# Patient Record
Sex: MALE | Race: WHITE | NOT HISPANIC OR LATINO
[De-identification: names, ages, dates, MRNs, and addresses within clinical notes are randomized per-mention and may not be internally consistent; named-entity substitution may affect disease eponyms.]

---

## 2017-03-29 ENCOUNTER — APPOINTMENT (OUTPATIENT)
Dept: NEUROLOGY | Facility: CLINIC | Age: 67
End: 2017-03-29

## 2017-03-29 VITALS
BODY MASS INDEX: 27.98 KG/M2 | SYSTOLIC BLOOD PRESSURE: 118 MMHG | WEIGHT: 225 LBS | DIASTOLIC BLOOD PRESSURE: 62 MMHG | HEART RATE: 53 BPM | HEIGHT: 75 IN

## 2017-03-30 ENCOUNTER — APPOINTMENT (OUTPATIENT)
Dept: NEUROLOGY | Facility: CLINIC | Age: 67
End: 2017-03-30

## 2017-04-07 ENCOUNTER — OUTPATIENT (OUTPATIENT)
Dept: OUTPATIENT SERVICES | Facility: HOSPITAL | Age: 67
LOS: 1 days | End: 2017-04-07
Payer: MEDICARE

## 2017-04-07 ENCOUNTER — APPOINTMENT (OUTPATIENT)
Dept: CV DIAGNOSITCS | Facility: HOSPITAL | Age: 67
End: 2017-04-07

## 2017-04-07 DIAGNOSIS — G45.1 CAROTID ARTERY SYNDROME (HEMISPHERIC): ICD-10-CM

## 2017-04-07 PROCEDURE — 93306 TTE W/DOPPLER COMPLETE: CPT

## 2017-04-07 PROCEDURE — 93306 TTE W/DOPPLER COMPLETE: CPT | Mod: 26

## 2017-04-07 PROCEDURE — 93312 ECHO TRANSESOPHAGEAL: CPT | Mod: 26

## 2017-04-07 PROCEDURE — 93312 ECHO TRANSESOPHAGEAL: CPT

## 2017-04-14 ENCOUNTER — FORM ENCOUNTER (OUTPATIENT)
Age: 67
End: 2017-04-14

## 2017-04-15 ENCOUNTER — OUTPATIENT (OUTPATIENT)
Dept: OUTPATIENT SERVICES | Facility: HOSPITAL | Age: 67
LOS: 1 days | End: 2017-04-15
Payer: MEDICARE

## 2017-04-15 ENCOUNTER — APPOINTMENT (OUTPATIENT)
Dept: MRI IMAGING | Facility: CLINIC | Age: 67
End: 2017-04-15

## 2017-04-15 DIAGNOSIS — Z00.8 ENCOUNTER FOR OTHER GENERAL EXAMINATION: ICD-10-CM

## 2017-04-15 PROCEDURE — 70549 MR ANGIOGRAPH NECK W/O&W/DYE: CPT

## 2017-04-15 PROCEDURE — A9585: CPT

## 2017-04-15 PROCEDURE — 70544 MR ANGIOGRAPHY HEAD W/O DYE: CPT

## 2017-04-15 PROCEDURE — 70553 MRI BRAIN STEM W/O & W/DYE: CPT

## 2017-04-15 PROCEDURE — 82565 ASSAY OF CREATININE: CPT

## 2017-04-18 ENCOUNTER — CHART COPY (OUTPATIENT)
Age: 67
End: 2017-04-18

## 2017-04-24 ENCOUNTER — APPOINTMENT (OUTPATIENT)
Dept: NEUROLOGY | Facility: CLINIC | Age: 67
End: 2017-04-24

## 2017-04-25 ENCOUNTER — APPOINTMENT (OUTPATIENT)
Dept: NEUROLOGY | Facility: CLINIC | Age: 67
End: 2017-04-25

## 2017-05-02 ENCOUNTER — APPOINTMENT (OUTPATIENT)
Dept: NEUROLOGY | Facility: CLINIC | Age: 67
End: 2017-05-02

## 2017-05-02 VITALS
HEIGHT: 75 IN | BODY MASS INDEX: 28.35 KG/M2 | HEART RATE: 53 BPM | DIASTOLIC BLOOD PRESSURE: 74 MMHG | SYSTOLIC BLOOD PRESSURE: 130 MMHG | WEIGHT: 228 LBS

## 2017-05-02 RX ORDER — CLOPIDOGREL 75 MG/1
TABLET, FILM COATED ORAL
Refills: 0 | Status: DISCONTINUED | COMMUNITY
End: 2017-05-02

## 2017-05-04 ENCOUNTER — FORM ENCOUNTER (OUTPATIENT)
Age: 67
End: 2017-05-04

## 2017-05-05 ENCOUNTER — APPOINTMENT (OUTPATIENT)
Dept: NEUROLOGY | Facility: CLINIC | Age: 67
End: 2017-05-05

## 2017-05-05 ENCOUNTER — APPOINTMENT (OUTPATIENT)
Dept: ULTRASOUND IMAGING | Facility: CLINIC | Age: 67
End: 2017-05-05

## 2017-05-05 ENCOUNTER — OUTPATIENT (OUTPATIENT)
Dept: OUTPATIENT SERVICES | Facility: HOSPITAL | Age: 67
LOS: 1 days | End: 2017-05-05
Payer: MEDICARE

## 2017-05-05 DIAGNOSIS — G45.9 TRANSIENT CEREBRAL ISCHEMIC ATTACK, UNSPECIFIED: ICD-10-CM

## 2017-05-05 PROCEDURE — 93970 EXTREMITY STUDY: CPT

## 2017-05-15 ENCOUNTER — OUTPATIENT (OUTPATIENT)
Dept: OUTPATIENT SERVICES | Facility: HOSPITAL | Age: 67
LOS: 1 days | End: 2017-05-15
Payer: MEDICARE

## 2017-05-15 VITALS
HEART RATE: 52 BPM | OXYGEN SATURATION: 98 % | HEIGHT: 75 IN | TEMPERATURE: 98 F | WEIGHT: 227.96 LBS | DIASTOLIC BLOOD PRESSURE: 74 MMHG | SYSTOLIC BLOOD PRESSURE: 125 MMHG | RESPIRATION RATE: 16 BRPM

## 2017-05-15 DIAGNOSIS — I66.9 OCCLUSION AND STENOSIS OF UNSPECIFIED CEREBRAL ARTERY: ICD-10-CM

## 2017-05-15 PROCEDURE — C1764: CPT

## 2017-05-15 PROCEDURE — 93005 ELECTROCARDIOGRAM TRACING: CPT

## 2017-05-15 PROCEDURE — 93010 ELECTROCARDIOGRAM REPORT: CPT

## 2017-05-15 PROCEDURE — 99152 MOD SED SAME PHYS/QHP 5/>YRS: CPT

## 2017-05-15 PROCEDURE — 33282: CPT

## 2017-05-15 NOTE — H&P CARDIOLOGY - PMH
Depressed    Gastroesophageal reflux disease    High cholesterol    Hypothyroid    TIA (transient ischemic attack)

## 2017-05-15 NOTE — H&P CARDIOLOGY - HISTORY OF PRESENT ILLNESS
65 yo PMH CAD, HLD, depression, hypothyroidism, GERD, hx of alcohol and drug abuse (cocaine last used in 1988, alcohol 1989), hx TIA presents today for loop recorder implant. Patient reports reoccurring episodes of dysarthria within the last 2 years. Last episode was on March 29, 2017, sudden acute onset of dysarthria lasting several minutes- not associated with HA, visual changes, change in LOC, migraines. Patient currently denies chest pain, palpitations edema, syncope, recent fever/chills    neurology: Dr. Zain Moreau   cards: Arya Ordoñez

## 2017-05-19 ENCOUNTER — APPOINTMENT (OUTPATIENT)
Dept: NEUROLOGY | Facility: CLINIC | Age: 67
End: 2017-05-19

## 2017-05-21 ENCOUNTER — FORM ENCOUNTER (OUTPATIENT)
Age: 67
End: 2017-05-21

## 2017-05-22 ENCOUNTER — OUTPATIENT (OUTPATIENT)
Dept: OUTPATIENT SERVICES | Facility: HOSPITAL | Age: 67
LOS: 1 days | End: 2017-05-22
Payer: MEDICARE

## 2017-05-22 ENCOUNTER — APPOINTMENT (OUTPATIENT)
Dept: MRI IMAGING | Facility: CLINIC | Age: 67
End: 2017-05-22

## 2017-05-22 DIAGNOSIS — G45.9 TRANSIENT CEREBRAL ISCHEMIC ATTACK, UNSPECIFIED: ICD-10-CM

## 2017-05-22 PROCEDURE — C8920: CPT

## 2017-05-22 PROCEDURE — A9585: CPT

## 2017-05-25 ENCOUNTER — APPOINTMENT (OUTPATIENT)
Dept: ELECTROPHYSIOLOGY | Facility: CLINIC | Age: 67
End: 2017-05-25

## 2017-05-26 ENCOUNTER — NON-APPOINTMENT (OUTPATIENT)
Age: 67
End: 2017-05-26

## 2017-05-26 ENCOUNTER — APPOINTMENT (OUTPATIENT)
Dept: ELECTROPHYSIOLOGY | Facility: CLINIC | Age: 67
End: 2017-05-26

## 2017-05-26 VITALS
DIASTOLIC BLOOD PRESSURE: 74 MMHG | HEART RATE: 60 BPM | SYSTOLIC BLOOD PRESSURE: 135 MMHG | WEIGHT: 234 LBS | BODY MASS INDEX: 29.25 KG/M2

## 2017-06-13 ENCOUNTER — EMERGENCY (EMERGENCY)
Facility: HOSPITAL | Age: 67
LOS: 0 days | Discharge: HOME | End: 2017-06-13
Attending: EMERGENCY MEDICINE
Payer: COMMERCIAL

## 2017-06-13 VITALS
OXYGEN SATURATION: 100 % | RESPIRATION RATE: 18 BRPM | WEIGHT: 138.89 LBS | TEMPERATURE: 98 F | DIASTOLIC BLOOD PRESSURE: 75 MMHG | HEART RATE: 113 BPM | SYSTOLIC BLOOD PRESSURE: 118 MMHG

## 2017-06-13 PROCEDURE — 99283 EMERGENCY DEPT VISIT LOW MDM: CPT | Mod: 25

## 2017-06-13 PROCEDURE — 12002 RPR S/N/AX/GEN/TRNK2.6-7.5CM: CPT
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
## 2017-06-14 DIAGNOSIS — S91.312A LACERATION WITHOUT FOREIGN BODY, LEFT FOOT, INITIAL ENCOUNTER: ICD-10-CM
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
## 2017-06-19 ENCOUNTER — APPOINTMENT (OUTPATIENT)
Dept: CARDIOLOGY | Facility: CLINIC | Age: 67
End: 2017-06-19

## 2017-06-21 ENCOUNTER — APPOINTMENT (OUTPATIENT)
Dept: NEUROLOGY | Facility: CLINIC | Age: 67
End: 2017-06-21

## 2017-06-21 VITALS
DIASTOLIC BLOOD PRESSURE: 73 MMHG | SYSTOLIC BLOOD PRESSURE: 127 MMHG | HEART RATE: 61 BPM | WEIGHT: 229 LBS | BODY MASS INDEX: 28.47 KG/M2 | HEIGHT: 75 IN

## 2017-06-26 ENCOUNTER — APPOINTMENT (OUTPATIENT)
Dept: ELECTROPHYSIOLOGY | Facility: CLINIC | Age: 67
End: 2017-06-26
Payer: MEDICARE

## 2017-06-26 PROCEDURE — 93298 REM INTERROG DEV EVAL SCRMS: CPT

## 2017-06-27 ENCOUNTER — APPOINTMENT (OUTPATIENT)
Dept: NEUROLOGY | Facility: CLINIC | Age: 67
End: 2017-06-27

## 2017-07-14 ENCOUNTER — APPOINTMENT (OUTPATIENT)
Dept: CARDIOLOGY | Facility: CLINIC | Age: 67
End: 2017-07-14

## 2017-07-14 ENCOUNTER — NON-APPOINTMENT (OUTPATIENT)
Age: 67
End: 2017-07-14

## 2017-07-14 VITALS
WEIGHT: 232 LBS | DIASTOLIC BLOOD PRESSURE: 79 MMHG | HEIGHT: 75 IN | SYSTOLIC BLOOD PRESSURE: 126 MMHG | BODY MASS INDEX: 28.85 KG/M2 | OXYGEN SATURATION: 98 % | HEART RATE: 56 BPM

## 2017-07-20 ENCOUNTER — APPOINTMENT (OUTPATIENT)
Dept: CV DIAGNOSTICS | Facility: HOSPITAL | Age: 67
End: 2017-07-20

## 2017-07-20 ENCOUNTER — OUTPATIENT (OUTPATIENT)
Dept: OUTPATIENT SERVICES | Facility: HOSPITAL | Age: 67
LOS: 1 days | End: 2017-07-20
Payer: MEDICARE

## 2017-07-20 DIAGNOSIS — I25.10 ATHEROSCLEROTIC HEART DISEASE OF NATIVE CORONARY ARTERY WITHOUT ANGINA PECTORIS: ICD-10-CM

## 2017-07-20 PROCEDURE — 78452 HT MUSCLE IMAGE SPECT MULT: CPT | Mod: 26

## 2017-07-20 PROCEDURE — 93017 CV STRESS TEST TRACING ONLY: CPT

## 2017-07-20 PROCEDURE — 93016 CV STRESS TEST SUPVJ ONLY: CPT

## 2017-07-20 PROCEDURE — 93018 CV STRESS TEST I&R ONLY: CPT

## 2017-07-20 PROCEDURE — 78452 HT MUSCLE IMAGE SPECT MULT: CPT

## 2017-07-20 PROCEDURE — A9500: CPT

## 2017-08-08 ENCOUNTER — APPOINTMENT (OUTPATIENT)
Dept: ELECTROPHYSIOLOGY | Facility: CLINIC | Age: 67
End: 2017-08-08
Payer: MEDICARE

## 2017-08-08 PROCEDURE — 93298 REM INTERROG DEV EVAL SCRMS: CPT

## 2017-08-16 ENCOUNTER — APPOINTMENT (OUTPATIENT)
Dept: PULMONOLOGY | Facility: CLINIC | Age: 67
End: 2017-08-16
Payer: MEDICARE

## 2017-08-16 VITALS
TEMPERATURE: 97.8 F | SYSTOLIC BLOOD PRESSURE: 120 MMHG | HEIGHT: 75 IN | DIASTOLIC BLOOD PRESSURE: 80 MMHG | HEART RATE: 52 BPM | WEIGHT: 230 LBS | RESPIRATION RATE: 14 BRPM | BODY MASS INDEX: 28.6 KG/M2

## 2017-08-16 DIAGNOSIS — R06.83 SNORING: ICD-10-CM

## 2017-08-16 PROCEDURE — 99204 OFFICE O/P NEW MOD 45 MIN: CPT

## 2017-09-12 ENCOUNTER — APPOINTMENT (OUTPATIENT)
Dept: NEUROLOGY | Facility: CLINIC | Age: 67
End: 2017-09-12

## 2017-09-13 ENCOUNTER — NON-APPOINTMENT (OUTPATIENT)
Age: 67
End: 2017-09-13

## 2017-09-13 ENCOUNTER — APPOINTMENT (OUTPATIENT)
Dept: ELECTROPHYSIOLOGY | Facility: CLINIC | Age: 67
End: 2017-09-13
Payer: MEDICARE

## 2017-09-13 VITALS — DIASTOLIC BLOOD PRESSURE: 70 MMHG | HEART RATE: 54 BPM | OXYGEN SATURATION: 99 % | SYSTOLIC BLOOD PRESSURE: 137 MMHG

## 2017-09-13 PROCEDURE — 93285 PRGRMG DEV EVAL SCRMS IP: CPT

## 2017-09-27 ENCOUNTER — APPOINTMENT (OUTPATIENT)
Dept: SLEEP CENTER | Facility: CLINIC | Age: 67
End: 2017-09-27
Payer: MEDICARE

## 2017-09-27 ENCOUNTER — OUTPATIENT (OUTPATIENT)
Dept: OUTPATIENT SERVICES | Facility: HOSPITAL | Age: 67
LOS: 1 days | End: 2017-09-27
Payer: MEDICARE

## 2017-09-27 PROCEDURE — G0399: CPT | Mod: 26

## 2017-09-27 PROCEDURE — G0399: CPT

## 2017-10-03 ENCOUNTER — APPOINTMENT (OUTPATIENT)
Dept: ELECTROPHYSIOLOGY | Facility: CLINIC | Age: 67
End: 2017-10-03

## 2017-10-06 DIAGNOSIS — G47.33 OBSTRUCTIVE SLEEP APNEA (ADULT) (PEDIATRIC): ICD-10-CM

## 2017-10-16 ENCOUNTER — APPOINTMENT (OUTPATIENT)
Dept: NEUROLOGY | Facility: CLINIC | Age: 67
End: 2017-10-16
Payer: MEDICARE

## 2017-10-16 VITALS
BODY MASS INDEX: 29.09 KG/M2 | WEIGHT: 234 LBS | HEART RATE: 50 BPM | SYSTOLIC BLOOD PRESSURE: 119 MMHG | DIASTOLIC BLOOD PRESSURE: 63 MMHG | HEIGHT: 75 IN

## 2017-10-16 PROCEDURE — 99215 OFFICE O/P EST HI 40 MIN: CPT

## 2017-10-23 ENCOUNTER — MEDICATION RENEWAL (OUTPATIENT)
Age: 67
End: 2017-10-23

## 2017-10-31 ENCOUNTER — APPOINTMENT (OUTPATIENT)
Dept: ELECTROPHYSIOLOGY | Facility: CLINIC | Age: 67
End: 2017-10-31
Payer: MEDICARE

## 2017-10-31 PROCEDURE — 93298 REM INTERROG DEV EVAL SCRMS: CPT

## 2017-11-13 ENCOUNTER — APPOINTMENT (OUTPATIENT)
Dept: ELECTROPHYSIOLOGY | Facility: CLINIC | Age: 67
End: 2017-11-13

## 2017-11-27 ENCOUNTER — OUTPATIENT (OUTPATIENT)
Dept: OUTPATIENT SERVICES | Facility: HOSPITAL | Age: 67
LOS: 1 days | End: 2017-11-27
Payer: MEDICARE

## 2017-11-27 ENCOUNTER — APPOINTMENT (OUTPATIENT)
Dept: SLEEP CENTER | Facility: CLINIC | Age: 67
End: 2017-11-27
Payer: MEDICARE

## 2017-11-27 PROCEDURE — G0400: CPT

## 2017-11-27 PROCEDURE — G0400: CPT | Mod: 26

## 2017-12-08 ENCOUNTER — RESULT REVIEW (OUTPATIENT)
Age: 67
End: 2017-12-08

## 2017-12-08 DIAGNOSIS — G47.33 OBSTRUCTIVE SLEEP APNEA (ADULT) (PEDIATRIC): ICD-10-CM

## 2017-12-18 ENCOUNTER — APPOINTMENT (OUTPATIENT)
Dept: ELECTROPHYSIOLOGY | Facility: CLINIC | Age: 67
End: 2017-12-18
Payer: MEDICARE

## 2017-12-18 PROCEDURE — 93298 REM INTERROG DEV EVAL SCRMS: CPT

## 2017-12-18 PROCEDURE — 93299: CPT

## 2018-01-08 ENCOUNTER — APPOINTMENT (OUTPATIENT)
Dept: CARDIOLOGY | Facility: CLINIC | Age: 68
End: 2018-01-08

## 2018-01-30 ENCOUNTER — APPOINTMENT (OUTPATIENT)
Dept: ELECTROPHYSIOLOGY | Facility: CLINIC | Age: 68
End: 2018-01-30
Payer: MEDICARE

## 2018-01-30 PROCEDURE — 93299: CPT

## 2018-01-30 PROCEDURE — 93298 REM INTERROG DEV EVAL SCRMS: CPT

## 2018-02-27 ENCOUNTER — EMERGENCY (EMERGENCY)
Facility: HOSPITAL | Age: 68
LOS: 1 days | Discharge: HOME | End: 2018-02-27
Attending: EMERGENCY MEDICINE | Admitting: EMERGENCY MEDICINE
Payer: MEDICARE

## 2018-02-27 ENCOUNTER — APPOINTMENT (OUTPATIENT)
Dept: NEUROLOGY | Facility: CLINIC | Age: 68
End: 2018-02-27
Payer: MEDICARE

## 2018-02-27 VITALS
HEART RATE: 52 BPM | RESPIRATION RATE: 16 BRPM | OXYGEN SATURATION: 98 % | DIASTOLIC BLOOD PRESSURE: 75 MMHG | SYSTOLIC BLOOD PRESSURE: 143 MMHG

## 2018-02-27 VITALS
BODY MASS INDEX: 27.98 KG/M2 | HEART RATE: 54 BPM | SYSTOLIC BLOOD PRESSURE: 118 MMHG | DIASTOLIC BLOOD PRESSURE: 59 MMHG | WEIGHT: 225 LBS | HEIGHT: 75 IN

## 2018-02-27 LAB
ALBUMIN SERPL ELPH-MCNC: 4 G/DL — SIGNIFICANT CHANGE UP (ref 3.3–5)
ALP SERPL-CCNC: 61 U/L — SIGNIFICANT CHANGE UP (ref 40–120)
ALT FLD-CCNC: 10 U/L RC — SIGNIFICANT CHANGE UP (ref 10–45)
ANION GAP SERPL CALC-SCNC: 11 MMOL/L — SIGNIFICANT CHANGE UP (ref 5–17)
APTT BLD: 29.6 SEC — SIGNIFICANT CHANGE UP (ref 27.5–37.4)
AST SERPL-CCNC: 17 U/L — SIGNIFICANT CHANGE UP (ref 10–40)
BASOPHILS # BLD AUTO: 0.1 K/UL — SIGNIFICANT CHANGE UP (ref 0–0.2)
BASOPHILS NFR BLD AUTO: 1.5 % — SIGNIFICANT CHANGE UP (ref 0–2)
BILIRUB SERPL-MCNC: 0.3 MG/DL — SIGNIFICANT CHANGE UP (ref 0.2–1.2)
BUN SERPL-MCNC: 11 MG/DL — SIGNIFICANT CHANGE UP (ref 7–23)
CALCIUM SERPL-MCNC: 9.4 MG/DL — SIGNIFICANT CHANGE UP (ref 8.4–10.5)
CHLORIDE SERPL-SCNC: 106 MMOL/L — SIGNIFICANT CHANGE UP (ref 96–108)
CO2 SERPL-SCNC: 27 MMOL/L — SIGNIFICANT CHANGE UP (ref 22–31)
CREAT SERPL-MCNC: 0.87 MG/DL — SIGNIFICANT CHANGE UP (ref 0.5–1.3)
EOSINOPHIL # BLD AUTO: 0.1 K/UL — SIGNIFICANT CHANGE UP (ref 0–0.5)
EOSINOPHIL NFR BLD AUTO: 2.4 % — SIGNIFICANT CHANGE UP (ref 0–6)
GAS PNL BLDV: SIGNIFICANT CHANGE UP
GLUCOSE SERPL-MCNC: 72 MG/DL — SIGNIFICANT CHANGE UP (ref 70–99)
HCT VFR BLD CALC: 36.8 % — LOW (ref 39–50)
HGB BLD-MCNC: 12.8 G/DL — LOW (ref 13–17)
INR BLD: 1.07 RATIO — SIGNIFICANT CHANGE UP (ref 0.88–1.16)
LYMPHOCYTES # BLD AUTO: 1.4 K/UL — SIGNIFICANT CHANGE UP (ref 1–3.3)
LYMPHOCYTES # BLD AUTO: 29.9 % — SIGNIFICANT CHANGE UP (ref 13–44)
MCHC RBC-ENTMCNC: 32.4 PG — SIGNIFICANT CHANGE UP (ref 27–34)
MCHC RBC-ENTMCNC: 34.7 GM/DL — SIGNIFICANT CHANGE UP (ref 32–36)
MCV RBC AUTO: 93.4 FL — SIGNIFICANT CHANGE UP (ref 80–100)
MONOCYTES # BLD AUTO: 0.5 K/UL — SIGNIFICANT CHANGE UP (ref 0–0.9)
MONOCYTES NFR BLD AUTO: 9.7 % — SIGNIFICANT CHANGE UP (ref 2–14)
NEUTROPHILS # BLD AUTO: 2.7 K/UL — SIGNIFICANT CHANGE UP (ref 1.8–7.4)
NEUTROPHILS NFR BLD AUTO: 56.5 % — SIGNIFICANT CHANGE UP (ref 43–77)
PLATELET # BLD AUTO: 160 K/UL — SIGNIFICANT CHANGE UP (ref 150–400)
POTASSIUM SERPL-MCNC: 4.8 MMOL/L — SIGNIFICANT CHANGE UP (ref 3.5–5.3)
POTASSIUM SERPL-SCNC: 4.8 MMOL/L — SIGNIFICANT CHANGE UP (ref 3.5–5.3)
PROT SERPL-MCNC: 7.6 G/DL — SIGNIFICANT CHANGE UP (ref 6–8.3)
PROTHROM AB SERPL-ACNC: 11.6 SEC — SIGNIFICANT CHANGE UP (ref 9.8–12.7)
RBC # BLD: 3.94 M/UL — LOW (ref 4.2–5.8)
RBC # FLD: 12.2 % — SIGNIFICANT CHANGE UP (ref 10.3–14.5)
SODIUM SERPL-SCNC: 144 MMOL/L — SIGNIFICANT CHANGE UP (ref 135–145)
WBC # BLD: 4.8 K/UL — SIGNIFICANT CHANGE UP (ref 3.8–10.5)
WBC # FLD AUTO: 4.8 K/UL — SIGNIFICANT CHANGE UP (ref 3.8–10.5)

## 2018-02-27 PROCEDURE — 93010 ELECTROCARDIOGRAM REPORT: CPT

## 2018-02-27 PROCEDURE — 99215 OFFICE O/P EST HI 40 MIN: CPT

## 2018-02-27 PROCEDURE — 99220: CPT | Mod: 25

## 2018-02-27 PROCEDURE — 70498 CT ANGIOGRAPHY NECK: CPT | Mod: 26

## 2018-02-27 PROCEDURE — 70496 CT ANGIOGRAPHY HEAD: CPT | Mod: 26

## 2018-02-27 RX ORDER — LEVOTHYROXINE SODIUM 125 MCG
175 TABLET ORAL DAILY
Qty: 0 | Refills: 0 | Status: DISCONTINUED | OUTPATIENT
Start: 2018-02-27 | End: 2018-03-03

## 2018-02-27 RX ORDER — ATORVASTATIN CALCIUM 80 MG/1
10 TABLET, FILM COATED ORAL AT BEDTIME
Qty: 0 | Refills: 0 | Status: DISCONTINUED | OUTPATIENT
Start: 2018-02-27 | End: 2018-03-03

## 2018-02-27 RX ORDER — PANTOPRAZOLE SODIUM 20 MG/1
40 TABLET, DELAYED RELEASE ORAL
Qty: 0 | Refills: 0 | Status: DISCONTINUED | OUTPATIENT
Start: 2018-02-27 | End: 2018-03-03

## 2018-02-27 RX ORDER — SODIUM CHLORIDE 9 MG/ML
1000 INJECTION INTRAMUSCULAR; INTRAVENOUS; SUBCUTANEOUS ONCE
Qty: 0 | Refills: 0 | Status: COMPLETED | OUTPATIENT
Start: 2018-02-27 | End: 2018-02-27

## 2018-02-27 RX ORDER — LEVOTHYROXINE SODIUM 125 MCG
1 TABLET ORAL
Qty: 0 | Refills: 0 | COMMUNITY

## 2018-02-27 RX ORDER — ASPIRIN AND DIPYRIDAMOLE 25; 200 MG/1; MG/1
1 CAPSULE, EXTENDED RELEASE ORAL
Qty: 0 | Refills: 0 | Status: DISCONTINUED | OUTPATIENT
Start: 2018-02-27 | End: 2018-03-03

## 2018-02-27 RX ORDER — SODIUM CHLORIDE 9 MG/ML
3 INJECTION INTRAMUSCULAR; INTRAVENOUS; SUBCUTANEOUS EVERY 12 HOURS
Qty: 0 | Refills: 0 | Status: DISCONTINUED | OUTPATIENT
Start: 2018-02-27 | End: 2018-03-03

## 2018-02-27 RX ADMIN — ATORVASTATIN CALCIUM 10 MILLIGRAM(S): 80 TABLET, FILM COATED ORAL at 23:23

## 2018-02-27 RX ADMIN — SODIUM CHLORIDE 1000 MILLILITER(S): 9 INJECTION INTRAMUSCULAR; INTRAVENOUS; SUBCUTANEOUS at 19:26

## 2018-02-27 RX ADMIN — ASPIRIN AND DIPYRIDAMOLE 1 CAPSULE(S): 25; 200 CAPSULE, EXTENDED RELEASE ORAL at 23:23

## 2018-02-27 NOTE — ED CDU PROVIDER INITIAL DAY NOTE - CHPI ED SYMPTOMS NEG
no vomiting/no numbness/no dizziness/no blurred vision/no confusion/no loss of consciousness/no nausea/no fever/no weakness/no change in level of consciousness

## 2018-02-27 NOTE — CONSULT NOTE ADULT - SUBJECTIVE AND OBJECTIVE BOX
Neurology consult    SMITH [id number]     Patient is a 67y old  Male who presents with a chief complaint of difficulty with word finding    HPI: Patient is a 68 y/o man with a pmh of hypothyroid, TIA and CAD who presents with a 1.5 minute period of difficulty finding words. Today, patient had an appointment with his outpatient neurologist Dr. Moreau. During the naming portion of the neuro exam he was able to recognize and say watch, but when presented with a pen he knew what it was, but was unable to find the word pen. He had no difficulty reading during this episode and he was able to repeat a short phrase without difficulty. Dr. Moreau left to call EMS and by the time that he returned he was able to say pen and had a completely normal neuro exam per patient.   The previous Thursday he had an EGD. To prepare for the procedure he had stopped taking his aspirin/dipyridemole 1 week prior. He restarted on Sunday 2/25. This is the 5th occurrence of an episode like this. The first was >5 years ago. He drove himself to the ED and was told that CT was negative.     REVIEW OF SYSTEMS:    Constitutional: No fever, chills, fatigue, weakness  Eyes: no eye pain, visual disturbances, or discharge  ENT:  No difficulty hearing, tinnitus, vertigo; No sinus or throat pain  Neck: No pain or stiffness  Respiratory: No cough, dyspnea, wheezing   Cardiovascular: No chest pain, palpitations,   Gastrointestinal: No abdominal or epigastric pain. No nausea, vomiting  No diarrhea or constipation.   Genitourinary: No dysuria, frequency, hematuria or incontinence  Neurological: No headaches, lightheadedness, vertigo, numbness or tremors  Psychiatric: No depression, anxiety, mood swings or difficulty sleeping  Musculoskeletal: No joint pain or swelling; No muscle, back or extremity pain  Skin: No itching, burning, rashes or lesions   Lymph Nodes: No enlarged glands  Endocrine: No heat or cold intolerance; No hair loss, No h/o diabetes or thyroid dysfunction  Allergy and Immunologic: No hives or eczema    MEDICATIONS        PMH: TIA (transient ischemic attack)  Gastroesophageal reflux disease  Depressed  Hypothyroid  High cholesterol       PSH: No significant past surgical history      Family history: No history of dementia, strokes, or seizures   FAMILY HISTORY:  Family history of myocardial infarction (Father, Sibling)      SOCIAL HISTORY:  No history of tobacco or alcohol use     Allergies    No Known Allergies    Intolerances            Vital Signs Last 24 Hrs  T(C): 36.7 (27 Feb 2018 15:46), Max: 36.7 (27 Feb 2018 15:46)  T(F): 98 (27 Feb 2018 15:46), Max: 98 (27 Feb 2018 15:46)  HR: 56 (27 Feb 2018 15:46) (52 - 56)  BP: 158/85 (27 Feb 2018 15:46) (143/75 - 158/85)  BP(mean): --  RR: 18 (27 Feb 2018 15:46) (16 - 18)  SpO2: 100% (27 Feb 2018 15:46) (98% - 100%)      On Neurological Examination:    Mental Status - Patient is alert, awake, oriented X3. fluent, names, no dysarthria no aphasia Follows commands well and able to answer questions appropriately. Mood and affect  normal    Cranial Nerves - PERRL, EOMI, VFF, V1-V3 intact, no gross facial asymmetry, tongue/uvula midline    Motor Exam -   Right upper  Left upper  Right lower  Left lower      nml bulk/tone    Sensory    Intact to light touch and pinprick bilaterally    Coord: FTN intact bilaterally     Gait -  normal      Reflexes:          2+ throughout                                               GENERAL Exam:     Nontoxic , No Acute Distress   	  HEENT:  normocephalic, atraumatic  		  LUNGS:	Clear bilaterally  No Wheeze    	  HEART:	 Normal S1S2   No murmur RRR        	  GI/ ABDOMEN:  Soft  Non tender    EXTREMITIES:   No Edema  No Clubbing  No Cyanosis No Edema    MUSCULOSKELETAL: Normal Range of Motion  	   SKIN:      Normal   No Ecchymosis               LABS:  CBC Full  -  ( 27 Feb 2018 15:59 )  WBC Count : 4.8 K/uL  Hemoglobin : 12.8 g/dL  Hematocrit : 36.8 %  Platelet Count - Automated : 160 K/uL  Mean Cell Volume : 93.4 fl  Mean Cell Hemoglobin : 32.4 pg  Mean Cell Hemoglobin Concentration : 34.7 gm/dL  Auto Neutrophil # : 2.7 K/uL  Auto Lymphocyte # : 1.4 K/uL  Auto Monocyte # : 0.5 K/uL  Auto Eosinophil # : 0.1 K/uL  Auto Basophil # : 0.1 K/uL  Auto Neutrophil % : 56.5 %  Auto Lymphocyte % : 29.9 %  Auto Monocyte % : 9.7 %  Auto Eosinophil % : 2.4 %  Auto Basophil % : 1.5 %      02-27    144  |  106  |  11  ----------------------------<  72  4.8   |  27  |  0.87    Ca    9.4      27 Feb 2018 15:59    TPro  7.6  /  Alb  4.0  /  TBili  0.3  /  DBili  x   /  AST  17  /  ALT  10  /  AlkPhos  61  02-27    LIVER FUNCTIONS - ( 27 Feb 2018 15:59 )  Alb: 4.0 g/dL / Pro: 7.6 g/dL / ALK PHOS: 61 U/L / ALT: 10 U/L RC / AST: 17 U/L / GGT: x           Hemoglobin A1C:       PT/INR - ( 27 Feb 2018 15:59 )   PT: 11.6 sec;   INR: 1.07 ratio         PTT - ( 27 Feb 2018 15:59 )  PTT:29.6 sec      RADIOLOGY  CTH   MRI: Neurology consult    SMITH [id number]     Patient is a 67y old  Male who presents with a chief complaint of difficulty with word finding    HPI: Patient is a 66 y/o man with a pmh of hypothyroid, TIA and CAD who presents with a 1.5 minute period of difficulty finding words. Today, patient had an appointment with his outpatient neurologist Dr. Moreau. During the naming portion of the neuro exam he was able to recognize and say watch, but when presented with a pen he knew what it was, but was unable to find the word pen. He had no difficulty reading during this episode and he was able to repeat a short phrase without difficulty. Dr. Moreau left to call EMS and by the time that he returned he was able to say pen and had a completely normal neuro exam per patient.   The previous Thursday he had an EGD. To prepare for the procedure he had stopped taking his aspirin/dipyridemole 1 week prior. He restarted on Sunday 2/25. This is the 5th occurrence of an episode like this. The first was >5 years ago. He drove himself to the ED and was told that CT was negative.     REVIEW OF SYSTEMS:    Constitutional: No fever, chills, fatigue, weakness  Eyes: no eye pain, visual disturbances, or discharge  ENT:  No difficulty hearing, tinnitus, vertigo; No sinus or throat pain  Neck: No pain or stiffness  Respiratory: No cough, dyspnea, wheezing   Cardiovascular: No chest pain, palpitations,   Gastrointestinal: No abdominal or epigastric pain. No nausea, vomiting  No diarrhea or constipation.   Genitourinary: No dysuria, frequency, hematuria or incontinence  Neurological: No headaches, lightheadedness, vertigo, numbness or tremors  Psychiatric: No depression, anxiety, mood swings or difficulty sleeping  Musculoskeletal: No joint pain or swelling; No muscle, back or extremity pain  Skin: No itching, burning, rashes or lesions   Lymph Nodes: No enlarged glands  Endocrine: No heat or cold intolerance; No hair loss, No h/o diabetes or thyroid dysfunction  Allergy and Immunologic: No hives or eczema    MEDICATIONS    Aggrenox 25/200  Paroxetine 30  Protonix 40  Pravastatin 20  Thyroxine 175    PMH: TIA (transient ischemic attack)  Gastroesophageal reflux disease  Depressed  Hypothyroid  High cholesterol       PSH: No significant past surgical history       FAMILY HISTORY:  Family history of myocardial infarction (Father, Sibling)      SOCIAL HISTORY:  No history of tobacco or alcohol use     Allergies    No Known Allergies    Intolerances            Vital Signs Last 24 Hrs  T(C): 36.7 (27 Feb 2018 15:46), Max: 36.7 (27 Feb 2018 15:46)  T(F): 98 (27 Feb 2018 15:46), Max: 98 (27 Feb 2018 15:46)  HR: 56 (27 Feb 2018 15:46) (52 - 56)  BP: 158/85 (27 Feb 2018 15:46) (143/75 - 158/85)  BP(mean): --  RR: 18 (27 Feb 2018 15:46) (16 - 18)  SpO2: 100% (27 Feb 2018 15:46) (98% - 100%)      On Neurological Examination:    Mental Status - Patient is alert, awake, oriented X3. fluent, names, no dysarthria no aphasia Follows commands well and able to answer questions appropriately. Mood and affect  normal    Cranial Nerves - PERRL, EOMI, VFF, V1-V3 intact, no gross facial asymmetry, tongue/uvula midline    Motor Exam -   Right upper  Left upper  Right lower  Left lower      nml bulk/tone    Sensory    Intact to light touch and pinprick bilaterally    Coord: FTN intact bilaterally     Gait -  normal      Reflexes:          2+ throughout                                               GENERAL Exam:     Nontoxic , No Acute Distress   	  HEENT:  normocephalic, atraumatic  		  LUNGS:	Clear bilaterally  No Wheeze    	  HEART:	 Normal S1S2   No murmur RRR        	  GI/ ABDOMEN:  Soft  Non tender    EXTREMITIES:   No Edema  No Clubbing  No Cyanosis No Edema    MUSCULOSKELETAL: Normal Range of Motion  	   SKIN:      Normal   No Ecchymosis               LABS:  CBC Full  -  ( 27 Feb 2018 15:59 )  WBC Count : 4.8 K/uL  Hemoglobin : 12.8 g/dL  Hematocrit : 36.8 %  Platelet Count - Automated : 160 K/uL  Mean Cell Volume : 93.4 fl  Mean Cell Hemoglobin : 32.4 pg  Mean Cell Hemoglobin Concentration : 34.7 gm/dL  Auto Neutrophil # : 2.7 K/uL  Auto Lymphocyte # : 1.4 K/uL  Auto Monocyte # : 0.5 K/uL  Auto Eosinophil # : 0.1 K/uL  Auto Basophil # : 0.1 K/uL  Auto Neutrophil % : 56.5 %  Auto Lymphocyte % : 29.9 %  Auto Monocyte % : 9.7 %  Auto Eosinophil % : 2.4 %  Auto Basophil % : 1.5 %      02-27    144  |  106  |  11  ----------------------------<  72  4.8   |  27  |  0.87    Ca    9.4      27 Feb 2018 15:59    TPro  7.6  /  Alb  4.0  /  TBili  0.3  /  DBili  x   /  AST  17  /  ALT  10  /  AlkPhos  61  02-27    LIVER FUNCTIONS - ( 27 Feb 2018 15:59 )  Alb: 4.0 g/dL / Pro: 7.6 g/dL / ALK PHOS: 61 U/L / ALT: 10 U/L RC / AST: 17 U/L / GGT: x           Hemoglobin A1C:       PT/INR - ( 27 Feb 2018 15:59 )   PT: 11.6 sec;   INR: 1.07 ratio         PTT - ( 27 Feb 2018 15:59 )  PTT:29.6 sec      RADIOLOGY  CTH   MRI: Neurology consult    SMITH [id number]     Patient is a 67y old  Male who presents with a chief complaint of difficulty with word finding    HPI: Patient is a 66 y/o RH man with a pmh of hypothyroid, multiple TIAs on Aggrenox 25/200 BID and CAD who presents with a 1.5 minute period of word finding difficulty. Today, patient had an appointment with his outpatient neurologist Dr. Moreau. During the naming portion of the neuro exam he was able to recognize and say watch, but when presented with a pen he knew what it was, but was unable to find the word pen. He had no difficulty reading during this episode and he was able to repeat a short phrase without difficulty. Dr. Moreau left to call EMS and by the time that he returned he was able to say pen and had a completely normal neuro exam per patient.   The previous Thursday he had an EGD. To prepare for the procedure he had stopped taking his aspirin/dipyridemole 1 week prior. He restarted on Sunday 2/25. This is the 5th occurrence of an episode like this. The first was >5 years ago. He drove himself to the ED and was told that CT was negative.     Dr. Moreau office note as of yet : "68 Y/O R handed man with vascular risk factors of age, ? HTN, CAD and HPLD is seen in the vascular neurology office for the evaluation and management of an episode concerning for TIA.     He reports that he has had 2 episodes in the past concerning for TIAs. Few years (1.5 years) ago, he was talking on the phone and suddenly developed language disturbance described as a combination of difficulty getting the words out of his mouth (though knew what he wanted to say) and non-sensical speech. His symptoms lasted for few seconds to a minute. He presented to the hospital but was not sure about the exact diagnosis at that time. In 5/2016, he had similar episode in the form of trouble getting the words out of the mouth and trouble expressing himself lasting for a few minutes with spontaneous resolution of his neurological deficits. He was evaluated by Dr. Jacobs (neurologist) at that time and was diagnosed to have TIA. Of note, he denies any history of migraines headaches or any visual aura in the past. He was taking aspirin before the first episode of TIA due to his history of CAD. His aspirin was switched to clopidogrel in 6/2016 after his second episode. He reports compliance with his medications and denies any significant side effects. He denies any similar episodes since 5/2016. He also denies any palpitation in the past. He denies any focal weakness, focal sensory changes, vision or language disturbance except described before, dysphagia or dysarthria in the past. He reports to have some short term memory disturbance without affecting his ability to perform ADLs. He also reported to have some trouble with the sense of directions. He is also reported to have some trouble with language in the form of trouble getting the words out of his mouth since last 2-3 months, which are reported to be getting worse lately. He is not reported to have any change in his behavior or personality lately. Of note, he has tried Lipitor and Crestor, which he was not able to tolerate due to side effects. On 3/29/17, he was driving and had acute onset of language disturbance lasting for about a minute and was described as not being able to get the words out of his mouth. He denies any headaches or any migrainous features at that time.     Since his last office visit, he denies any other episodes of focal neurological symptoms concerning for stroke or TIA. He reports compliance with his medications and denies any significant side effects. He is being followed by Dr. Ordoñez for cardiac monitoring.     Since his last office visit, he has had upper GI endoscopy on 2/21 and he was off of the antiplatelets since 2/19 and resumed it on 2/24. He denies any episodes of focal neurological symptoms concerning for stroke or TIA since his last office visit. "  REVIEW OF SYSTEMS:    Constitutional: No fever, chills, fatigue, weakness  Eyes: no eye pain, visual disturbances, or discharge  ENT:  No difficulty hearing, tinnitus, vertigo; No sinus or throat pain  Neck: No pain or stiffness  Respiratory: No cough, dyspnea, wheezing   Cardiovascular: No chest pain, palpitations,   Gastrointestinal: No abdominal or epigastric pain. No nausea, vomiting  No diarrhea or constipation.   Genitourinary: No dysuria, frequency, hematuria or incontinence  Neurological: No headaches, lightheadedness, vertigo, numbness or tremors  Psychiatric: No depression, anxiety, mood swings or difficulty sleeping  Musculoskeletal: No joint pain or swelling; No muscle, back or extremity pain  Skin: No itching, burning, rashes or lesions   Lymph Nodes: No enlarged glands  Endocrine: No heat or cold intolerance; No hair loss, No h/o diabetes or thyroid dysfunction  Allergy and Immunologic: No hives or eczema    MEDICATIONS    Aggrenox 25/200  Paroxetine 30  Protonix 40  Pravastatin 20  Thyroxine 175    PMH: TIA (transient ischemic attack)  Gastroesophageal reflux disease  Depressed  Hypothyroid  High cholesterol       PSH: No significant past surgical history       FAMILY HISTORY:  Family history of myocardial infarction (Father, Sibling)      SOCIAL HISTORY:  No history of tobacco or alcohol use     Allergies    No Known Allergies    Intolerances            Vital Signs Last 24 Hrs  T(C): 36.7 (27 Feb 2018 15:46), Max: 36.7 (27 Feb 2018 15:46)  T(F): 98 (27 Feb 2018 15:46), Max: 98 (27 Feb 2018 15:46)  HR: 56 (27 Feb 2018 15:46) (52 - 56)  BP: 158/85 (27 Feb 2018 15:46) (143/75 - 158/85)  BP(mean): --  RR: 18 (27 Feb 2018 15:46) (16 - 18)  SpO2: 100% (27 Feb 2018 15:46) (98% - 100%)      On Neurological Examination:    Mental Status - Patient is alert, awake, oriented X3. fluent, names, no dysarthria no aphasia Follows commands well and able to answer questions appropriately. Mood and affect  normal    Cranial Nerves - PERRL, EOMI, VFF, V1-V3 intact, no gross facial asymmetry, tongue/uvula midline    Motor Exam -   5/5 throughout     nml bulk/tone    Sensory    Intact to light touch and pinprick bilaterally    Coord: FTN intact bilaterally     Gait -  normal      Reflexes:          2+ throughout                                               GENERAL Exam:     Nontoxic , No Acute Distress   	  HEENT:  normocephalic, atraumatic  		  LUNGS:	Clear bilaterally  No Wheeze    	  HEART:	 Normal S1S2   No murmur RRR        	  GI/ ABDOMEN:  Soft  Non tender    EXTREMITIES:   No Edema  No Clubbing  No Cyanosis No Edema    MUSCULOSKELETAL: Normal Range of Motion  	   SKIN:      Normal   No Ecchymosis               LABS:  CBC Full  -  ( 27 Feb 2018 15:59 )  WBC Count : 4.8 K/uL  Hemoglobin : 12.8 g/dL  Hematocrit : 36.8 %  Platelet Count - Automated : 160 K/uL  Mean Cell Volume : 93.4 fl  Mean Cell Hemoglobin : 32.4 pg  Mean Cell Hemoglobin Concentration : 34.7 gm/dL  Auto Neutrophil # : 2.7 K/uL  Auto Lymphocyte # : 1.4 K/uL  Auto Monocyte # : 0.5 K/uL  Auto Eosinophil # : 0.1 K/uL  Auto Basophil # : 0.1 K/uL  Auto Neutrophil % : 56.5 %  Auto Lymphocyte % : 29.9 %  Auto Monocyte % : 9.7 %  Auto Eosinophil % : 2.4 %  Auto Basophil % : 1.5 %      02-27    144  |  106  |  11  ----------------------------<  72  4.8   |  27  |  0.87    Ca    9.4      27 Feb 2018 15:59    TPro  7.6  /  Alb  4.0  /  TBili  0.3  /  DBili  x   /  AST  17  /  ALT  10  /  AlkPhos  61  02-27    LIVER FUNCTIONS - ( 27 Feb 2018 15:59 )  Alb: 4.0 g/dL / Pro: 7.6 g/dL / ALK PHOS: 61 U/L / ALT: 10 U/L RC / AST: 17 U/L / GGT: x           Hemoglobin A1C:       PT/INR - ( 27 Feb 2018 15:59 )   PT: 11.6 sec;   INR: 1.07 ratio         PTT - ( 27 Feb 2018 15:59 )  PTT:29.6 sec      RADIOLOGY  CTH   MRI:

## 2018-02-27 NOTE — ED PROVIDER NOTE - MEDICAL DECISION MAKING DETAILS
67M sent from neurologist for TIA like symptoms.  Resolved in 1 min.  Will obtain labs, ekc, CT head and CTA head/neck, contact neuro and reassess.  - Maribel Pearl DO

## 2018-02-27 NOTE — ED ADULT NURSE NOTE - OBJECTIVE STATEMENT
67 yr old male to ed via ems s/p from neurologist's office with expressive aphasia last few minutes Pt awake alert and orientedx3 Resp even and nonlab Denies chest pain Denies sob Moving all four extremities denies numbness or weakness No Expressive aphasia or slurred speech No facial droop

## 2018-02-27 NOTE — ED PROVIDER NOTE - OBJECTIVE STATEMENT
67M pmh TIA, hypothyroid, HLD, depression p/w word finding difficulties from neurologist office.  No numbness, weakness.    Neuro: Dr. Zain Pearl, DO 67M pmh TIA, hypothyroid, HLD, depression p/w word finding difficulties from neurologist office.  Pt had brief episode of word finding difficulties in front of the neurologist that resolved after 1-2 mins.  No numbness, weakness associated with episode.  Denies chest pain, shortness of breath, fever/chills, nausea/vomiting/diarrhea.    Neuro: Dr. Zain Pearl, DO

## 2018-02-27 NOTE — CONSULT NOTE ADULT - ATTENDING COMMENTS
Although the patient has symptoms of transient aphasia, it does not appear cerebrovascular in origin. I reviewed the MRI findings with the patient; no evidence of acute infarct, there is silent infarcts in bilateral cerebral hemispheres and small vessel disease.. There is no evidence of large vessel stenosis or occlusion on vascular imaging. he will follow up with Dr. Moreau as outpatient.

## 2018-02-27 NOTE — ED PROVIDER NOTE - ATTENDING CONTRIBUTION TO CARE
Very well appearing and comfortable. Completely neurologically intact. ED evaluation unremarkable. Recommendation for MRI. Patient suitable for placement in CDU.

## 2018-02-27 NOTE — CONSULT NOTE ADULT - ASSESSMENT
66 y/o RH man with a pmh of hypothyroid, multiple TIAs on Aggrenox 25/200 BID and CAD who presents with a 1.5 minute period of word finding difficulty. at Neurologist office, now back at baseline. CTH pending. NIHSs 0      Per Discussion with Dr. Moreau, recommend CTH, CTA H/N  Per Dr. Moreau if CDU  bed available would prefer MRI w/o con here, if not can be done as an outpt

## 2018-02-27 NOTE — ED CDU PROVIDER INITIAL DAY NOTE - DETAILS
frequent reeval, vitals q 4hrs, tele, neurochecks q 4hrs, MRI brain, neuro following, loop recorder to be checked

## 2018-02-27 NOTE — ED CDU PROVIDER INITIAL DAY NOTE - OBJECTIVE STATEMENT
67M pmh TIA, hypothyroid, HLD, depression p/w word finding difficulties from neurologist office.  Pt had brief episode of word finding difficulties in front of the neurologist that resolved after 1-2 mins.  pt was having routine neuro exam when he was asked to identify a pen and though he reports he knew to say pen but couldn't get the word out and that lasted about 1-2 mins. pt reports this has happened before with his TIAs, about the 5th episode and the last episode was about 11 months ago. pt now feels fine without symptoms.  No numbness, weakness associated with episode.  Denies chest pain, shortness of breath, fever/chills, nausea/vomiting/diarrhea.    Neuro: Dr. Zain Moreau

## 2018-02-27 NOTE — ED CDU PROVIDER INITIAL DAY NOTE - ATTENDING CONTRIBUTION TO CARE
Patient placed in the CDU for more continuous monitoring, neurochecks and addition diagnostic investigation via MRI.

## 2018-02-28 VITALS
TEMPERATURE: 99 F | DIASTOLIC BLOOD PRESSURE: 66 MMHG | SYSTOLIC BLOOD PRESSURE: 116 MMHG | RESPIRATION RATE: 16 BRPM | OXYGEN SATURATION: 99 % | HEART RATE: 55 BPM

## 2018-02-28 LAB
CHOLEST SERPL-MCNC: 131 MG/DL — SIGNIFICANT CHANGE UP (ref 10–199)
HBA1C BLD-MCNC: 5.4 % — SIGNIFICANT CHANGE UP (ref 4–5.6)
HDLC SERPL-MCNC: 34 MG/DL — LOW (ref 40–125)
LIPID PNL WITH DIRECT LDL SERPL: 80 MG/DL — SIGNIFICANT CHANGE UP
TOTAL CHOLESTEROL/HDL RATIO MEASUREMENT: 3.9 RATIO — SIGNIFICANT CHANGE UP (ref 3.4–9.6)
TRIGL SERPL-MCNC: 83 MG/DL — SIGNIFICANT CHANGE UP (ref 10–149)

## 2018-02-28 PROCEDURE — 93005 ELECTROCARDIOGRAM TRACING: CPT

## 2018-02-28 PROCEDURE — 80061 LIPID PANEL: CPT

## 2018-02-28 PROCEDURE — 85014 HEMATOCRIT: CPT

## 2018-02-28 PROCEDURE — 85610 PROTHROMBIN TIME: CPT

## 2018-02-28 PROCEDURE — 82435 ASSAY OF BLOOD CHLORIDE: CPT

## 2018-02-28 PROCEDURE — 83605 ASSAY OF LACTIC ACID: CPT

## 2018-02-28 PROCEDURE — 85730 THROMBOPLASTIN TIME PARTIAL: CPT

## 2018-02-28 PROCEDURE — 82947 ASSAY GLUCOSE BLOOD QUANT: CPT

## 2018-02-28 PROCEDURE — 99284 EMERGENCY DEPT VISIT MOD MDM: CPT | Mod: 25

## 2018-02-28 PROCEDURE — 70551 MRI BRAIN STEM W/O DYE: CPT | Mod: 26

## 2018-02-28 PROCEDURE — 83036 HEMOGLOBIN GLYCOSYLATED A1C: CPT

## 2018-02-28 PROCEDURE — 82803 BLOOD GASES ANY COMBINATION: CPT

## 2018-02-28 PROCEDURE — 82330 ASSAY OF CALCIUM: CPT

## 2018-02-28 PROCEDURE — 99217: CPT | Mod: 25

## 2018-02-28 PROCEDURE — 70450 CT HEAD/BRAIN W/O DYE: CPT

## 2018-02-28 PROCEDURE — 84132 ASSAY OF SERUM POTASSIUM: CPT

## 2018-02-28 PROCEDURE — 70496 CT ANGIOGRAPHY HEAD: CPT

## 2018-02-28 PROCEDURE — 80053 COMPREHEN METABOLIC PANEL: CPT

## 2018-02-28 PROCEDURE — 99285 EMERGENCY DEPT VISIT HI MDM: CPT

## 2018-02-28 PROCEDURE — 84295 ASSAY OF SERUM SODIUM: CPT

## 2018-02-28 PROCEDURE — 70498 CT ANGIOGRAPHY NECK: CPT

## 2018-02-28 PROCEDURE — 70551 MRI BRAIN STEM W/O DYE: CPT

## 2018-02-28 PROCEDURE — G0378: CPT

## 2018-02-28 PROCEDURE — 85027 COMPLETE CBC AUTOMATED: CPT

## 2018-02-28 RX ADMIN — Medication 175 MICROGRAM(S): at 06:06

## 2018-02-28 RX ADMIN — Medication 30 MILLIGRAM(S): at 06:06

## 2018-02-28 RX ADMIN — SODIUM CHLORIDE 3 MILLILITER(S): 9 INJECTION INTRAMUSCULAR; INTRAVENOUS; SUBCUTANEOUS at 06:07

## 2018-02-28 NOTE — ED ADULT NURSE REASSESSMENT NOTE - NS ED NURSE REASSESS COMMENT FT1
12.00 Pt was reviewed by Dr Sheldon Paulino CDU MD & Neuro team  with all the results . Pt is Discharged Pt is A&OX 4 Neuro vitals within the Limits has steady gait  Carolyne N/V/D fever chills CP sob . Pt is discharges   12.20 YASEMIN Leblanc Explained the pt follow up care  & Pt verbalized the understanding on the same. Pt stable to go home  ML out
Patient awake and alert x 4 with no complaints of pain or discomfort.  Patient has clear speech and is moving all 4 extremities with good  strength.  Safety ensured and plan of care explained.
Pt received from OMER Gentile. Pt oriented to CDU & plan of care was discussed. Pt AO4. Neuro check intact. No deficits noted. No expressive aphasia noted. Pt states he's had multiple episodes of expressive aphasia in the past. Safety & comfort measures maintained. Call bell in reach. Will continue to monitor.
Pt AO4. Neuro check intact. No deficits noted. Safety maintained. Will continue to monitor.
Pt AO4. Neuro check intact. No deficits noted. Safety maintained. Will continue to monitor.
07.00 Am Received Report from  OMER Hernandez.  07.30 Assessed the pt . Pt is Resting A&OX4 obeys commands Moves all 4 extremities. Carolyne fever chills Cp SOB  N/V/D afebrile here vital signs stable    .   IV no Signs of infiltration noted  IV flushing well  Pt denies pain at IV site Dressing is clean & dry . Comfort  care & safety measures continued .Continue to monitor

## 2018-02-28 NOTE — CHART NOTE - NSCHARTNOTEFT_GEN_A_CORE
Division of Electrophysiology  Device Interrogation Report    Interrogation of: Loop Recorder    Indication for Interrogation:  patient to have MRI    Report:    : [x ] [employer] [ ] [place] [ ] [place] [facility]    Model: LINQ [id number]    Battery Status: Good    Lead:               Amplitude (Sense)     Threshold      Impedance   Atrial:                 n/a  RV:                     n/a  LV:                      n/a    Comments / Events:  Patient had one episode of blocked APC on 11/14/2017. No AF/AT, no vicki episodes, no pauses.    Changes Made:  none

## 2018-02-28 NOTE — ED CDU PROVIDER DISPOSITION NOTE - CLINICAL COURSE
67M pmh TIA, hypothyroid, HLD, depression p/w word finding difficulties from neurologist office.  Pt had brief episode of word finding difficulties in front of the neurologist that resolved after 1-2 mins.  pt was having routine neuro exam when he was asked to identify a pen and though he reports he knew to say pen but couldn't get the word out and that lasted about 1-2 mins. pt reports this has happened before with his TIAs, about the 5th episode and the last episode was about 11 months ago. pt now feels fine without symptoms. No numbness, weakness associated with episode.  Denies chest pain, shortness of breath, fever/chills, nausea/vomiting/diarrhea.  pt had CTA head and neck done- no emergent findings. pt seen by Neuro- recommend cdu for observation including tele monitoring, neurologic checks and MRI. pt did well overnight, and remained asymptomatic. 67M pmh TIA, hypothyroid, HLD, depression p/w word finding difficulties from neurologist office.  Pt had brief episode of word finding difficulties in front of the neurologist that resolved after 1-2 mins.  pt was having routine neuro exam when he was asked to identify a pen and though he reports he knew to say pen but couldn't get the word out and that lasted about 1-2 mins. pt reports this has happened before with his TIAs, about the 5th episode and the last episode was about 11 months ago. pt now feels fine without symptoms. No numbness, weakness associated with episode.  Denies chest pain, shortness of breath, fever/chills, nausea/vomiting/diarrhea.  pt had CTA head and neck done- no emergent findings. pt seen by Neuro- recommend cdu for observation including tele monitoring, neurologic checks and MRI. pt did well overnight, and remained asymptomatic. pt seen and evaluated by Dr. Moreau and Neuro team. MRI neg for acute infarcts. Pt to continue the aggrenox and follow up in office as scheduled. Pt and family understand and plan to discharge discussed with Dr. Chung.

## 2018-02-28 NOTE — ED ADULT NURSE REASSESSMENT NOTE - NURSING NEURO LEVEL OF CONSCIOUSNESS
follows commands/alert and awake
follows commands/alert and awake
alert and awake/follows commands
alert and awake/follows commands

## 2018-02-28 NOTE — ED CDU PROVIDER DISPOSITION NOTE - ATTENDING CONTRIBUTION TO CARE
CDU Attending Note -- Pt seen and examined at bedside.  Case discussed c CDU PA.  Pt comfortable, asymptomatic, and has good follow up.  Seen by neuro attd, on AC, stable for d.c.  Strict return precautions provided.  --BMM

## 2018-02-28 NOTE — ED CDU PROVIDER SUBSEQUENT DAY NOTE - PROGRESS NOTE DETAILS
CDU NOTE YASEMIN Sloan: pt asleep. NAD most recent VSS. no events on tele. Pt resting comfortably. NAD. No complaints. VSS. pending MRI. spouse at bedside. pt seen and evaluated by Dr. Moreau and Neuro team. MRI neg for acute infarcts. Pt to continue the aggrenox and follow up in office as scheduled. Pt and family understand and plan to discharge discussed with Dr. Chung.

## 2018-02-28 NOTE — ED CDU PROVIDER DISPOSITION NOTE - PLAN OF CARE
1. stay hydrated.  2. continue current home medications  3. Follow up with your PCP in 1 -2 days.  4. Follow up with your Neurologist Dr. Moreau in 2-3 days.  5. Bring Printed Results to your Doctor Visits. Stroke Education given to you.  6. Return if symptoms worsen, fever, weakness, numbness, dizziness, vision change, slurred speech and all other concerns 1. stay hydrated.  2. continue current home medications; continue the Aggrenox as prescribed.   3. Follow up with your PCP in 1 -2 days.  4. Follow up with your Neurologist Dr. Moreau in 2-3 days.  5. Bring Printed Results to your Doctor Visits. Stroke Education given to you.  6. Return if symptoms worsen, fever, weakness, numbness, dizziness, vision change, slurred speech and all other concerns

## 2018-03-06 ENCOUNTER — APPOINTMENT (OUTPATIENT)
Dept: ELECTROPHYSIOLOGY | Facility: CLINIC | Age: 68
End: 2018-03-06
Payer: MEDICARE

## 2018-03-06 PROCEDURE — 93298 REM INTERROG DEV EVAL SCRMS: CPT

## 2018-03-06 PROCEDURE — 93299: CPT

## 2018-03-15 ENCOUNTER — FORM ENCOUNTER (OUTPATIENT)
Age: 68
End: 2018-03-15

## 2018-03-16 ENCOUNTER — APPOINTMENT (OUTPATIENT)
Dept: CT IMAGING | Facility: CLINIC | Age: 68
End: 2018-03-16
Payer: MEDICARE

## 2018-03-16 ENCOUNTER — OUTPATIENT (OUTPATIENT)
Dept: OUTPATIENT SERVICES | Facility: HOSPITAL | Age: 68
LOS: 1 days | End: 2018-03-16
Payer: MEDICARE

## 2018-03-16 DIAGNOSIS — Z00.8 ENCOUNTER FOR OTHER GENERAL EXAMINATION: ICD-10-CM

## 2018-03-16 PROCEDURE — 74177 CT ABD & PELVIS W/CONTRAST: CPT

## 2018-03-16 PROCEDURE — 74177 CT ABD & PELVIS W/CONTRAST: CPT | Mod: 26

## 2018-03-16 PROCEDURE — 71260 CT THORAX DX C+: CPT

## 2018-03-16 PROCEDURE — 71260 CT THORAX DX C+: CPT | Mod: 26

## 2018-04-10 ENCOUNTER — APPOINTMENT (OUTPATIENT)
Dept: CV DIAGNOSITCS | Facility: HOSPITAL | Age: 68
End: 2018-04-10

## 2018-04-10 ENCOUNTER — OUTPATIENT (OUTPATIENT)
Dept: OUTPATIENT SERVICES | Facility: HOSPITAL | Age: 68
LOS: 1 days | End: 2018-04-10
Payer: MEDICARE

## 2018-04-10 DIAGNOSIS — I25.10 ATHEROSCLEROTIC HEART DISEASE OF NATIVE CORONARY ARTERY WITHOUT ANGINA PECTORIS: ICD-10-CM

## 2018-04-10 PROCEDURE — 93306 TTE W/DOPPLER COMPLETE: CPT

## 2018-04-10 PROCEDURE — 93306 TTE W/DOPPLER COMPLETE: CPT | Mod: 26

## 2018-04-12 ENCOUNTER — APPOINTMENT (OUTPATIENT)
Dept: CV DIAGNOSITCS | Facility: HOSPITAL | Age: 68
End: 2018-04-12

## 2018-04-12 ENCOUNTER — APPOINTMENT (OUTPATIENT)
Dept: ELECTROPHYSIOLOGY | Facility: CLINIC | Age: 68
End: 2018-04-12
Payer: MEDICARE

## 2018-04-12 PROCEDURE — 93299: CPT

## 2018-04-12 PROCEDURE — 93298 REM INTERROG DEV EVAL SCRMS: CPT

## 2018-04-13 ENCOUNTER — APPOINTMENT (OUTPATIENT)
Dept: PULMONOLOGY | Facility: CLINIC | Age: 68
End: 2018-04-13
Payer: MEDICARE

## 2018-04-13 VITALS
BODY MASS INDEX: 27.98 KG/M2 | HEIGHT: 75 IN | RESPIRATION RATE: 15 BRPM | DIASTOLIC BLOOD PRESSURE: 60 MMHG | SYSTOLIC BLOOD PRESSURE: 112 MMHG | WEIGHT: 225 LBS | TEMPERATURE: 98.5 F | OXYGEN SATURATION: 98 % | HEART RATE: 68 BPM

## 2018-04-13 PROCEDURE — 99214 OFFICE O/P EST MOD 30 MIN: CPT

## 2018-04-23 ENCOUNTER — APPOINTMENT (OUTPATIENT)
Dept: CARDIOLOGY | Facility: CLINIC | Age: 68
End: 2018-04-23
Payer: MEDICARE

## 2018-04-23 ENCOUNTER — NON-APPOINTMENT (OUTPATIENT)
Age: 68
End: 2018-04-23

## 2018-04-23 VITALS
OXYGEN SATURATION: 100 % | DIASTOLIC BLOOD PRESSURE: 76 MMHG | SYSTOLIC BLOOD PRESSURE: 130 MMHG | WEIGHT: 225 LBS | HEIGHT: 75 IN | BODY MASS INDEX: 27.98 KG/M2 | HEART RATE: 54 BPM

## 2018-04-23 PROCEDURE — 93000 ELECTROCARDIOGRAM COMPLETE: CPT

## 2018-04-23 PROCEDURE — 99214 OFFICE O/P EST MOD 30 MIN: CPT

## 2018-05-08 ENCOUNTER — APPOINTMENT (OUTPATIENT)
Dept: SLEEP CENTER | Facility: CLINIC | Age: 68
End: 2018-05-08
Payer: MEDICARE

## 2018-05-08 PROCEDURE — G0400: CPT | Mod: 26

## 2018-05-09 ENCOUNTER — OUTPATIENT (OUTPATIENT)
Dept: OUTPATIENT SERVICES | Facility: HOSPITAL | Age: 68
LOS: 1 days | End: 2018-05-09
Payer: MEDICARE

## 2018-05-09 PROCEDURE — G0400: CPT

## 2018-05-15 ENCOUNTER — RESULT REVIEW (OUTPATIENT)
Age: 68
End: 2018-05-15

## 2018-05-16 DIAGNOSIS — G47.33 OBSTRUCTIVE SLEEP APNEA (ADULT) (PEDIATRIC): ICD-10-CM

## 2018-05-30 ENCOUNTER — APPOINTMENT (OUTPATIENT)
Dept: ELECTROPHYSIOLOGY | Facility: CLINIC | Age: 68
End: 2018-05-30
Payer: MEDICARE

## 2018-05-30 PROCEDURE — 93299: CPT

## 2018-05-30 PROCEDURE — 93298 REM INTERROG DEV EVAL SCRMS: CPT

## 2018-07-31 PROBLEM — G45.9 TRANSIENT CEREBRAL ISCHEMIC ATTACK, UNSPECIFIED: Chronic | Status: ACTIVE | Noted: 2017-05-15

## 2018-07-31 PROBLEM — K21.9 GASTRO-ESOPHAGEAL REFLUX DISEASE WITHOUT ESOPHAGITIS: Chronic | Status: ACTIVE | Noted: 2017-05-15

## 2018-08-03 ENCOUNTER — APPOINTMENT (OUTPATIENT)
Dept: ELECTROPHYSIOLOGY | Facility: CLINIC | Age: 68
End: 2018-08-03
Payer: MEDICARE

## 2018-08-03 PROCEDURE — 93299: CPT

## 2018-08-03 PROCEDURE — 93298 REM INTERROG DEV EVAL SCRMS: CPT

## 2018-10-02 ENCOUNTER — RX RENEWAL (OUTPATIENT)
Age: 68
End: 2018-10-02

## 2018-10-23 ENCOUNTER — APPOINTMENT (OUTPATIENT)
Dept: PULMONOLOGY | Facility: CLINIC | Age: 68
End: 2018-10-23
Payer: MEDICARE

## 2018-10-23 VITALS
RESPIRATION RATE: 16 BRPM | SYSTOLIC BLOOD PRESSURE: 144 MMHG | WEIGHT: 224 LBS | BODY MASS INDEX: 27.85 KG/M2 | HEART RATE: 85 BPM | OXYGEN SATURATION: 97 % | HEIGHT: 75 IN | TEMPERATURE: 98.5 F | DIASTOLIC BLOOD PRESSURE: 81 MMHG

## 2018-10-23 PROCEDURE — 99214 OFFICE O/P EST MOD 30 MIN: CPT

## 2018-10-26 ENCOUNTER — APPOINTMENT (OUTPATIENT)
Dept: CARDIOLOGY | Facility: CLINIC | Age: 68
End: 2018-10-26

## 2018-11-30 ENCOUNTER — OTHER (OUTPATIENT)
Age: 68
End: 2018-11-30

## 2018-12-06 PROBLEM — Z00.00 ENCOUNTER FOR PREVENTIVE HEALTH EXAMINATION: Noted: 2018-12-06

## 2018-12-11 ENCOUNTER — APPOINTMENT (OUTPATIENT)
Dept: ULTRASOUND IMAGING | Facility: HOSPITAL | Age: 68
End: 2018-12-11

## 2018-12-11 ENCOUNTER — OUTPATIENT (OUTPATIENT)
Dept: OUTPATIENT SERVICES | Facility: HOSPITAL | Age: 68
LOS: 1 days | Discharge: HOME | End: 2018-12-11
Payer: COMMERCIAL

## 2018-12-11 DIAGNOSIS — E06.3 AUTOIMMUNE THYROIDITIS: ICD-10-CM

## 2018-12-11 DIAGNOSIS — E03.9 HYPOTHYROIDISM, UNSPECIFIED: ICD-10-CM

## 2018-12-11 PROCEDURE — 76536 US EXAM OF HEAD AND NECK: CPT | Mod: 26

## 2019-02-04 ENCOUNTER — APPOINTMENT (OUTPATIENT)
Dept: ELECTROPHYSIOLOGY | Facility: CLINIC | Age: 69
End: 2019-02-04
Payer: MEDICARE

## 2019-02-04 PROCEDURE — 93299: CPT

## 2019-02-04 PROCEDURE — 93298 REM INTERROG DEV EVAL SCRMS: CPT

## 2019-02-12 ENCOUNTER — APPOINTMENT (OUTPATIENT)
Dept: NEUROLOGY | Facility: CLINIC | Age: 69
End: 2019-02-12
Payer: MEDICARE

## 2019-02-12 VITALS
BODY MASS INDEX: 27.85 KG/M2 | SYSTOLIC BLOOD PRESSURE: 129 MMHG | WEIGHT: 224 LBS | HEART RATE: 53 BPM | HEIGHT: 75 IN | DIASTOLIC BLOOD PRESSURE: 70 MMHG

## 2019-02-12 PROCEDURE — 99215 OFFICE O/P EST HI 40 MIN: CPT

## 2019-02-12 PROCEDURE — 93886 INTRACRANIAL COMPLETE STUDY: CPT

## 2019-02-12 PROCEDURE — 93892 TCD EMBOLI DETECT W/O INJ: CPT

## 2019-02-12 PROCEDURE — 93880 EXTRACRANIAL BILAT STUDY: CPT

## 2019-02-13 ENCOUNTER — OTHER (OUTPATIENT)
Age: 69
End: 2019-02-13

## 2019-02-20 ENCOUNTER — OUTPATIENT (OUTPATIENT)
Dept: OUTPATIENT SERVICES | Facility: HOSPITAL | Age: 69
LOS: 1 days | End: 2019-02-20
Payer: MEDICARE

## 2019-02-20 ENCOUNTER — APPOINTMENT (OUTPATIENT)
Dept: MRI IMAGING | Facility: CLINIC | Age: 69
End: 2019-02-20
Payer: MEDICARE

## 2019-02-20 DIAGNOSIS — Z00.8 ENCOUNTER FOR OTHER GENERAL EXAMINATION: ICD-10-CM

## 2019-02-20 PROCEDURE — 70549 MR ANGIOGRAPH NECK W/O&W/DYE: CPT | Mod: 26

## 2019-02-20 PROCEDURE — 70549 MR ANGIOGRAPH NECK W/O&W/DYE: CPT

## 2019-02-20 PROCEDURE — 70553 MRI BRAIN STEM W/O & W/DYE: CPT | Mod: 26

## 2019-02-20 PROCEDURE — 70553 MRI BRAIN STEM W/O & W/DYE: CPT

## 2019-02-20 PROCEDURE — 70546 MR ANGIOGRAPH HEAD W/O&W/DYE: CPT

## 2019-02-20 PROCEDURE — A9585: CPT

## 2019-02-27 ENCOUNTER — OTHER (OUTPATIENT)
Age: 69
End: 2019-02-27

## 2019-03-01 ENCOUNTER — APPOINTMENT (OUTPATIENT)
Dept: ULTRASOUND IMAGING | Facility: HOSPITAL | Age: 69
End: 2019-03-01

## 2019-03-08 ENCOUNTER — APPOINTMENT (OUTPATIENT)
Dept: ELECTROPHYSIOLOGY | Facility: CLINIC | Age: 69
End: 2019-03-08
Payer: MEDICARE

## 2019-03-08 VITALS
BODY MASS INDEX: 27.85 KG/M2 | HEIGHT: 75 IN | SYSTOLIC BLOOD PRESSURE: 159 MMHG | WEIGHT: 224 LBS | HEART RATE: 55 BPM | DIASTOLIC BLOOD PRESSURE: 73 MMHG | OXYGEN SATURATION: 97 %

## 2019-03-08 PROCEDURE — 93291 INTERROG DEV EVAL SCRMS IP: CPT

## 2019-03-16 ENCOUNTER — OUTPATIENT (OUTPATIENT)
Dept: OUTPATIENT SERVICES | Facility: HOSPITAL | Age: 69
LOS: 1 days | Discharge: HOME | End: 2019-03-16
Payer: MEDICARE

## 2019-03-16 ENCOUNTER — APPOINTMENT (OUTPATIENT)
Dept: ULTRASOUND IMAGING | Facility: HOSPITAL | Age: 69
End: 2019-03-16

## 2019-03-16 DIAGNOSIS — E04.1 NONTOXIC SINGLE THYROID NODULE: ICD-10-CM

## 2019-03-16 PROCEDURE — 76536 US EXAM OF HEAD AND NECK: CPT | Mod: 26

## 2019-04-05 ENCOUNTER — APPOINTMENT (OUTPATIENT)
Dept: ELECTROPHYSIOLOGY | Facility: HOSPITAL | Age: 69
End: 2019-04-05
Payer: MEDICARE

## 2019-04-05 PROCEDURE — 93298 REM INTERROG DEV EVAL SCRMS: CPT

## 2019-04-05 PROCEDURE — 93299: CPT

## 2019-04-15 ENCOUNTER — APPOINTMENT (OUTPATIENT)
Dept: NEUROLOGY | Facility: CLINIC | Age: 69
End: 2019-04-15
Payer: MEDICARE

## 2019-04-15 PROCEDURE — 96138 PSYCL/NRPSYC TECH 1ST: CPT | Mod: NC,25

## 2019-04-15 PROCEDURE — 96116 NUBHVL XM PHYS/QHP 1ST HR: CPT

## 2019-04-15 PROCEDURE — 96139 PSYCL/NRPSYC TST TECH EA: CPT | Mod: NC

## 2019-04-15 PROCEDURE — 96132 NRPSYC TST EVAL PHYS/QHP 1ST: CPT

## 2019-04-29 ENCOUNTER — APPOINTMENT (OUTPATIENT)
Dept: NEUROLOGY | Facility: CLINIC | Age: 69
End: 2019-04-29

## 2019-05-07 ENCOUNTER — APPOINTMENT (OUTPATIENT)
Dept: ELECTROPHYSIOLOGY | Facility: HOSPITAL | Age: 69
End: 2019-05-07
Payer: MEDICARE

## 2019-05-07 PROCEDURE — 93298 REM INTERROG DEV EVAL SCRMS: CPT

## 2019-05-07 PROCEDURE — 93299: CPT

## 2019-06-03 ENCOUNTER — APPOINTMENT (OUTPATIENT)
Dept: NEUROLOGY | Facility: CLINIC | Age: 69
End: 2019-06-03
Payer: MEDICARE

## 2019-06-03 VITALS
HEIGHT: 75 IN | HEART RATE: 60 BPM | SYSTOLIC BLOOD PRESSURE: 126 MMHG | BODY MASS INDEX: 27.35 KG/M2 | DIASTOLIC BLOOD PRESSURE: 75 MMHG | WEIGHT: 220 LBS

## 2019-06-03 PROCEDURE — 99215 OFFICE O/P EST HI 40 MIN: CPT
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
## 2019-06-03 NOTE — PHYSICAL EXAM
[FreeTextEntry1] : General exam: Sitting in the chair and does not appear to be in distress\par Carotid bruits: None\par CVS: S1-S2 present\par RS: CTA B\par Skin: No lesion noted on visible skin \par \par Neuro exam: \par MS: Alert, awake and is oriented to time, place and person with normal attention span, normal recent and remote memory\par Language: Fluent speech with intact comprehension, with intact naming and repetition, no right-left confusion, no finger agnosia and no apraxia. Normal fund of knowledge.\par Cr.N.: Pupils bilaterally 3-4 mm in size, equal, round and reactive to light, no papilledema, intact visual fields to confrontation, extraocular movements intact without any diplopia, no ptosis, no nystagmus, face appears to be symmetric with intact facial sensations, no hearing loss to rubbing fingers, tongue is in the midline, uvula elevates in the midline and without any drooping of the soft palate, normal shrugging of the shoulders bilaterally.\par \par Motor: \par Tone - Normal\par Bulk - No atrophy\par Power - 5/5 all over without any pronator drift\par DTRs - +2 all over and bilaterally symmetric\par Plantars: Bilaterally flexor\par Sensory: Intact to light touch and pinprick\par Cerebellar: No ataxia on finger-nose-finger and knee-heel-shin testing, as well as without any dysdiadochokinesia\par Gait: Normal casual gait with normal stride and length and was able to perform toe, heel and tandem walking\par Romberg's sign - Mild swaying with the eyes closed.

## 2019-06-03 NOTE — ASSESSMENT
[FreeTextEntry1] : Assessment:\par 69 Y/O R handed man with vascular risk factors of age, ? HTN, CAD and HPLD is seen in the vascular neurology office for the evaluation and management of an episodes concerning for TIA. He reports to have 2 episodes of language disturbance - described as difficulty getting the words out of his mouth as well as nonsensical speech lasting for a few seconds to minutes. MRI brain in 4/17 did not show any acute intracranial pathology or acute stroke but showed evidence of mild to moderate leukoaraiosis but did not show any evidence of old microhemorrhages. MRA head and neck did not show symptomatic intracranial or extracranial large vessel severe stenosis or occlusion. Transesophageal echocardiogram did not show any structural cardiac source of embolism but showed evidence of a small PFO. Lower extremity duplex and MRV pelvis did not show any paradoxical source of embolism. Hypercoagulable workup to evaluate for primary thrombophilia appears to be unrevealing to my eye. CT chest, abdomen and pelvis (3/18) did not show any evidence of metastatic malignancy but showed nonspecific lung findings. He was noted to have elevated CA 27.29 of undetermined significance and was advised to discuss it further with his PCP. He is undergoing prolonged cardiac monitoring with ICM. In 2/19, he reports to have had another episode of word finding difficulties and expressive aphasia lasting for a few seconds to a minute. MRI brain (2/20/19) did not show evidence of acute infarct or hemorrhage but showed mild to moderate white matter hyperintensities of presumed vascular origin. 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 In 5/19, he reports to have had another similar episode of expressive aphasia which was associated with headache and retro-orbital pain with positive visual phenomena lasting for a few seconds to a minute. Of note, neuropsychological evaluation (2017) demonstrated evidence of behavior or disinhibition, which would be suggestive of frontal lobes dysfunction. Of note routine EEG performed in 2017 was reported to be normal in the awake, drowsy and asleep states.\par \par Impression:\par 1. His presentation is consistent with transient left hemispheric dysfunction of undetermined etiology and differential diagnoses include transient ischemic attack involving left MCA distribution with probable mechanism being cryptogenic TIA (embolic phenomena from undetermined source) versus late life migraine accompaniment (especially with the most recent episode being associated with headache/retro-orbital pain and positive visual phenomena but duration of symptoms being atypical for migrainous phenomena) versus rule out simple/complex partial seizures without secondary generalization \par 2. Poor short-term memory likely secondary to mild cognitive impairment, considering progressive language disturbance/word finding difficulties; possibility of frontotemporal dementia/primary progressive aphasia needs to be further evaluated\par \par Plan:\par 1. TIAs: \par - Aggrenox 1 capsule twice a day (suspect clopidogrel failure) for secondary stroke prevention, indefinitely if not contraindicated due to any specific reasons in the future\par - - Statin medication (pravastatin 20 mg at bedtime) as per her home dose considering likely non-atheroembolic etiology of his symptoms and recent LDL; further dose titration is deferred to PCP/cardiologist based on medical indications/ASCVD risk profile\par - He should follow up closely with his primary care physician for optimal vascular risk factors modifications including blood pressure goal less than 130/80 mmHg, HbA1c goal <7 and preferably 6-6.5 and optimal cholesterol management \par - He is advised to check blood pressure regularly at home, preferably at different times during the day and multiple days a week and was advised to keep a log of BPs to bring to primary care physician visit for further instructions regarding optimal BP management. Also advised to followup closely with PCP regarding regular blood work including monitoring of HbA1c and cholesterol profile\par - Prolonged cardiac monitoring with ICM to screen for occult cardiac arrhythmias like atrial fibrillation being the cardiac source of embolism, as it could potentially change the measures of secondary stroke prevention. Advised to followup with his cardiologist for ICM monitoring. Possibility of starting therapeutic anticoagulation is also discussed in detail, if he is diagnosed to have A. Fib in the future.\par - Follow up with sleep disorder specialist for optimal management of moderate BHARATHI as a vascular risk factor \par - Advised to followup closely with his PCP regarding age and risk factors appropriate screening for occult malignancy\par - CUS and TCDs to be performed annually to followup on intracranial/extracranial cerebral vasculature\par - Advised to consult/followup with his PCP regarding evaluation and management of thyroid nodule incidentally noted on CUS \par - 72 hours ambulatory EEG to evaluate for possible epileptiform discharges or nonconvulsive seizures, low clinical suspicion\par - Safety precautions were discussed with patient\par \par 2. Poor short term memory:\par - Vitamin B1, folate, TSH and RPR - within normal limits\par - Continue with vitamin B12 supplement as per his PCP. He was to follow up with his PCP regarding evaluation and optimal management of vitamin B12 deficiency\par - Would follow up on results of recently performed neuropsychological testing \par - No indications for pharmacological therapy as his short-term memory problems are not reported to interfere with his abilities to perform his ADLs\par - Referral to Dr. Vigil for further evaluation and optimal management of his mild cognitive impairment\par \par - Above mentioned plan was discussed with patient in detail. I have answered all his questions to the best of my abilities. I have reviewed measures for secondary stroke prevention with the patient, including aggressive vascular risk factors modification, healthy diet, regular exercise and medication compliance. As well as discussed the need for calling 911 immediately in case of any symptoms concerning for stroke in the future. \par - I would follow him in the office in about 3 months months or earlier as needed. Also advised to contact me upon completion of imaging studies and/or laboratory testing/investigations to discuss the results over the phone.

## 2019-06-10 ENCOUNTER — APPOINTMENT (OUTPATIENT)
Dept: ELECTROPHYSIOLOGY | Facility: CLINIC | Age: 69
End: 2019-06-10
Payer: MEDICARE

## 2019-06-10 PROCEDURE — 93298 REM INTERROG DEV EVAL SCRMS: CPT

## 2019-06-10 PROCEDURE — 93299: CPT

## 2019-07-11 ENCOUNTER — APPOINTMENT (OUTPATIENT)
Dept: ELECTROPHYSIOLOGY | Facility: CLINIC | Age: 69
End: 2019-07-11
Payer: MEDICARE

## 2019-07-11 ENCOUNTER — APPOINTMENT (OUTPATIENT)
Dept: NEUROLOGY | Facility: CLINIC | Age: 69
End: 2019-07-11

## 2019-07-11 PROCEDURE — 93299: CPT

## 2019-07-11 PROCEDURE — 93298 REM INTERROG DEV EVAL SCRMS: CPT

## 2019-08-01 ENCOUNTER — APPOINTMENT (OUTPATIENT)
Dept: NEUROLOGY | Facility: CLINIC | Age: 69
End: 2019-08-01
Payer: MEDICARE

## 2019-08-01 PROCEDURE — 95816 EEG AWAKE AND DROWSY: CPT

## 2019-08-02 PROCEDURE — 95953: CPT

## 2019-08-03 PROCEDURE — 95953: CPT

## 2019-08-04 PROCEDURE — 95953: CPT

## 2019-08-06 ENCOUNTER — OTHER (OUTPATIENT)
Age: 69
End: 2019-08-06

## 2019-08-08 ENCOUNTER — OTHER (OUTPATIENT)
Age: 69
End: 2019-08-08

## 2019-08-13 ENCOUNTER — APPOINTMENT (OUTPATIENT)
Dept: ELECTROPHYSIOLOGY | Facility: CLINIC | Age: 69
End: 2019-08-13
Payer: MEDICARE

## 2019-08-13 PROCEDURE — 93299: CPT

## 2019-08-13 PROCEDURE — 93298 REM INTERROG DEV EVAL SCRMS: CPT

## 2019-09-03 ENCOUNTER — APPOINTMENT (OUTPATIENT)
Dept: NEUROLOGY | Facility: CLINIC | Age: 69
End: 2019-09-03
Payer: MEDICARE

## 2019-09-03 VITALS
BODY MASS INDEX: 26.98 KG/M2 | HEIGHT: 75 IN | HEART RATE: 57 BPM | SYSTOLIC BLOOD PRESSURE: 127 MMHG | WEIGHT: 217 LBS | DIASTOLIC BLOOD PRESSURE: 73 MMHG

## 2019-09-03 PROCEDURE — 99215 OFFICE O/P EST HI 40 MIN: CPT

## 2019-09-06 ENCOUNTER — APPOINTMENT (OUTPATIENT)
Dept: ELECTROPHYSIOLOGY | Facility: CLINIC | Age: 69
End: 2019-09-06
Payer: MEDICARE

## 2019-09-06 VITALS
BODY MASS INDEX: 26.98 KG/M2 | HEART RATE: 62 BPM | WEIGHT: 217 LBS | HEIGHT: 75 IN | SYSTOLIC BLOOD PRESSURE: 127 MMHG | DIASTOLIC BLOOD PRESSURE: 76 MMHG | OXYGEN SATURATION: 97 %

## 2019-09-06 PROCEDURE — 93291 INTERROG DEV EVAL SCRMS IP: CPT

## 2019-09-10 ENCOUNTER — OTHER (OUTPATIENT)
Age: 69
End: 2019-09-10

## 2019-09-25 ENCOUNTER — NON-APPOINTMENT (OUTPATIENT)
Age: 69
End: 2019-09-25

## 2019-09-25 ENCOUNTER — APPOINTMENT (OUTPATIENT)
Dept: NEUROLOGY | Facility: CLINIC | Age: 69
End: 2019-09-25
Payer: MEDICARE

## 2019-09-25 VITALS
HEART RATE: 68 BPM | DIASTOLIC BLOOD PRESSURE: 82 MMHG | HEIGHT: 75 IN | BODY MASS INDEX: 27.6 KG/M2 | WEIGHT: 222 LBS | SYSTOLIC BLOOD PRESSURE: 132 MMHG

## 2019-09-25 DIAGNOSIS — F19.11 OTHER PSYCHOACTIVE SUBSTANCE ABUSE, IN REMISSION: ICD-10-CM

## 2019-09-25 DIAGNOSIS — F41.8 OTHER SPECIFIED ANXIETY DISORDERS: ICD-10-CM

## 2019-09-25 PROCEDURE — 99215 OFFICE O/P EST HI 40 MIN: CPT

## 2019-09-25 PROCEDURE — 96116 NUBHVL XM PHYS/QHP 1ST HR: CPT | Mod: 59

## 2019-09-25 RX ORDER — MULTIVIT-MIN/FOLIC/VIT K/LYCOP 400-300MCG
1000 TABLET ORAL
Refills: 0 | Status: ACTIVE | COMMUNITY

## 2019-09-25 RX ORDER — GLUCOSAMINE HCL 500 MG
75 MCG TABLET ORAL
Refills: 0 | Status: ACTIVE | COMMUNITY

## 2019-09-25 RX ORDER — CHLORHEXIDINE GLUCONATE 4 %
1000 LIQUID (ML) TOPICAL
Refills: 0 | Status: ACTIVE | COMMUNITY
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
## 2019-09-25 NOTE — PHYSICAL EXAM
[FreeTextEntry1] : General appearance: The patient is awake and alert, in no acute distress.\par Eyes: PERRL, moist conjunctiva, no scleral icterus.\par ENT: Oropharynx clear of any exudate or lesions. Dentition unremarkable. No lesions on lips or gums.\par Neck: supple, trachea midline. \par Pulmonary: Normal respiratory effort, no audible wheezing.\par Cardiac: Regular rate and rhythm. \par Vascular: No peripheral edema.\par Musculoskeletal: Gait and strength as noted in "Neurologic Examination" below. No clubbing or cyanosis. Normal range of motion.\par Skin: Warm and dry. No rashes or lesions. No bruising.\par Neurologic: See separate "Neurologic Examination" below.\par Psychiatric: Mood, affect and orientation as noted below in "Extended Neurobehavioral Examination".\par \par \par \par NEUROLOGIC EXAMINATION\par \par Cranial Nerves: \par visual fields full to confrontation\par pupils equal round and reactive to light\par extraocular motion intact without nystagmus\par facial sensation intact symmetrically\par face mild right weakness\par hearing intact to conversation bilaterally\par palate raises symmetrically, head turning and shoulder shrug symmetric, tongue midline without deviation with protrusion.\par No dysarthria.\par Motor: muscle tone normal\par            no pronator drift\par            fine finger movements symmetric \par            muscle strength normal in all 4 extremities and normal bulk in all 4 extremities\par Sensory exam: light touch was intact. Romberg's sign was negative\par Coordination: no tremor\par                       intact with finger to nose bilaterally\par                       balance was intact\par Gait: steady with a narrow base, able to walk on heels and toes, able to tandem\par Deep tendon reflexes: 1-2+ at brachioradialis, biceps, triceps, and patellar bilaterally\par Primitive reflexes: No grasp reflex. No palmomental reflex. \par Cortical Sensory signs: No extinction to double simultaneous stimulation.\par \par \par \par Activities of Daily Living (Breen): independent vs. needs some help vs. unable/needs major assistance.      \par            --responses were the following: except where noted,      indep                                     \par 1. Bathing/showering\par 2. Dressing\par 3. Toileting\par 4. Transferring\par 5. Continence\par 6. Feeding                                                                                                                                                           \par \par Instrumental Activities of Daily Living (Ernesto-Benjamin): independent vs. needs some help vs. unable/needs major assistance.     \par            --responses were the following: except where noted, indep\par 1. Ability to use telephone\par 2. Shopping\par 3. Food preparation\par 4. Housekeeping\par 5. Laundry\par 6. Transportation (public/arranging rides/driving)\par 7. Responsibility for own medications\par 8. Ability to handle finances\par

## 2019-09-25 NOTE — ASSESSMENT
[FreeTextEntry1] : The patient is a 69 year old right handed man with hx TIAs of wfd since 2015 and subejctive sense of decline in word finding since approx 2017. Intact IADLs. Neuropsyhcological testing 2017 and 2019 within normal limtis. Neurobehavioral status testing shows he had dec executive control of figure copy and needed prompt for abstraction, better semantic than phonemic fluency, dec spontaneous recall but fully responded to cues. \par \par Counseled that there is no evidence for a neurodegenerative disorder, specifically no symptoms concerning for PPA which was the consult question from Dr. Moreau. Of note, MCI is also not an appropriate label because his neuropsych testing was within normal limits and without any decline. 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 \par He has a hx of etoh and drug use which could have accelerated brain aging, sober since 1983/1988. He has hx of depression and anxiety and is on medication and feels well regulated now, but may be having side effects since paxil is anticholinergic and can affect memory. He reports b12 def but is on supplement now, this could also have contributed.\par \par Counseled on strategies for maintaining cognitive health.

## 2019-09-25 NOTE — PROCEDURE
[FreeTextEntry1] : EXTENDED NEUROBEHAVIORAL STATUS TESTING\par \par [This is a separate procedure note for the Neurobehavioral Status Examination that was performed during the encounter]. \par \par The patient was alert, well groomed, NAD. He was fluent without paraphasic errors. No anomia in conversation. Comprehension was intact. He had intact energy level. He appeared euthymic and reactive. Personality was to make some jokes and ask some more personal questions but he was not offensive and established rapport. No inappropriate behavior. No psychomotor slowing.\par \par recent memory: can tell me about recent sporting events and outcomes. can tell me about his views vs his wife they have opposite views. can tell me about news stories from this week. \par \par MoCA (version 8.1) score out of 30: 26\par Memory index score (MIS) out of 15: 13\par Visuospatial/Executive \par 	Trails: intact\par 	Cube: (-1) piecemeal, 3d but inaccurate\par 	Clock: intact\par Naming: intact\par Memory- registration: intact\par Attention \par 	Digit span 5F: intact\par 	Digit span 3R: intact\par 	Letter A test: intact\par 	Serial 7 subtraction: intact 5/5\par Language \par 	Phrase repetition: intact\par 	F word fluency- # words: 14\par Abstraction\par 	Train/bicycle: transportation\par 	Watch/ruler: (-1) numbers- got with prompting\par Delayed recall score out of 5: (-2) 3\par 	Additional words recalled with category cue: 2\par 	Additional words recalled with multiple choice cue: n/a\par Orientation: intact including address\par \par Additional neurobehavioral status tests:\par Animal naming fluency- # words: 24\par Praxis\par       Ideomotor limb\par             Transitive\par 	    Brush teeth: intact b/l\par 	    Comb hair: intact b/l\par             Intransitive\par 	    Wave goodbye: intact b/l\par 	    Motion "come here": intact b/l\par       Meaningless gestures: intact to 4/4\par Right/Left orientation\par 	"Show me your right hand": correct \par 	"Show me your left hand": correct \par 	"With your right hand touch your left ear": correct\par 	"With your right hand, point to my left shoulder": correct\par 	"With your left hand, point to my left ear": correct\par \par INTERPRETATION: \par \par I carefully reviewed the above results of neurobehavioral status testing. The cognitive domains are listed below with my interpretation of whether or not there is an impairment or if performance was within normal limits. \par It should be noted that this testing is distinct from standard neuropsychological testing, which compares the patient's raw scores on different validated batteries of tests to those of their age-matched peers. Therefore subtle deficits may not be apparent on this testing, or instead some incorrect responses may overestimate deficits.\par \par Cognitive domains:\par 	Attention: within normal limits \par 	Working memory: within normal limits \par 	Executive function: dec exec control of figure copy and abstraction required prompting\par 	Language: better semantic than phonemic fluency\par 	Memory: intact in conversation. dec recall on testing but fully responded to cues without false positives\par 	Visuospatial function: dec exec control\par 	Praxis: within normal limits \par 	Behavior/Mood: personality to make jokes, euthymic and reactive\par 	Other comments: fully oriented. no psychomotor slowing\par \par Please refer to the assessment section of this encounter that documents how to incorporate the interpretation of these results into the patient's diagnosis and plan of care.\par \par 35 min were taken to administer and interpret this extended neurobehavioral evaluation and prepare the report. \par \par Testing start time: 11:35a\par Testing stop time: 11:55a\par Report time: 15 min\par

## 2019-09-25 NOTE — DATA REVIEWED
[de-identified] : \par mri 12/25/12 (dysarthria) L BG lancune. microvascular ischemic disease \par i prsonally revewed and agree, mild scattered microvascular ischemic disease \par \par mri 4/15/17 mild microvascular ischemic disease chrnic l caudate infarct\par i personally reviewed and agree\par \par mra h/n 4/15/17 neg\par \par mri 6/15/16 microvascular ischemic disease \par \par mri 2/28/18 scalttered microvascular ischemic disease \par i presonally reviewed and agree\par \par mri 2/20/19 no change mild volume loss but progresive microvascular ischemic disease \par i perosnally reviewed and see scattered mild-mod microvascular ischemic disease which is inc from 2'18, sylvian fissures are a little deep, swi neg. [de-identified] : \par eeg 6/27/17 nl no epileptiform\par eeg 8/1/19 nl\par eeg 8/1-8/4/19 nl, no correlate with pushbutton events [de-identified] : \par neurpsychological tsting 4/25/17 with Dr. Roa. 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 No e/o difficultt with attention, language, vsp, memory, or motor function. [24 animals named in 1 min. ]\par \par  neuropsyhcological tesitng 4/15/19 with Dr. Roa. He had intact cognitive function, fine motor speed was an area of isolated weakness. no difficulty in attention, eec function, language, vsp, memory. [24 animals in 1 min].

## 2019-09-26 ENCOUNTER — RX RENEWAL (OUTPATIENT)
Age: 69
End: 2019-09-26

## 2019-10-07 ENCOUNTER — APPOINTMENT (OUTPATIENT)
Dept: ELECTROPHYSIOLOGY | Facility: CLINIC | Age: 69
End: 2019-10-07

## 2019-11-07 ENCOUNTER — APPOINTMENT (OUTPATIENT)
Dept: ELECTROPHYSIOLOGY | Facility: CLINIC | Age: 69
End: 2019-11-07
Payer: MEDICARE

## 2019-11-07 PROCEDURE — 93299: CPT

## 2019-11-07 PROCEDURE — 93298 REM INTERROG DEV EVAL SCRMS: CPT

## 2019-12-10 ENCOUNTER — APPOINTMENT (OUTPATIENT)
Dept: ELECTROPHYSIOLOGY | Facility: CLINIC | Age: 69
End: 2019-12-10
Payer: MEDICARE

## 2019-12-10 PROCEDURE — 93298 REM INTERROG DEV EVAL SCRMS: CPT

## 2019-12-10 PROCEDURE — 93299: CPT

## 2019-12-18 ENCOUNTER — APPOINTMENT (OUTPATIENT)
Dept: NEUROLOGY | Facility: CLINIC | Age: 69
End: 2019-12-18

## 2020-01-10 ENCOUNTER — APPOINTMENT (OUTPATIENT)
Dept: ELECTROPHYSIOLOGY | Facility: CLINIC | Age: 70
End: 2020-01-10
Payer: MEDICARE

## 2020-01-10 PROCEDURE — G2066: CPT

## 2020-01-10 PROCEDURE — 93298 REM INTERROG DEV EVAL SCRMS: CPT

## 2020-01-17 ENCOUNTER — APPOINTMENT (OUTPATIENT)
Dept: CARDIOLOGY | Facility: CLINIC | Age: 70
End: 2020-01-17

## 2020-01-23 ENCOUNTER — APPOINTMENT (OUTPATIENT)
Dept: NEUROLOGY | Facility: CLINIC | Age: 70
End: 2020-01-23
Payer: MEDICARE

## 2020-01-23 VITALS
DIASTOLIC BLOOD PRESSURE: 72 MMHG | SYSTOLIC BLOOD PRESSURE: 148 MMHG | WEIGHT: 223 LBS | HEIGHT: 75 IN | HEART RATE: 46 BPM | BODY MASS INDEX: 27.73 KG/M2

## 2020-01-23 DIAGNOSIS — I67.9 CEREBROVASCULAR DISEASE, UNSPECIFIED: ICD-10-CM

## 2020-01-23 PROCEDURE — 93892 TCD EMBOLI DETECT W/O INJ: CPT

## 2020-01-23 PROCEDURE — 93886 INTRACRANIAL COMPLETE STUDY: CPT

## 2020-01-23 PROCEDURE — 93880 EXTRACRANIAL BILAT STUDY: CPT

## 2020-01-23 PROCEDURE — 99215 OFFICE O/P EST HI 40 MIN: CPT

## 2020-01-23 NOTE — DISCUSSION/SUMMARY
[Antithrombotic therapy with ___] : antithrombotic therapy with  [unfilled] [Intensive Blood Pressure Control] : intensive blood pressure control [Lipid Lowering Therapy] : lipid lowering therapy [Patient encouraged to discuss with Primary MD] : I encouraged the patient to discuss these important issues with ~his/her~ primary care doctor [Goals and Counseling] : I have reviewed the goals of stroke risk factor modification. I counseled the patient on measures to reduce stroke risk, including the importance of medication compliance, risk factor control, exercise, healthy diet and avoidance of smoking. I reviewed stroke warning signs and symptoms and appropriate actions to take if such occur. [FreeTextEntry1] : Assessment (From Dr. Moreau's note)\par Mr. Jacobs is a 68 Y/O R handed man with vascular risk factors of age, ? HTN, CAD and HPLD is seen in the vascular neurology office for the evaluation and management of an episodes concerning for TIA. He reports to have 2 episodes of language disturbance - described as difficulty getting the words out of his mouth as well as nonsensical speech lasting for a few seconds to minutes. MRI brain in 4/17 did not show any acute intracranial pathology or acute stroke but showed evidence of mild to moderate leukoaraiosis but did not show any evidence of old microhemorrhages. MRA head and neck did not show symptomatic intracranial or extracranial large vessel severe stenosis or occlusion. Transesophageal echocardiogram did not show any structural cardiac source of embolism but showed evidence of a small PFO. Lower extremity duplex and MRV pelvis did not show any paradoxical source of embolism. Hypercoagulable workup to evaluate for primary thrombophilia appears to be unrevealing to my eye. CT chest, abdomen and pelvis (3/18) did not show any evidence of metastatic malignancy but showed nonspecific lung findings. He was noted to have elevated CA 27.29 of undetermined significance and was advised to discuss it further with his PCP. He is undergoing prolonged cardiac monitoring with ICM. In 2/19, he reports to have had another episode of word finding difficulties and expressive aphasia lasting for a few seconds to a minute. MRI brain (2/20/19) did not show evidence of acute infarct or hemorrhage but showed mild to moderate white matter hyperintensities of presumed vascular origin. 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 In 5/19, he reports to have had another similar episode of expressive aphasia which was associated with headache and retro-orbital pain with positive visual phenomena lasting for a few seconds to a minute. Of note, neuropsychological evaluation (2017) demonstrated evidence of behavior or disinhibition, which would be suggestive of frontal lobes dysfunction. Of note routine EEG performed in 2017 was reported to be normal in the awake, drowsy and asleep states.\par \par He has been symptom free for > 2 years - no episodes of aphasia. \par \par \par Impression:\par 1. His presentation is consistent with transient left hemispheric dysfunction of undetermined etiology and differential diagnoses include transient ischemic attack involving left MCA distribution with probable mechanism being cryptogenic TIA (embolic phenomena from undetermined source) versus late life migraine accompaniment (especially with the most recent episode being associated with headache/retro-orbital pain and positive visual phenomena but duration of symptoms being atypical for migrainous phenomena) versus rule out simple/complex partial seizures without secondary generalization \par 2. Poor short-term memory likely secondary to mild cognitive impairment, considering progressive language disturbance/word finding difficulties; possibility of frontotemporal dementia/primary progressive aphasia needs to be further evaluated\par \par Plan:\par 1. TIAs: \par - Aggrenox 1 capsule twice a day (suspect clopidogrel failure) for secondary stroke prevention, indefinitely if not contraindicated due to any specific reasons in the future\par - Statin medication (pravastatin 20 mg at bedtime) as per her home dose considering likely nonatherosclerotic etiology of his symptoms and recent LDL; further dose titration is deferred to PCP/cardiologist based on medical indications/ASCVD risk profile\par \par - He should follow up closely with his primary care physician for optimal vascular risk factors modifications including blood pressure goal less than 130/80 mmHg, HbA1c goal <7 and preferably 6-6.5 and optimal cholesterol management \par - He is advised to check blood pressure regularly at home, preferably at different times during the day and multiple days a week and was advised to keep a log of BPs to bring to primary care physician visit for further instructions regarding optimal BP management. Also advised to followup closely with PCP regarding regular blood work including monitoring of HbA1c and cholesterol profile\par - Prolonged cardiac monitoring with ICM to screen for occult cardiac arrhythmias like atrial fibrillation being the cardiac source of embolism, as it could potentially change the measures of secondary stroke prevention. Advised to followup with his cardiologist for ICM monitoring. Possibility of starting therapeutic anticoagulation is also discussed in detail, if he is diagnosed to have A. Fib in the future.\par - Advised to followup closely with his PCP regarding age and risk factors appropriate screening for occult malignancy\par - CUS and TCDs reviewed \par - Advised to consult/followup with his PCP regarding evaluation and management of thyroid nodule incidentally noted on CUS \par - 72 hours ambulatory EEG to evaluate for possible epileptiform discharges or nonconvulsive seizures, low clinical suspicion - negative \par - Safety precautions were discussed with patient\par \par - Advised to follow up with PCP regarding further evaluation of elevated CA 27.29 from 2018 - patient unsure if he ever followed up. Lab work and note sent to be sent to PCP for further evaluation - called office and they are closed until 9/10 with no fax number provided. \par \par 2. Poor short term memory:\par - Vitamin B1, folate, TSH and RPR - within normal limits\par - Continue with vitamin B12 supplement as per his PCP. He was to follow up with his PCP regarding evaluation and optimal management of vitamin B12 deficiency\par - Would follow up on results of recently performed neuropsychological testing - completed \par \par ** Patient reports that the labs ordered by Dr. Moreau in 3/2018 are not being covered by insurance. \par Letter was written in 11/18 by Deonna Krishna and Dr. Moreau. Letter printed and advised patient to call me if any other letters are needed for insurance.\par He is appealing for the tests, if any information is required from our office we will provide. the tests including oncologic hypercoagulable panel were necessary  due to his recurrent symptoms of TIA.\par \par \par PCP: Dr. Yanes [phone] ([facility]

## 2020-01-23 NOTE — HISTORY OF PRESENT ILLNESS
[FreeTextEntry1] : 69 Y/O R handed man with vascular risk factors of age, ? HTN, CAD, HPLD and newly diagnosed BHARATHI - on CPAP is seen in the vascular neurology office for the evaluation and management of an episode concerning for TIA. \par \par From Dr. Moreau's note: \par He reports that he has had 2 episodes in the past concerning for TIAs. Few years (1.5 years) ago, he was talking on the phone and suddenly developed language disturbance described as a combination of difficulty getting the words out of his mouth (though knew what he wanted to say) and non-sensical speech. His symptoms lasted for few seconds to a minute. He presented to the hospital but was not sure about the exact diagnosis at that time. In 5/2016, he had similar episode in the form of trouble getting the words out of the mouth and trouble expressing himself lasting for a few minutes with spontaneous resolution of his neurological deficits. He was evaluated by Dr. Jacobs (neurologist) at that time and was diagnosed to have TIA. Of note, he denies any history of migraines headaches or any visual aura in the past. He was taking aspirin before the first episode of TIA due to his history of CAD. His aspirin was switched to clopidogrel in 6/2016 after his second episode. He reports compliance with his medications and denies any significant side effects. He denies any similar episodes since 5/2016. He also denies any palpitation in the past. He denies any focal weakness, focal sensory changes, vision or language disturbance except described before, dysphagia or dysarthria in the past. He reports to have some short term memory disturbance without affecting his ability to perform ADLs. He also reported to have some trouble with the sense of directions. He is also reported to have some trouble with language in the form of trouble getting the words out of his mouth since last 2-3 months, which are reported to be getting worse lately. He is not reported to have any change in his behavior or personality lately. Of note, he has tried Lipitor and Crestor, which he was not able to tolerate due to side effects. On 3/29/17, he was driving and had acute onset of language disturbance lasting for about a minute and was described as not being able to get the words out of his mouth. He denies any headaches or any migrainous features at that time. \par \par In 2/19, he had another episode of word finding difficulties lasting for about 30 seconds to a minute associated with headache and left retroorbital pain as well as vision disturbance in the form of "blurring of the vision - described as positive visual phenomena in the form of bright light over the left side, unsure being monocular vs. binocular lasting for few minutes and reports that junction of the positive visual phenomena and normal vision was "wavy and not sharp". \par \par ROS: All negative except documented in the history of present illness.\par \par Workup:\par MRI brain (4/15/17): No acute stroke, mild leukoaraiosis, no microhemorrhages\par MRA head and neck (4/50/17): No evidence of intracranial or extracranial large vessel severe stenosis of occlusion\par LDL: 120 (4/17)\par HbA1c: 5.6 (4/17)\par Transesophageal echocardiogram: Normal mitral glaucoma normal left atrium, no left atrial or left atrial appendage thrombus, normal left ventricle systolic function, mild apical hypokinesis, no left ventricular thrombus, agitated saline injection and color flow Doppler demonstrate evidence of small PFO\par MRV pelvis: No evidence of pelvic vein thrombosis\par Lower extremity duplex: No evidence of DVT\par Vitamin B1, folate, and RPR: Within normal limits \par Vitamin B12 - 333\par Prolonged cardiac monitoring with ICM: No A Fib yet \par \par Home medications:\par Review with the patient and updated as appropriate.  \par \par Today he denies any new or worsening neurological signs or symptoms of stroke, TIA, and/or bleeding. He is reportedly compliant with his medication regimen and denies any adverse reactions. Denies any history of DVT, PE. He has a ILR with no reported events as yet. He has undergone neuropsychological testing and has not yet followed up with a memory disorder specialist. He reports his memory continues to be a issue and is slightly affecting his ADLs.

## 2020-01-27 ENCOUNTER — NON-APPOINTMENT (OUTPATIENT)
Age: 70
End: 2020-01-27

## 2020-01-27 ENCOUNTER — APPOINTMENT (OUTPATIENT)
Dept: CARDIOLOGY | Facility: CLINIC | Age: 70
End: 2020-01-27
Payer: MEDICARE

## 2020-01-27 VITALS
BODY MASS INDEX: 27.73 KG/M2 | WEIGHT: 223 LBS | OXYGEN SATURATION: 98 % | DIASTOLIC BLOOD PRESSURE: 70 MMHG | HEIGHT: 75 IN | SYSTOLIC BLOOD PRESSURE: 135 MMHG | HEART RATE: 53 BPM

## 2020-01-27 PROCEDURE — 99215 OFFICE O/P EST HI 40 MIN: CPT

## 2020-01-27 PROCEDURE — 93000 ELECTROCARDIOGRAM COMPLETE: CPT

## 2020-02-10 ENCOUNTER — APPOINTMENT (OUTPATIENT)
Dept: ELECTROPHYSIOLOGY | Facility: CLINIC | Age: 70
End: 2020-02-10
Payer: MEDICARE

## 2020-02-10 PROCEDURE — G2066: CPT

## 2020-02-10 PROCEDURE — 93298 REM INTERROG DEV EVAL SCRMS: CPT

## 2020-02-16 NOTE — PHYSICAL EXAM
[General Appearance - Well Developed] : well developed [Normal Appearance] : normal appearance [Well Groomed] : well groomed [General Appearance - Well Nourished] : well nourished [No Deformities] : no deformities [General Appearance - In No Acute Distress] : no acute distress [Normal Conjunctiva] : the conjunctiva exhibited no abnormalities [Eyelids - No Xanthelasma] : the eyelids demonstrated no xanthelasmas [Normal Oral Mucosa] : normal oral mucosa [No Oral Pallor] : no oral pallor [No Oral Cyanosis] : no oral cyanosis [Normal Jugular Venous A Waves Present] : normal jugular venous A waves present [Normal Jugular Venous V Waves Present] : normal jugular venous V waves present [No Jugular Venous Colbert A Waves] : no jugular venous colbert A waves [Respiration, Rhythm And Depth] : normal respiratory rhythm and effort [Auscultation Breath Sounds / Voice Sounds] : lungs were clear to auscultation bilaterally [Exaggerated Use Of Accessory Muscles For Inspiration] : no accessory muscle use [Heart Sounds] : normal S1 and S2 [Heart Rate And Rhythm] : heart rate and rhythm were normal [Murmurs] : no murmurs present [Abdomen Soft] : soft [Abdomen Tenderness] : non-tender [Abdomen Mass (___ Cm)] : no abdominal mass palpated [Gait - Sufficient For Exercise Testing] : the gait was sufficient for exercise testing [Abnormal Walk] : normal gait [Nail Clubbing] : no clubbing of the fingernails [Cyanosis, Localized] : no localized cyanosis [Petechial Hemorrhages (___cm)] : no petechial hemorrhages [Skin Color & Pigmentation] : normal skin color and pigmentation [No Venous Stasis] : no venous stasis [] : no rash [Skin Lesions] : no skin lesions [No Skin Ulcers] : no skin ulcer [No Xanthoma] : no  xanthoma was observed [Oriented To Time, Place, And Person] : oriented to person, place, and time [Affect] : the affect was normal [Mood] : the mood was normal [No Anxiety] : not feeling anxious

## 2020-02-16 NOTE — DISCUSSION/SUMMARY
[FreeTextEntry1] : CAD - new sx of angina and SOB.  Plan nuclear stress test and ECHO\par \par HTN- stable\par \par Lipids- controlled\par \par Followup in 3 weeks\par \par Cont MEDS

## 2020-02-16 NOTE — ADDENDUM
[FreeTextEntry1] : Patient is asymptomatic and is at low risk for cardiac complications of non cardiac procedure.  Aspirin and dipyridamole may be held for 7 days periprocedure

## 2020-02-18 ENCOUNTER — APPOINTMENT (OUTPATIENT)
Dept: CV DIAGNOSTICS | Facility: HOSPITAL | Age: 70
End: 2020-02-18

## 2020-02-24 ENCOUNTER — APPOINTMENT (OUTPATIENT)
Dept: CV DIAGNOSTICS | Facility: HOSPITAL | Age: 70
End: 2020-02-24

## 2020-02-24 ENCOUNTER — APPOINTMENT (OUTPATIENT)
Dept: PULMONOLOGY | Facility: CLINIC | Age: 70
End: 2020-02-24

## 2020-02-27 ENCOUNTER — APPOINTMENT (OUTPATIENT)
Dept: PULMONOLOGY | Facility: CLINIC | Age: 70
End: 2020-02-27
Payer: MEDICARE

## 2020-02-27 VITALS
TEMPERATURE: 97.3 F | OXYGEN SATURATION: 98 % | HEART RATE: 52 BPM | WEIGHT: 219 LBS | HEIGHT: 75 IN | DIASTOLIC BLOOD PRESSURE: 73 MMHG | SYSTOLIC BLOOD PRESSURE: 166 MMHG | RESPIRATION RATE: 16 BRPM | BODY MASS INDEX: 27.23 KG/M2

## 2020-02-27 PROCEDURE — 99213 OFFICE O/P EST LOW 20 MIN: CPT

## 2020-02-28 NOTE — ASSESSMENT
[FreeTextEntry1] : 69 year old SMITH CHAN with TIA, CAD, HTN, HLD, hypothyroidism, former smoker, anemia; has recently been intolerant of CPAP therapy to treat moderate obstructive sleep apnea due to rhinorrhea and sneezing.\par \par A review of therapeutic and compliance data through 1/2/20 reveals usage on 36.7% of nights averaging 4 hours 4 minutes; effective pressure at 9 cmH2O with therapy based AHI of 2.6, with excessive mask leakage.   \par \par The patient is clinically benefitting from CPAP therapy with improvement in hypersomnolence (Lander sleepiness scale of 17 improved to 10 today) and sleep fragmentation has resolved, although patient does not acknowledge benefit from CPAP therapy.  An order for [facility] to renew supplies was entered to continue PAP therapy at the current pressure setting.  \par \par Mask leak - patient will resume CPAP with switch from full-face to nasal mask.\par Rhinorrhea - humidification was increased from 1 to 2, and tube temperature was increased from 3 to 4 via [employer].  Patient will call DME company or check you-tube for instructions on how to adjust these himself for self-comfort.\par \par He will follow up in one year or sooner if needed.

## 2020-02-28 NOTE — PHYSICAL EXAM
[Well Groomed] : well groomed [Normal Appearance] : normal appearance [General Appearance - In No Acute Distress] : no acute distress [Normal Conjunctiva] : the conjunctiva exhibited no abnormalities [Normal Oropharynx] : normal oropharynx [I] : I [Heart Rate And Rhythm] : heart rate was normal and rhythm regular [Murmurs] : no murmurs [Neck Appearance] : the appearance of the neck was normal [Auscultation Breath Sounds / Voice Sounds] : lungs were clear to auscultation bilaterally [] : no respiratory distress [Involuntary Movements] : no involuntary movements were seen [No Focal Deficits] : no focal deficits [Mood] : the mood was normal [Affect] : the affect was normal [FreeTextEntry2] : no edema

## 2020-02-28 NOTE — REVIEW OF SYSTEMS
[Fatigue] : fatigue [Postnasal Drip] : postnasal drip [Shortness Of Breath] : shortness of breath [Thyroid Disease] : thyroid disease [Anemia] : anemia [Heartburn] : heartburn [Depression] : depression [Anxious] : anxious [Snoring] : snoring [Daytime Somnolence: ESS=____] : daytime somnolence: ESS=[unfilled] [Unintentional Sleep while inactive] : unintentional sleep while inactive [Awakes Unrefreshed] : nonrestorative sleep [Awakes With Dry Mouth] : awakes with dry mouth [Nasal Congestion] : no nasal congestion [Chest Pain] : no chest pain [Palpitations] : no palpitations [Orthopnea] : no orthopnea [Edema] : ~T edema was not present [Obesity] : not obese [Diabetes] : no diabetes  [A.M. Headache] : no headache present upon awakening [Nocturia] : no nocturia [Frequent Nocturnal Awakenings] : no nocturnal awakenings from sleep [Witnessed Apneas] : demonstrated no ~M apnea [Arthralgias] : no arthralgias [Awakes With Headache] : awakes without a headache [Unintentional Sleep while active] : no unintentional sleep while active [Recent Wt Loss (___ Lbs)] : no recent weight loss [Recent Wt Gain (___ Lbs)] : no recent weight gain [Difficulty Maintaining Sleep] : no difficulty maintaining sleep [Difficulty Initiating Sleep] : no difficulty falling asleep [Lower Extremity Discomfort] : no lower extremity discomfort [de-identified] : 8 episodes of TIA's (speech delay) [Unusual Sleep Behavior] : no unusual sleep behavior [FreeTextEntry1] : 11pm-12am [FreeTextEntry2] : 7am [FreeTextEntry5] : brief to <1 hour [FreeTextEntry3] : brief [FreeTextEntry6] : 7 hours on average [FreeTextEntry4] : 1 to urinate [FreeTextEntry7] : occasional 3pm nap up to 2-hours (refreshing)

## 2020-02-28 NOTE — HISTORY OF PRESENT ILLNESS
[Snoring] : snoring [Daytime Somnolence] : daytime somnolence [ESS: ___] : ESS score [unfilled] [Unintentional Sleep While Inactive] : unintentional sleep while inactive [Awakes Unrefreshed] : awakening unrefreshed [Awakening With Dry Mouth] : awakening with dry mouth [Arises: ___] : arises at [unfilled] [To Bed: ___] : ~he/she~ goes to bed at [unfilled] [Nocturnal Awakenings: ___] : ~he/she~ typically has [unfilled] nocturnal awakenings [Sleep Onset Latency: ___ minutes] : sleep onset latency of [unfilled] minutes reported [WASO: ___] : Wake time after sleep onset is [unfilled] [TST: ___] : Total sleep time is [unfilled] [Daytime Sleep: ___] : daytime sleep: [unfilled] [AHI: ___ per hour] : Apnea-hypopnea index:  [unfilled] per hour [T90%: ___] : T90%: [unfilled]% [Luis desatuation%: ___] : Luis desaturation:  [unfilled]% [CPAP: ___ cmH2O] : CPAP: [unfilled] cmH2O [Date: ___] : the most recent therapeutic polysomnogram was completed [unfilled] [% Days used: ____] : Days used: [unfilled] % [% Days used > 4 hrs: ____] : Days used > 4 hrs: [unfilled] % [Mean nightly usage: ___ hrs] : Mean nightly usage: [unfilled] hrs [Therapy based AHI: ___ /hr] : Therapy based AHI: [unfilled] / hr [FreeTextEntry1] : 69 year old SMITH CHAN with moderate obstructive sleep apnea on CPAP therapy presents for follow-up to Oct/2018 visit.\par \par COMORBIDITIES:  TIA, CAD, HTN, HLD, hypothyroidism, former smoker, anemia.\par \par CHIEF COMPLAINT:  recurrent rhinorrhea and sneezing using CPAP - has not used CPAP for ~ 6 weeks as a result.  \par \par He admits to snoring, daytime sleepiness (ESS 10), unintentional sleep when inactive, nonrestorative sleep, and morning dry mouth.   \par \par BHARATHI \par HPI:  For reported heavy snoring, sleep fragmentation, and hypersomnolence (ESS 17); patient was diagnosed with moderate BHARATHI (AHI 15.2) on home sleep study in Sep/2017.\par \par THERAPY:  He started CPAP therapy in Jun/2018; he tolerates pressure at 9 cmH2O, full-face and nasal masks well.\par \par COMPLIANCE:  Nightly use of CPAP for at least 7 hours until ~ 6 weeks ago. \par BENEFIT:  improvement in sleep noted by wife.  Patient does not note any difference with/without CPAP.\par \par SLEEP:  7-hours on average between 11PM-7AM, with occasional 3pm refreshing nap for up to 2-hours.\par \par INTERIM CHANGES:  switched from Paxil to Prozac ~6 weeks ago.  Dyspnea for ~ one month, being evaluated by cardiologist Dr. Ordoñez.  No weight change. [Witnessed Apneas] : no witnessed sleep apnea [Unintentional Sleep while Active] : no unintentional sleep while active [Frequent Nocturnal Awakening] : no nocturnal awakening [Awakes with Headache] : no headache upon awakening [Recent  Weight Gain] : no recent weight gain [DIS] : no DIS [DMS] : no DMS [Lower Extremity Discomfort] : no lower extremity discomfort in evening or at bedtime [Nocturnal Oxygen] : The patient does not use nocturnal oxygen [Unusual Sleep Behavior] : no unusual sleep behavior

## 2020-03-02 PROBLEM — E03.9 HYPOTHYROIDISM, UNSPECIFIED: Chronic | Status: ACTIVE | Noted: 2017-06-13

## 2020-03-12 ENCOUNTER — APPOINTMENT (OUTPATIENT)
Dept: ELECTROPHYSIOLOGY | Facility: CLINIC | Age: 70
End: 2020-03-12
Payer: MEDICARE

## 2020-03-12 PROCEDURE — 93298 REM INTERROG DEV EVAL SCRMS: CPT

## 2020-03-12 PROCEDURE — G2066: CPT

## 2020-03-18 ENCOUNTER — APPOINTMENT (OUTPATIENT)
Dept: CV DIAGNOSTICS | Facility: HOSPITAL | Age: 70
End: 2020-03-18

## 2020-04-13 ENCOUNTER — APPOINTMENT (OUTPATIENT)
Dept: ELECTROPHYSIOLOGY | Facility: CLINIC | Age: 70
End: 2020-04-13
Payer: COMMERCIAL

## 2020-04-13 PROCEDURE — G2066: CPT

## 2020-04-13 PROCEDURE — 93298 REM INTERROG DEV EVAL SCRMS: CPT

## 2020-04-23 NOTE — ED CDU PROVIDER INITIAL DAY NOTE - GASTROINTESTINAL NEGATIVE STATEMENT, MLM
Quality 226: Preventive Care And Screening: Tobacco Use: Screening And Cessation Intervention: Patient screened for tobacco use and is an ex/non-smoker Quality 47: Advance Care Plan: Advance Care Planning discussed and documented in the medical record; patient did not wish or was not able to name a surrogate decision maker or provide an advance care plan. Quality 431: Preventive Care And Screening: Unhealthy Alcohol Use - Screening: Patient screened for unhealthy alcohol use using a single question and scores less than 2 times per year Quality 111:Pneumonia Vaccination Status For Older Adults: Pneumococcal Vaccination Previously Received Quality 130: Documentation Of Current Medications In The Medical Record: Current Medications Documented Detail Level: Detailed no abdominal pain, no bloating, no constipation, no diarrhea, no nausea and no vomiting.

## 2020-05-06 ENCOUNTER — APPOINTMENT (OUTPATIENT)
Dept: CV DIAGNOSTICS | Facility: HOSPITAL | Age: 70
End: 2020-05-06

## 2020-05-06 ENCOUNTER — OUTPATIENT (OUTPATIENT)
Dept: OUTPATIENT SERVICES | Facility: HOSPITAL | Age: 70
LOS: 1 days | End: 2020-05-06
Payer: MEDICARE

## 2020-05-06 DIAGNOSIS — I25.10 ATHEROSCLEROTIC HEART DISEASE OF NATIVE CORONARY ARTERY WITHOUT ANGINA PECTORIS: ICD-10-CM

## 2020-05-06 PROCEDURE — 78452 HT MUSCLE IMAGE SPECT MULT: CPT

## 2020-05-06 PROCEDURE — 78452 HT MUSCLE IMAGE SPECT MULT: CPT | Mod: 26

## 2020-05-06 PROCEDURE — A9500: CPT

## 2020-05-06 PROCEDURE — 93018 CV STRESS TEST I&R ONLY: CPT

## 2020-05-06 PROCEDURE — 93016 CV STRESS TEST SUPVJ ONLY: CPT

## 2020-05-06 PROCEDURE — 93017 CV STRESS TEST TRACING ONLY: CPT

## 2020-05-13 ENCOUNTER — APPOINTMENT (OUTPATIENT)
Dept: ELECTROPHYSIOLOGY | Facility: CLINIC | Age: 70
End: 2020-05-13
Payer: MEDICARE

## 2020-05-13 PROCEDURE — 93298 REM INTERROG DEV EVAL SCRMS: CPT

## 2020-05-13 PROCEDURE — G2066: CPT

## 2020-06-16 ENCOUNTER — APPOINTMENT (OUTPATIENT)
Dept: ELECTROPHYSIOLOGY | Facility: CLINIC | Age: 70
End: 2020-06-16
Payer: MEDICARE

## 2020-06-16 PROCEDURE — G2066: CPT

## 2020-06-16 PROCEDURE — 93298 REM INTERROG DEV EVAL SCRMS: CPT

## 2020-06-26 NOTE — ED CDU PROVIDER SUBSEQUENT DAY NOTE - HISTORY
Other Courtesy follow up phone call CDU NOTE YASEMIN Sloan: pt resting comfortably, feels well without complaint. pt had come in for difficulty with word finding that lasted about 1-2 mins while at his neurologists office. pt reports that since then he has felt fine, has not had recurrence of episodes.  pt denies dizziness, weakness, numbness, tingling, slurred speech, unsteadiness. pt feels well. NAD most recent VSS. no events on tele. neuro exam- no focal findings, M/S intact x 4, CN II-XII grossly intact.

## 2020-07-21 ENCOUNTER — APPOINTMENT (OUTPATIENT)
Dept: ELECTROPHYSIOLOGY | Facility: CLINIC | Age: 70
End: 2020-07-21
Payer: MEDICARE

## 2020-07-21 PROCEDURE — G2066: CPT

## 2020-07-21 PROCEDURE — 93298 REM INTERROG DEV EVAL SCRMS: CPT

## 2020-07-23 ENCOUNTER — APPOINTMENT (OUTPATIENT)
Dept: NEUROLOGY | Facility: CLINIC | Age: 70
End: 2020-07-23
Payer: MEDICARE

## 2020-07-23 VITALS — TEMPERATURE: 97.6 F

## 2020-07-23 PROCEDURE — 99214 OFFICE O/P EST MOD 30 MIN: CPT

## 2020-08-24 ENCOUNTER — APPOINTMENT (OUTPATIENT)
Dept: ELECTROPHYSIOLOGY | Facility: CLINIC | Age: 70
End: 2020-08-24
Payer: MEDICARE

## 2020-08-24 PROCEDURE — G2066: CPT

## 2020-08-24 PROCEDURE — 93298 REM INTERROG DEV EVAL SCRMS: CPT

## 2020-08-25 ENCOUNTER — APPOINTMENT (OUTPATIENT)
Dept: NEUROLOGY | Facility: CLINIC | Age: 70
End: 2020-08-25
Payer: MEDICARE

## 2020-08-25 VITALS
WEIGHT: 213 LBS | HEIGHT: 75 IN | SYSTOLIC BLOOD PRESSURE: 126 MMHG | HEART RATE: 100 BPM | BODY MASS INDEX: 26.49 KG/M2 | DIASTOLIC BLOOD PRESSURE: 66 MMHG | OXYGEN SATURATION: 100 %

## 2020-08-25 VITALS — TEMPERATURE: 97.5 F

## 2020-08-25 DIAGNOSIS — G43.009 MIGRAINE W/OUT AURA, NOT INTRACTABLE, W/OUT STATUS MIGRAINOSUS: ICD-10-CM

## 2020-08-25 PROCEDURE — 93880 EXTRACRANIAL BILAT STUDY: CPT

## 2020-08-25 PROCEDURE — 99215 OFFICE O/P EST HI 40 MIN: CPT

## 2020-08-25 RX ORDER — LEVOTHYROXINE SODIUM 150 UG/1
150 TABLET ORAL DAILY
Refills: 0 | Status: ACTIVE | COMMUNITY

## 2020-09-09 ENCOUNTER — NON-APPOINTMENT (OUTPATIENT)
Age: 70
End: 2020-09-09

## 2020-09-09 ENCOUNTER — APPOINTMENT (OUTPATIENT)
Dept: ELECTROPHYSIOLOGY | Facility: CLINIC | Age: 70
End: 2020-09-09
Payer: MEDICARE

## 2020-09-09 VITALS
HEIGHT: 75 IN | WEIGHT: 213 LBS | BODY MASS INDEX: 26.49 KG/M2 | SYSTOLIC BLOOD PRESSURE: 135 MMHG | HEART RATE: 78 BPM | DIASTOLIC BLOOD PRESSURE: 73 MMHG | OXYGEN SATURATION: 100 %

## 2020-09-09 PROCEDURE — 93291 INTERROG DEV EVAL SCRMS IP: CPT

## 2020-09-25 ENCOUNTER — APPOINTMENT (OUTPATIENT)
Dept: NEUROLOGY | Facility: CLINIC | Age: 70
End: 2020-09-25
Payer: MEDICARE

## 2020-09-25 VITALS
SYSTOLIC BLOOD PRESSURE: 109 MMHG | BODY MASS INDEX: 26.73 KG/M2 | DIASTOLIC BLOOD PRESSURE: 62 MMHG | HEART RATE: 57 BPM | WEIGHT: 215 LBS | HEIGHT: 75 IN

## 2020-09-25 VITALS — TEMPERATURE: 97.8 F

## 2020-09-25 DIAGNOSIS — R00.1 BRADYCARDIA, UNSPECIFIED: ICD-10-CM

## 2020-09-25 PROCEDURE — 99215 OFFICE O/P EST HI 40 MIN: CPT | Mod: 25

## 2020-09-25 PROCEDURE — 96116 NUBHVL XM PHYS/QHP 1ST HR: CPT | Mod: 59

## 2020-10-09 ENCOUNTER — APPOINTMENT (OUTPATIENT)
Dept: ELECTROPHYSIOLOGY | Facility: CLINIC | Age: 70
End: 2020-10-09
Payer: MEDICARE

## 2020-10-09 PROCEDURE — G2066: CPT

## 2020-10-09 PROCEDURE — 93298 REM INTERROG DEV EVAL SCRMS: CPT

## 2020-11-13 ENCOUNTER — APPOINTMENT (OUTPATIENT)
Dept: ELECTROPHYSIOLOGY | Facility: CLINIC | Age: 70
End: 2020-11-13
Payer: MEDICARE

## 2020-11-13 PROCEDURE — G2066: CPT

## 2020-11-13 PROCEDURE — 93298 REM INTERROG DEV EVAL SCRMS: CPT

## 2020-12-10 ENCOUNTER — NON-APPOINTMENT (OUTPATIENT)
Age: 70
End: 2020-12-10

## 2020-12-18 ENCOUNTER — APPOINTMENT (OUTPATIENT)
Dept: ELECTROPHYSIOLOGY | Facility: CLINIC | Age: 70
End: 2020-12-18
Payer: MEDICARE

## 2020-12-18 PROCEDURE — G2066: CPT

## 2020-12-18 PROCEDURE — 93298 REM INTERROG DEV EVAL SCRMS: CPT

## 2021-02-02 ENCOUNTER — RX RENEWAL (OUTPATIENT)
Age: 71
End: 2021-02-02

## 2021-02-04 ENCOUNTER — NON-APPOINTMENT (OUTPATIENT)
Age: 71
End: 2021-02-04

## 2021-02-08 ENCOUNTER — APPOINTMENT (OUTPATIENT)
Dept: ELECTROPHYSIOLOGY | Facility: CLINIC | Age: 71
End: 2021-02-08

## 2021-02-10 ENCOUNTER — APPOINTMENT (OUTPATIENT)
Dept: ELECTROPHYSIOLOGY | Facility: CLINIC | Age: 71
End: 2021-02-10

## 2021-02-13 DIAGNOSIS — Z01.818 ENCOUNTER FOR OTHER PREPROCEDURAL EXAMINATION: ICD-10-CM

## 2021-02-16 ENCOUNTER — APPOINTMENT (OUTPATIENT)
Dept: DISASTER EMERGENCY | Facility: CLINIC | Age: 71
End: 2021-02-16

## 2021-02-17 LAB — SARS-COV-2 N GENE NPH QL NAA+PROBE: NOT DETECTED

## 2021-02-19 ENCOUNTER — OUTPATIENT (OUTPATIENT)
Dept: OUTPATIENT SERVICES | Facility: HOSPITAL | Age: 71
LOS: 1 days | End: 2021-02-19
Payer: MEDICARE

## 2021-02-19 VITALS
OXYGEN SATURATION: 97 % | TEMPERATURE: 98 F | HEIGHT: 75 IN | WEIGHT: 225.09 LBS | SYSTOLIC BLOOD PRESSURE: 119 MMHG | DIASTOLIC BLOOD PRESSURE: 61 MMHG | HEART RATE: 60 BPM | RESPIRATION RATE: 18 BRPM

## 2021-02-19 VITALS — SYSTOLIC BLOOD PRESSURE: 118 MMHG | DIASTOLIC BLOOD PRESSURE: 75 MMHG | HEART RATE: 55 BPM

## 2021-02-19 DIAGNOSIS — I63.9 CEREBRAL INFARCTION, UNSPECIFIED: ICD-10-CM

## 2021-02-19 PROCEDURE — 33286 RMVL SUBQ CAR RHYTHM MNTR: CPT

## 2021-02-19 RX ORDER — LEVOTHYROXINE SODIUM 125 MCG
1 TABLET ORAL
Qty: 0 | Refills: 0 | DISCHARGE

## 2021-02-19 RX ORDER — LEVOTHYROXINE SODIUM 125 MCG
0 TABLET ORAL
Qty: 0 | Refills: 0 | DISCHARGE

## 2021-02-19 NOTE — ASU DISCHARGE PLAN (ADULT/PEDIATRIC) - ASU DC SPECIAL INSTRUCTIONSFT
WOUND CARE:  Do NOT scrub, rub, or pick at your incision site  the glue will wear off in 5-7 days  do not get your incision wet for 3 days   AFTER 3 days you may shower:   - use mild soap and gentle warm stream, pat dry with towel  DO NOT apply lotions, powders, ointment, or perfumes to incision  wear loose clothing around incision for 7 days  f/u appointment as instructed     ACTIVITY:   resume normal activities    Follow heart healthy diet recommended by your doctor, , if you smoke STOP SMOKING ( may call [phone] for center of tobacco control if you need assistance)     ***CALL YOUR DOCTOR***  if you experience: fever, chills, body aches, or severe pain, swelling, redness, heat or yellow discharge at incision site  If you are unable to reach your doctor, you may contact Cardiology Office at [facility] at [phone]

## 2021-02-19 NOTE — ASU DISCHARGE PLAN (ADULT/PEDIATRIC) - CARE PROVIDER_API CALL
Arya Ordoñez)  Cardiovascular Disease; Interventional Cardiology  [address] [place] [address]  Phone: [phone]  Fax: [phone]  Follow Up Time:    Pawan Carvajal)  Cardiac Electrophysiology; Cardiology  [address] [place] [address]  Phone: [phone]  Fax: [phone]  Scheduled Appointment: 02/26/2021 10:40 AM

## 2021-02-20 ENCOUNTER — NON-APPOINTMENT (OUTPATIENT)
Age: 71
End: 2021-02-20

## 2021-02-25 ENCOUNTER — APPOINTMENT (OUTPATIENT)
Dept: NEUROLOGY | Facility: CLINIC | Age: 71
End: 2021-02-25

## 2021-02-26 ENCOUNTER — APPOINTMENT (OUTPATIENT)
Dept: ELECTROPHYSIOLOGY | Facility: CLINIC | Age: 71
End: 2021-02-26
Payer: MEDICARE

## 2021-02-26 ENCOUNTER — NON-APPOINTMENT (OUTPATIENT)
Age: 71
End: 2021-02-26

## 2021-02-26 VITALS — HEART RATE: 64 BPM | DIASTOLIC BLOOD PRESSURE: 67 MMHG | SYSTOLIC BLOOD PRESSURE: 132 MMHG

## 2021-02-26 PROCEDURE — 99211 OFF/OP EST MAY X REQ PHY/QHP: CPT

## 2021-02-26 NOTE — PHYSICAL EXAM
[General Appearance - Well Developed] : well developed [Normal Appearance] : normal appearance [General Appearance - Well Nourished] : well nourished [FreeTextEntry1] : ILR explant site: Well healign area. No erythema, drainage or swelling. Dermabond intact over site

## 2021-02-26 NOTE — HISTORY OF PRESENT ILLNESS
[FreeTextEntry1] : This is a 68 y/o male who has a history hypertension, hyperlipidemia, sleep apnea on CPAP, status post  Medtronic loop recorder implanted 05/15/2017 for TIA for arrhythmia monitoring. He has had no atrial fibrillation episodes detected on ILR since implantation. His device reached end of life and was removed 2/19/2021. He presents today for wound check post explant. He denies chest pain, SOB, palpitations, lightheadedness or dizziness. he states his site is unremarkable and healing well. \par ECG shows sinus rhythm today.\par

## 2021-02-26 NOTE — ASSESSMENT
[FreeTextEntry1] : Patient is status post ILR explant. His ILR explant site is healing well. Patient to follow up with his cardiologist/PCP for further care.

## 2021-03-08 ENCOUNTER — APPOINTMENT (OUTPATIENT)
Dept: NEUROLOGY | Facility: CLINIC | Age: 71
End: 2021-03-08
Payer: MEDICARE

## 2021-03-08 VITALS
BODY MASS INDEX: 27.98 KG/M2 | HEART RATE: 52 BPM | SYSTOLIC BLOOD PRESSURE: 146 MMHG | WEIGHT: 225 LBS | HEIGHT: 75 IN | DIASTOLIC BLOOD PRESSURE: 81 MMHG

## 2021-03-08 PROCEDURE — 99213 OFFICE O/P EST LOW 20 MIN: CPT

## 2021-07-27 ENCOUNTER — APPOINTMENT (OUTPATIENT)
Dept: PULMONOLOGY | Facility: CLINIC | Age: 71
End: 2021-07-27
Payer: MEDICARE

## 2021-07-27 VITALS
HEIGHT: 75 IN | DIASTOLIC BLOOD PRESSURE: 58 MMHG | WEIGHT: 215 LBS | BODY MASS INDEX: 26.73 KG/M2 | HEART RATE: 48 BPM | TEMPERATURE: 98 F | RESPIRATION RATE: 15 BRPM | SYSTOLIC BLOOD PRESSURE: 119 MMHG

## 2021-07-27 PROCEDURE — 99214 OFFICE O/P EST MOD 30 MIN: CPT

## 2021-07-27 NOTE — PHYSICAL EXAM
[General Appearance - In No Acute Distress] : no acute distress [Normal Conjunctiva] : the conjunctiva exhibited no abnormalities [IV] : IV [Heart Rate And Rhythm] : heart rate was normal and rhythm regular [Heart Sounds] : normal S1 and S2 [] : no respiratory distress [Respiration, Rhythm And Depth] : normal respiratory rhythm and effort [Auscultation Breath Sounds / Voice Sounds] : lungs were clear to auscultation bilaterally [Abnormal Walk] : normal gait [Involuntary Movements] : no involuntary movements were seen [Nail Clubbing] : no clubbing of the fingernails [Non-Pitting] : non-pitting [Skin Color & Pigmentation] : normal skin color and pigmentation [No Focal Deficits] : no focal deficits [Oriented To Time, Place, And Person] : oriented to person, place, and time

## 2021-07-27 NOTE — ASSESSMENT
[FreeTextEntry1] : 71yo M with history of heavy snoring, sleep fragmentation, excessive daytime somnolence -- ESS 17 -- presenting for management of sleep disordered breathing. He has experienced these symptoms for many years but they have worsened in severity. He has also had some expressive aphasia, concerning for TIAs and was referred for evaluation of SDB that might contribute to increased risk for this.\par \par He has been on CPAP with some improvement in his symptoms. However, he is concerned about the recent recall. \par \par Data from machine was reviewed and is as follows:\par DME -- [facility]\par Device -- Dreamstation pro\par Percent days with device usage in last 30 days -- 60%\par Average usage (days used) -- 5h 15min\par Average treatment related SHIRAZ -- 1.9/h\par Mask leakage -- excessive\par Pressure -- 9 cm H20 EPR 2\par \par Discussed the recent [employer] voluntary recall for Continuous and Non-Continuous Ventilators (certain CPAP, BiLevel PAP and Ventilator Devices) due to two issues related to the polyester-based polyurethane (PE-PUR) sound abatement foam used in these devices. The potential harm of inhalation or ingestion of the foam particles was discussed in detail with the patient. Symptoms such as headache, upper airway irritation, cough, chest pressure and sinus infection were discussed at length with the patient. \par Given that the patient has a history of mild sleep disordered breathing, the decision was made to halt therapy until they can have their current device repaired or fully replaced.\par The dangers of drowsy driving were discussed with the patient. The patient was warned to avoid drowsy driving. The patient will follow-up after he/she has heard from the [employer] company.\par \par Will order new CPAP machine at current settings so that he can resume PAP therapy for his mild to moderate BHARATHI\par I explained the rationale for treatment of BHARATHI -- to improve quality of life, daytime function and to decrease the cardiometabolic and other medical risks that are associated with untreated BHARATHI. The patient verbalized understanding.

## 2021-07-27 NOTE — HISTORY OF PRESENT ILLNESS
[FreeTextEntry1] : 69yo M with history of heavy snoring, sleep fragmentation, excessive daytime somnolence -- ESS 17 -- presenting for management of sleep disordered breathing. He has experienced these symptoms for many years but they have worsened in severity. He has also had some expressive aphasia, concerning for TIAs and was referred for evaluation of SDB that might contribute to increased risk for this.\par \par He has been on CPAP with some improvement in his symptoms. However, he is concerned about the recent recall. \par \par Data from machine was reviewed and is as follows:\par DME -- [facility]\par Device -- Dreamstation pro\par Percent days with device usage in last 30 days -- 60%\par Average usage (days used) -- 5h 15min\par Average treatment related SHIRAZ -- 1.9/h\par Mask leakage -- excessive\par Pressure -- 9 cm H20 EPR 2\par

## 2021-07-27 NOTE — REVIEW OF SYSTEMS
[A.M. Dry Mouth] : a.m. dry mouth [Negative] : Psychiatric [EDS: ESS=____] : no daytime somnolence [Fatigue] : no fatigue [Snoring] : no snoring [Witnessed Apneas] : no witnessed apnea [Difficulty Initiating Sleep] : no difficulty falling asleep [Difficulty Maintaining Sleep] : no difficulty maintaining sleep [Lower Extremity Discomfort] : no lower extremity discomfort [Unusual Sleep Behavior] : no unusual sleep behavior [Hypersomnolence] : not sleeping much more than usual [Cataplexy] :  no cataplexy

## 2021-08-17 ENCOUNTER — NON-APPOINTMENT (OUTPATIENT)
Age: 71
End: 2021-08-17

## 2021-09-21 ENCOUNTER — NON-APPOINTMENT (OUTPATIENT)
Age: 71
End: 2021-09-21

## 2021-09-22 ENCOUNTER — NON-APPOINTMENT (OUTPATIENT)
Age: 71
End: 2021-09-22

## 2021-09-24 ENCOUNTER — APPOINTMENT (OUTPATIENT)
Dept: NEUROLOGY | Facility: CLINIC | Age: 71
End: 2021-09-24
Payer: MEDICARE

## 2021-09-24 VITALS
HEART RATE: 51 BPM | BODY MASS INDEX: 26.36 KG/M2 | HEIGHT: 75 IN | SYSTOLIC BLOOD PRESSURE: 101 MMHG | WEIGHT: 212 LBS | DIASTOLIC BLOOD PRESSURE: 56 MMHG

## 2021-09-24 DIAGNOSIS — R47.89 OTHER SPEECH DISTURBANCES: ICD-10-CM

## 2021-09-24 DIAGNOSIS — Z78.9 OTHER SPECIFIED HEALTH STATUS: ICD-10-CM

## 2021-09-24 PROCEDURE — 99215 OFFICE O/P EST HI 40 MIN: CPT | Mod: 25

## 2021-09-24 PROCEDURE — 96116 NUBHVL XM PHYS/QHP 1ST HR: CPT | Mod: 59

## 2021-09-24 RX ORDER — GLUC/MSM/COLGN2/HYAL/ANTIARTH3 375-375-20
TABLET ORAL
Refills: 0 | Status: ACTIVE | COMMUNITY

## 2021-09-24 RX ORDER — PRAVASTATIN SODIUM 20 MG/1
20 TABLET ORAL
Qty: 90 | Refills: 3 | Status: COMPLETED | COMMUNITY
Start: 2017-05-02 | End: 2021-09-24

## 2021-10-04 ENCOUNTER — APPOINTMENT (OUTPATIENT)
Dept: NEUROLOGY | Facility: CLINIC | Age: 71
End: 2021-10-04
Payer: MEDICARE

## 2021-10-04 VITALS
SYSTOLIC BLOOD PRESSURE: 121 MMHG | HEIGHT: 75 IN | HEART RATE: 49 BPM | DIASTOLIC BLOOD PRESSURE: 64 MMHG | WEIGHT: 214 LBS | BODY MASS INDEX: 26.61 KG/M2

## 2021-10-04 DIAGNOSIS — G45.1 CAROTID ARTERY SYNDROME (HEMISPHERIC): ICD-10-CM

## 2021-10-04 DIAGNOSIS — G43.809 OTHER MIGRAINE, NOT INTRACTABLE, W/OUT STATUS MIGRAINOSUS: ICD-10-CM

## 2021-10-04 PROCEDURE — 93880 EXTRACRANIAL BILAT STUDY: CPT

## 2021-10-04 PROCEDURE — 99214 OFFICE O/P EST MOD 30 MIN: CPT

## 2021-10-25 ENCOUNTER — APPOINTMENT (OUTPATIENT)
Dept: NEUROLOGY | Facility: CLINIC | Age: 71
End: 2021-10-25
Payer: MEDICARE

## 2021-10-25 PROCEDURE — 93886 INTRACRANIAL COMPLETE STUDY: CPT

## 2021-10-25 PROCEDURE — 93892 TCD EMBOLI DETECT W/O INJ: CPT

## 2022-01-01 NOTE — REASON FOR VISIT
Infant remains swaddled in an isolette on ISC. VSS, on RA. No a/b this shift. Tolerating q3 gavage feeds. Volume and fortification increased today. No emesis noted. Mother present at the bedside to breastfeed @1100 and 1400. Infant was able to latch but suck was inconsistent. Protected breastfeeding window will end on 10/20 @ 1100. Mom held skin-to-skin for 60 minutes. Voiding/stooling appropriately. Infant continues to have intermittent PVS's on the monitor. MD/NNP aware. Parents present at the bedside and updated on the POC by RN; all questions answered  and encouraged. Verbalized understanding.    [Consultation] : a consultation visit [Other: _____] : [unfilled] [FreeTextEntry1] : evaluate for FTD/PPA

## 2022-04-12 ENCOUNTER — APPOINTMENT (OUTPATIENT)
Dept: NEUROLOGY | Facility: CLINIC | Age: 72
End: 2022-04-12
Payer: MEDICARE

## 2022-04-12 VITALS
DIASTOLIC BLOOD PRESSURE: 58 MMHG | SYSTOLIC BLOOD PRESSURE: 134 MMHG | BODY MASS INDEX: 25.86 KG/M2 | WEIGHT: 208 LBS | HEART RATE: 61 BPM | HEIGHT: 75 IN

## 2022-04-12 PROCEDURE — 99213 OFFICE O/P EST LOW 20 MIN: CPT

## 2022-04-20 ENCOUNTER — RX RENEWAL (OUTPATIENT)
Age: 72
End: 2022-04-20

## 2022-08-29 ENCOUNTER — APPOINTMENT (OUTPATIENT)
Dept: CARDIOLOGY | Facility: CLINIC | Age: 72
End: 2022-08-29

## 2022-08-29 VITALS
WEIGHT: 210 LBS | OXYGEN SATURATION: 98 % | HEART RATE: 55 BPM | BODY MASS INDEX: 26.11 KG/M2 | DIASTOLIC BLOOD PRESSURE: 78 MMHG | HEIGHT: 75 IN | SYSTOLIC BLOOD PRESSURE: 150 MMHG

## 2022-08-29 PROCEDURE — 99215 OFFICE O/P EST HI 40 MIN: CPT

## 2022-08-29 PROCEDURE — 93000 ELECTROCARDIOGRAM COMPLETE: CPT

## 2022-08-29 RX ORDER — MELOXICAM 15 MG/1
15 TABLET ORAL
Qty: 30 | Refills: 0 | Status: DISCONTINUED | COMMUNITY
Start: 2022-08-22

## 2022-09-09 ENCOUNTER — APPOINTMENT (OUTPATIENT)
Dept: CT IMAGING | Facility: CLINIC | Age: 72
End: 2022-09-09

## 2022-09-09 ENCOUNTER — OUTPATIENT (OUTPATIENT)
Dept: OUTPATIENT SERVICES | Facility: HOSPITAL | Age: 72
LOS: 1 days | End: 2022-09-09
Payer: MEDICARE

## 2022-09-09 DIAGNOSIS — I25.10 ATHEROSCLEROTIC HEART DISEASE OF NATIVE CORONARY ARTERY WITHOUT ANGINA PECTORIS: ICD-10-CM

## 2022-09-09 PROCEDURE — 75574 CT ANGIO HRT W/3D IMAGE: CPT | Mod: 26,MH

## 2022-09-09 PROCEDURE — 75574 CT ANGIO HRT W/3D IMAGE: CPT

## 2022-09-19 ENCOUNTER — OUTPATIENT (OUTPATIENT)
Dept: OUTPATIENT SERVICES | Facility: HOSPITAL | Age: 72
LOS: 1 days | End: 2022-09-19
Payer: MEDICARE

## 2022-09-19 DIAGNOSIS — Z11.52 ENCOUNTER FOR SCREENING FOR COVID-19: ICD-10-CM

## 2022-09-19 LAB — SARS-COV-2 RNA SPEC QL NAA+PROBE: SIGNIFICANT CHANGE UP

## 2022-09-19 PROCEDURE — C9803: CPT

## 2022-09-19 PROCEDURE — U0003: CPT

## 2022-09-19 PROCEDURE — U0005: CPT

## 2022-09-20 ENCOUNTER — NON-APPOINTMENT (OUTPATIENT)
Age: 72
End: 2022-09-20

## 2022-09-22 ENCOUNTER — OUTPATIENT (OUTPATIENT)
Dept: OUTPATIENT SERVICES | Facility: HOSPITAL | Age: 72
LOS: 1 days | End: 2022-09-22
Payer: MEDICARE

## 2022-09-22 ENCOUNTER — TRANSCRIPTION ENCOUNTER (OUTPATIENT)
Age: 72
End: 2022-09-22

## 2022-09-22 VITALS
SYSTOLIC BLOOD PRESSURE: 136 MMHG | WEIGHT: 207.9 LBS | HEIGHT: 75 IN | RESPIRATION RATE: 18 BRPM | HEART RATE: 60 BPM | TEMPERATURE: 98 F | OXYGEN SATURATION: 98 % | DIASTOLIC BLOOD PRESSURE: 77 MMHG

## 2022-09-22 VITALS
RESPIRATION RATE: 12 BRPM | SYSTOLIC BLOOD PRESSURE: 142 MMHG | OXYGEN SATURATION: 96 % | DIASTOLIC BLOOD PRESSURE: 67 MMHG | HEART RATE: 67 BPM

## 2022-09-22 DIAGNOSIS — R94.01 ABNORMAL ELECTROENCEPHALOGRAM [EEG]: ICD-10-CM

## 2022-09-22 LAB
ANION GAP SERPL CALC-SCNC: 6 MMOL/L — SIGNIFICANT CHANGE UP (ref 5–17)
BUN SERPL-MCNC: 14 MG/DL — SIGNIFICANT CHANGE UP (ref 7–23)
CALCIUM SERPL-MCNC: 9.3 MG/DL — SIGNIFICANT CHANGE UP (ref 8.4–10.5)
CHLORIDE SERPL-SCNC: 107 MMOL/L — SIGNIFICANT CHANGE UP (ref 96–108)
CO2 SERPL-SCNC: 29 MMOL/L — SIGNIFICANT CHANGE UP (ref 22–31)
CREAT SERPL-MCNC: 0.86 MG/DL — SIGNIFICANT CHANGE UP (ref 0.5–1.3)
EGFR: 93 ML/MIN/1.73M2 — SIGNIFICANT CHANGE UP
GLUCOSE SERPL-MCNC: 101 MG/DL — HIGH (ref 70–99)
HCT VFR BLD CALC: 36.8 % — LOW (ref 39–50)
HGB BLD-MCNC: 12.1 G/DL — LOW (ref 13–17)
MCHC RBC-ENTMCNC: 30.8 PG — SIGNIFICANT CHANGE UP (ref 27–34)
MCHC RBC-ENTMCNC: 32.9 GM/DL — SIGNIFICANT CHANGE UP (ref 32–36)
MCV RBC AUTO: 93.6 FL — SIGNIFICANT CHANGE UP (ref 80–100)
NRBC # BLD: 0 /100 WBCS — SIGNIFICANT CHANGE UP (ref 0–0)
PLATELET # BLD AUTO: 150 K/UL — SIGNIFICANT CHANGE UP (ref 150–400)
POTASSIUM SERPL-MCNC: 4.4 MMOL/L — SIGNIFICANT CHANGE UP (ref 3.5–5.3)
POTASSIUM SERPL-SCNC: 4.4 MMOL/L — SIGNIFICANT CHANGE UP (ref 3.5–5.3)
RBC # BLD: 3.93 M/UL — LOW (ref 4.2–5.8)
RBC # FLD: 12.2 % — SIGNIFICANT CHANGE UP (ref 10.3–14.5)
SODIUM SERPL-SCNC: 142 MMOL/L — SIGNIFICANT CHANGE UP (ref 135–145)
WBC # BLD: 5.2 K/UL — SIGNIFICANT CHANGE UP (ref 3.8–10.5)
WBC # FLD AUTO: 5.2 K/UL — SIGNIFICANT CHANGE UP (ref 3.8–10.5)

## 2022-09-22 PROCEDURE — 93005 ELECTROCARDIOGRAM TRACING: CPT

## 2022-09-22 PROCEDURE — 93454 CORONARY ARTERY ANGIO S&I: CPT | Mod: 26,59

## 2022-09-22 PROCEDURE — 93010 ELECTROCARDIOGRAM REPORT: CPT | Mod: 77

## 2022-09-22 PROCEDURE — C1874: CPT

## 2022-09-22 PROCEDURE — 93454 CORONARY ARTERY ANGIO S&I: CPT | Mod: 59

## 2022-09-22 PROCEDURE — C1725: CPT

## 2022-09-22 PROCEDURE — 85027 COMPLETE CBC AUTOMATED: CPT

## 2022-09-22 PROCEDURE — 93010 ELECTROCARDIOGRAM REPORT: CPT

## 2022-09-22 PROCEDURE — 99152 MOD SED SAME PHYS/QHP 5/>YRS: CPT

## 2022-09-22 PROCEDURE — C9600: CPT | Mod: LD

## 2022-09-22 PROCEDURE — 80048 BASIC METABOLIC PNL TOTAL CA: CPT

## 2022-09-22 PROCEDURE — 92928 PRQ TCAT PLMT NTRAC ST 1 LES: CPT | Mod: LD

## 2022-09-22 PROCEDURE — C1769: CPT

## 2022-09-22 PROCEDURE — C1894: CPT

## 2022-09-22 PROCEDURE — C1887: CPT

## 2022-09-22 RX ORDER — FLUOXETINE HCL 10 MG
40 CAPSULE ORAL DAILY
Refills: 0 | Status: DISCONTINUED | OUTPATIENT
Start: 2022-09-22 | End: 2022-10-06

## 2022-09-22 RX ORDER — SODIUM CHLORIDE 9 MG/ML
250 INJECTION INTRAMUSCULAR; INTRAVENOUS; SUBCUTANEOUS ONCE
Refills: 0 | Status: COMPLETED | OUTPATIENT
Start: 2022-09-22 | End: 2022-09-22

## 2022-09-22 RX ORDER — ASPIRIN AND DIPYRIDAMOLE 25; 200 MG/1; MG/1
1 CAPSULE, EXTENDED RELEASE ORAL
Refills: 0 | Status: DISCONTINUED | OUTPATIENT
Start: 2022-09-22 | End: 2022-09-22

## 2022-09-22 RX ORDER — SODIUM CHLORIDE 9 MG/ML
1000 INJECTION INTRAMUSCULAR; INTRAVENOUS; SUBCUTANEOUS
Refills: 0 | Status: DISCONTINUED | OUTPATIENT
Start: 2022-09-22 | End: 2022-10-06

## 2022-09-22 RX ORDER — ASPIRIN/CALCIUM CARB/MAGNESIUM 324 MG
81 TABLET ORAL DAILY
Refills: 0 | Status: DISCONTINUED | OUTPATIENT
Start: 2022-09-23 | End: 2022-10-06

## 2022-09-22 RX ORDER — ASPIRIN AND DIPYRIDAMOLE 25; 200 MG/1; MG/1
1 CAPSULE, EXTENDED RELEASE ORAL
Qty: 0 | Refills: 0 | DISCHARGE

## 2022-09-22 RX ORDER — SODIUM CHLORIDE 9 MG/ML
1000 INJECTION INTRAMUSCULAR; INTRAVENOUS; SUBCUTANEOUS
Refills: 0 | Status: COMPLETED | OUTPATIENT
Start: 2022-09-22 | End: 2022-09-22

## 2022-09-22 RX ORDER — ASPIRIN/CALCIUM CARB/MAGNESIUM 324 MG
1 TABLET ORAL
Qty: 90 | Refills: 3
Start: 2022-09-22 | End: 2023-09-16

## 2022-09-22 RX ORDER — CLOPIDOGREL BISULFATE 75 MG/1
1 TABLET, FILM COATED ORAL
Qty: 90 | Refills: 3
Start: 2022-09-22 | End: 2023-09-16

## 2022-09-22 RX ORDER — CLOPIDOGREL BISULFATE 75 MG/1
75 TABLET, FILM COATED ORAL DAILY
Refills: 0 | Status: DISCONTINUED | OUTPATIENT
Start: 2022-09-23 | End: 2022-10-06

## 2022-09-22 RX ORDER — PANTOPRAZOLE SODIUM 20 MG/1
40 TABLET, DELAYED RELEASE ORAL
Refills: 0 | Status: DISCONTINUED | OUTPATIENT
Start: 2022-09-22 | End: 2022-10-06

## 2022-09-22 RX ORDER — ATORVASTATIN CALCIUM 80 MG/1
40 TABLET, FILM COATED ORAL AT BEDTIME
Refills: 0 | Status: DISCONTINUED | OUTPATIENT
Start: 2022-09-22 | End: 2022-10-06

## 2022-09-22 RX ORDER — LEVOTHYROXINE SODIUM 125 MCG
150 TABLET ORAL DAILY
Refills: 0 | Status: DISCONTINUED | OUTPATIENT
Start: 2022-09-23 | End: 2022-10-06

## 2022-09-22 RX ADMIN — SODIUM CHLORIDE 750 MILLILITER(S): 9 INJECTION INTRAMUSCULAR; INTRAVENOUS; SUBCUTANEOUS at 10:13

## 2022-09-22 RX ADMIN — SODIUM CHLORIDE 75 MILLILITER(S): 9 INJECTION INTRAMUSCULAR; INTRAVENOUS; SUBCUTANEOUS at 14:48

## 2022-09-22 NOTE — ASU PATIENT PROFILE, ADULT - FALL HARM RISK - UNIVERSAL INTERVENTIONS
Bed in lowest position, wheels locked, appropriate side rails in place/Call bell, personal items and telephone in reach/Instruct patient to call for assistance before getting out of bed or chair/Non-slip footwear when patient is out of bed/[place] to call system/Physically safe environment - no spills, clutter or unnecessary equipment/Purposeful Proactive Rounding/Room/bathroom lighting operational, light cord in reach

## 2022-09-22 NOTE — ASU DISCHARGE PLAN (ADULT/PEDIATRIC) - CARE PROVIDER_API CALL
Arya Ordoñez)  Cardiovascular Disease; Interventional Cardiology  [address] [place] [address]  Phone: [phone]  Fax: [phone]  Follow Up Time: 2 weeks

## 2022-09-22 NOTE — H&P CARDIOLOGY - NSICDXPASTMEDICALHX_GEN_ALL_CORE_FT
PAST MEDICAL HISTORY:  Depressed     Drug abuse remote cocaine    ETOH abuse     Former smoker     Gastroesophageal reflux disease     High cholesterol     Hypothyroid     Hypothyroid     TIA (transient ischemic attack)

## 2022-09-22 NOTE — H&P CARDIOLOGY - NSICDXPASTSURGICALHX_GEN_ALL_CORE_FT
PAST SURGICAL HISTORY:  No significant past surgical history     No significant past surgical history      PAST SURGICAL HISTORY:  No significant past surgical history

## 2022-09-22 NOTE — ASU DISCHARGE PLAN (ADULT/PEDIATRIC) - PLEASE INDICATE TEMPERATURE IN FAHRENHEIT OR CELSIUS
[facility] -- Kam Craig, Magali Worrell Pannella, Patel, Savella  Office # [phone]      Follow Up:  AF, CAD s/p Colon resection    Subjective/Observations: Pt seen and examined.  Sitting in chair eating lunch.  NAD feeling good.  No reports of cp, sob or palpitations.  No signs of orthopnea or PND.        REVIEW OF SYSTEMS: All other review of systems is negative unless indicated above    PAST MEDICAL & SURGICAL HISTORY:  Xerophthalmia  Anemia  Constipation  Gastrointestinal hemorrhage, unspecified gastritis, unspecified gastrointestinal hemorrhage type  Gastritis  Atrial fibrillation, unspecified  Hyperlipidemia  Gastroesophageal reflux disease without esophagitis  Hypothyroidism  Obesity  Dyslipidemia  Hypertension  CAD (coronary artery disease): S/P CABG 1999  History of colon polyps: removal 2014  H/O abdominal hysterectomy  Rectal mass: removal in 6/2015  S/P CABG (coronary artery bypass graft): in 1999      MEDICATIONS  (STANDING):  ascorbic acid 500 milliGRAM(s) Oral daily  aspirin enteric coated 81 milliGRAM(s) Oral daily  digoxin     Tablet 0.125 milliGRAM(s) Oral daily  diltiazem    milliGRAM(s) Oral daily  folic acid 1 milliGRAM(s) Oral daily  levothyroxine 100 MICROGram(s) Oral daily  metoprolol tartrate 25 milliGRAM(s) Oral two times a day  multivitamin 1 Tablet(s) Oral daily  pantoprazole    Tablet 40 milliGRAM(s) Oral before breakfast  rivaroxaban 15 milliGRAM(s) Oral every 24 hours  simvastatin 20 milliGRAM(s) Oral at bedtime    MEDICATIONS  (PRN):  acetaminophen   Tablet. 650 milliGRAM(s) Oral every 6 hours PRN Mild Pain (1 - 3)  oxyCODONE    IR 5 milliGRAM(s) Oral four times a day PRN Moderate Pain (4 - 6)  traMADol 25 milliGRAM(s) Oral four times a day PRN Mild Pain (1 - 3)      Allergies    amoxicillin (Unknown)    Intolerances            Vital Signs Last 24 Hrs  T(C): 36.6 (17 Jun 2018 11:51), Max: 36.9 (16 Jun 2018 23:07)  T(F): 97.9 (17 Jun 2018 11:51), Max: 98.4 (16 Jun 2018 23:07)  HR: 56 (17 Jun 2018 11:51) (55 - 72)  BP: 155/53 (17 Jun 2018 11:51) (105/69 - 176/68)  BP(mean): --  RR: 15 (17 Jun 2018 11:51) (15 - 18)  SpO2: 98% (17 Jun 2018 11:51) (94% - 100%)    I&O's Summary        PHYSICAL EXAM:  TELE: Off tele  Constitutional: NAD, awake and alert, well-developed  HEENT: Moist Mucous Membranes, Anicteric  Pulmonary: Non-labored, breath sounds are diminished in bases with rt side basal rales  Cardiovascular: Irregular, S1 and S2, No murmurs, rubs, gallops or clicks  Gastrointestinal: Bowel Sounds present, soft, nontender.  Obese abdomen  Lymph: No peripheral edema. No lymphadenopathy.  Skin: No visible rashes or ulcers.  Psych:  Mood & affect appropriate    LABS: All Labs Reviewed:                        8.5    x     )-----------( x        ( 17 Jun 2018 10:13 )             27.7                         8.8    9.06  )-----------( 217      ( 16 Jun 2018 11:47 )             29.7                         8.8    10.92 )-----------( 189      ( 15 Carmelo 2018 10:58 )             28.8     15 Carmelo 2018 10:58    141    |  105    |  8      ----------------------------<  140    3.9     |  29     |  0.90     Ca    8.2        15 Carmelo 2018 10:58  < from: Transthoracic Echocardiogram (04.03.18 @ 12:57) >  Patient name: RON ABREU  YOB: 1925   Age: 89 (F)   MR#: [id number]  Study Date: 4/3/2018  Location: O/PSonographer: Sangita Ramírez [facility]  Study quality: Technically difficult  Referring Physician: DARIA MINAYA MD  Blood Pressure: 183/77 mmHg  Height: 140 cm  Weight: 58 kg  BSA: 1.5 m2  ------------------------------------------------------------------------  PROCEDURE: Transthoracic echocardiogram with 2-D, M-Mode  and complete spectral and color flow Doppler.  INDICATION:Presence of prosthetic heart valve (Z95.2)  ------------------------------------------------------------------------  Dimensions:    Normal Values:  LA:     5.0    2.0 - 4.0 cm  Ao:     2.5    2.0 - 3.8 cm  SEPTUM: 0.6    0.6 - 1.2 cm  PWT:    0.80.6 - 1.1 cm  LVIDd:  4.4    3.0 - 5.6 cm  LVIDs:  2.7    1.8 - 4.0 cm  Derived variables:  LVMI: 63 g/m2  RWT: 0.36  Fractional short: 39 %  EF (Visual Estimate): 70 %  Doppler Peak Velocity (m/sec): AoV=1.5  ------------------------------------------------------------------------  Observations:  Mitral Valve: Mitral annular calcification. Thickened  mitral leaflets. Mild mitral regurgitation.  Aortic Valve/Aorta: Transcatheter aortic valve replacement.  Peak transaortic valve gradient equals 9 mm Hg, mean  transaortic valve gradient equals 4 mm Hg, which is  probably normal in the presence of a transcatheter aortic  valve replacement. No aortic valve regurgitation seen. Peak  left ventricular outflow tract gradient equals 1 mm Hg,  mean gradient is equal to 1 mm Hg, LVOT velocity time  integral equals 14 cm.  Aortic Root: 2.5 cm.  Left Atrium: Severely dilated left atrium.  LA volume index  = 69 cc/m2.  Left Ventricle: Normal left ventricular systolic function.  Septal motion consistentwith paced rhythm/conduction  defect. Normal left ventricular internal dimensions and  wall thicknesses.  Right Heart: Right atrium not well visualized, appears  mildly dilated. Mild right ventricular enlargement with  mildly decreased right ventricular systolic function. A  device wire is noted in the right heart. Normal tricuspid  valve. Mild-moderate tricuspid regurgitation. Normal  pulmonic valve. Mild pulmonic regurgitation.  Pericardium/Pleura: Normal pericardium with no pericardial  effusion.  Hemodynamic: Estimated right atrial pressure is 8 mm Hg.  Estimated right ventricular systolic pressure equals 57 mm  Hg, assuming right atrial pressure equals 8 mm Hg,  consistent with moderate pulmonary hypertension.  ------------------------------------------------------------------------  Conclusions:  1. Mitral annular calcification. Thickened mitral leaflets.  Mild mitral regurgitation.  2. Transcatheter aortic valve replacement. Peak transaortic  valve gradient equals 9 mm Hg, mean transaortic valve  gradient equals 4 mm Hg, which is probably normal in the  presence of a transcatheter aortic valve replacement. No  aortic valve regurgitation seen.  3. Normal left ventricular systolic function. Septal motion  consistent with paced rhythm/conduction defect.  4. Mild right ventricular enlargement with mildly decreased  right ventricular systolic function. A device wire is noted  in the right heart.  5. Estimated pulmonary artery systolic pressure equals 57  mm Hg, assuming right atrial pressure equals 8 mm Hg,  consistent with moderate pulmonary pressures.  *** Compared with echocardiogram of 2/15/2018, results are  similar on today's study.  ------------------------------------------------------------------------  Confirmed on 4/3/2018 - 15:19:53 by Meagan Carter M.D.  ------------------------------------------------------------------------    < end of copied text > 100.4F

## 2022-09-22 NOTE — H&P CARDIOLOGY - NSICDXFAMILYHX_GEN_ALL_CORE_FT
FAMILY HISTORY:  No pertinent family history in first degree relatives    Father  Still living? Unknown  Family history of myocardial infarction, Age at diagnosis: Age Unknown    Sibling  Still living? Unknown  Family history of myocardial infarction, Age at diagnosis: Age Unknown

## 2022-09-22 NOTE — H&P CARDIOLOGY - HISTORY OF PRESENT ILLNESS
73 yo PMH CAD, HLD, depression, hypothyroidism, GERD, former smoker, hx of alcohol and drug abuse (cocaine last used in 1988, alcohol 1989), hx multiple TIA (episodes of dysarthria) s/p loop recorder implant/removal who presented to Dr. Ordoñez for evaluation of SOB/ZAPATA and fatigue.  Denies chest pain/pressure, diziness, diaphoresis, palpitations, nausea, vomiting, peripheral edema, recent weight gain, or syncope.  CT angio heart 9/9/22 calcium score 1575 LM<50%; PLAD 50%; myocardial bridging of mid LAD, mild to lod LCx, mild to moderate RCA disease.  Pt. presents asymptomatic for further evaluation and cardiac cath.    Neuro: Dr. Zain Moreau   cards: Arya Ordoñez 71 yo PMH CAD, HLD, BHARATHI not on CPAP, depression, hypothyroidism, GERD, former smoker, hx of alcohol and drug abuse (cocaine last used in 1988, alcohol 1989), hx multiple TIA (episodes of dysarthria) s/p loop recorder implant/removal who presented to Dr. Ordoñez for evaluation of SOB/ZAPATA and fatigue.  Denies chest pain/pressure, diziness, diaphoresis, palpitations, nausea, vomiting, peripheral edema, recent weight gain, or syncope.  CT angio heart 9/9/22 calcium score 1575 LM<50%; PLAD 50%; myocardial bridging of mid LAD, mild to lod LCx, mild to moderate RCA disease.  Pt. presents asymptomatic for further evaluation and cardiac cath.    Neuro: Dr. Zain Moreau   cards: Arya Ordoñez

## 2022-09-22 NOTE — ASU DISCHARGE PLAN (ADULT/PEDIATRIC) - ASU DC SPECIAL INSTRUCTIONSFT
Wound Care:   the day AFTER your procedure remove bandage GENTTLY, and clean using  mild soap and gentle warm, water stream, pat dry. leave OPEN to air. YOU MAY SHOWER   DO NOT apply lotions, creams, ointments, powder, parfumes to your incision site  DO NOT SOAK your site for 1 week ( no baths, no pools, no tubs, etc...)  Check  your groin and /or wirst daily.A small amount of bruising, and soarness are normal    ACTIVITY: for 24 hours   - DO NOT DRIVE  - DO NOT make any important decisions or sign legal documents   - DO NOT operate heavy machinaries   - you may resume sexual activity in 48 hours, unless otherwise instructed by your cardiologist     If your procedure was done through the WRIST: for the NEXT 3DAYS:  - avoid pushing, pulling, with that affected wrist   - avoid repeated movement of that hand and wrist ( eg: typing, hammering)  - DO NOT LIFT anything more than 5 lbs     If your procedure was done through the GROIN: for the NEXT 5 DAYS  - Limit climbing stairs, DO NOT soak in bathtub or pool  - no strenous activities, pushing, pulling, straining  - Do not lift anything 10lbs or heavier     MEDICATION:   take your medications as explained ( see discharge paperwork)   If you received a STENT, you will be taking antiplatelet medications to KEEP YOUR STENT OPEN ( eg: Aspirin, Plavix, Brilinta, Effient, etc).  Take as prescribed DO NOT STOP taking them without consulting with your cardiologist first.     Follow heart healthy diet reccomended by your doctor, , if you smoke STOP SMOKING ( may call [phone] for center of tobacco control if you need assistance)     CALL your doctor to make appointment in 2 WEEKS     ***CALL YOUR DOCTOR***  if you experience: fever, chills, body aches, or severe pain, swelling, redness, heat or yellow discharge at incision site  If you experience Bleeding or excruciating pain at the procedural site, sweliing ( golf ball size) at your procedural site  If you experience CHEST PAIN  If you experience extremity numbness, tingling, temperature change ( of your procedural site)   If you are unable to reach your doctor, you may contact:   -Cardiology Office at [facility] at [phone] or   - [facility] [phone] - [facility] [phone]

## 2022-09-24 PROBLEM — Z87.891 PERSONAL HISTORY OF NICOTINE DEPENDENCE: Chronic | Status: ACTIVE | Noted: 2022-09-22

## 2022-09-24 PROBLEM — F10.10 ALCOHOL ABUSE, UNCOMPLICATED: Chronic | Status: ACTIVE | Noted: 2022-09-22

## 2022-09-24 PROBLEM — F19.10 OTHER PSYCHOACTIVE SUBSTANCE ABUSE, UNCOMPLICATED: Chronic | Status: ACTIVE | Noted: 2022-09-22

## 2022-09-25 NOTE — DISCUSSION/SUMMARY
[FreeTextEntry1] : CAD - new sx of angina and SOB.  Plan cath ASAP\par \par HTN- stable\par \par Lipids- controlled\par \par Followup in 3 weeks\par \par Cont MEDS [EKG obtained to assist in diagnosis and management of assessed problem(s)] : EKG obtained to assist in diagnosis and management of assessed problem(s)

## 2022-10-03 RX ORDER — ASPRIN AND EXTENDED-RELEASE DIPYRIDAMOLE 25; 200 MG/1; MG/1
25-200 CAPSULE ORAL TWICE DAILY
Qty: 180 | Refills: 2 | Status: DISCONTINUED | COMMUNITY
Start: 2017-05-02 | End: 2022-10-03

## 2022-10-04 ENCOUNTER — APPOINTMENT (OUTPATIENT)
Dept: PULMONOLOGY | Facility: CLINIC | Age: 72
End: 2022-10-04

## 2022-10-04 VITALS
TEMPERATURE: 97.8 F | HEART RATE: 52 BPM | OXYGEN SATURATION: 99 % | SYSTOLIC BLOOD PRESSURE: 168 MMHG | WEIGHT: 209.5 LBS | RESPIRATION RATE: 16 BRPM | DIASTOLIC BLOOD PRESSURE: 73 MMHG | BODY MASS INDEX: 26.05 KG/M2 | HEIGHT: 75 IN

## 2022-10-04 PROCEDURE — 99214 OFFICE O/P EST MOD 30 MIN: CPT

## 2022-10-04 NOTE — HISTORY OF PRESENT ILLNESS
[FreeTextEntry1] : 71yo M with history of heavy snoring, sleep fragmentation, excessive daytime somnolence -- ESS 17 -- presenting for management of sleep disordered breathing. He has experienced these symptoms for many years but they have worsened in severity. He has also had some expressive aphasia, concerning for TIAs and was referred for evaluation of SDB that might contribute to increased risk for this.\par He had been on CPAP but stopped with the recall\tianna Still has not received a new machine. Current one is almost 5 years old so will order new machine through insurance. \tianna Had recent cardiac stents 9/22 and doing well post procedure.

## 2022-10-04 NOTE — PHYSICAL EXAM
[General Appearance - In No Acute Distress] : no acute distress [Normal Conjunctiva] : the conjunctiva exhibited no abnormalities [Heart Rate And Rhythm] : heart rate was normal and rhythm regular [Heart Sounds] : normal S1 and S2 [] : no respiratory distress [Respiration, Rhythm And Depth] : normal respiratory rhythm and effort [Auscultation Breath Sounds / Voice Sounds] : lungs were clear to auscultation bilaterally [Abnormal Walk] : normal gait [Involuntary Movements] : no involuntary movements were seen [Nail Clubbing] : no clubbing of the fingernails [Non-Pitting] : non-pitting [Skin Color & Pigmentation] : normal skin color and pigmentation [No Focal Deficits] : no focal deficits [Oriented To Time, Place, And Person] : oriented to person, place, and time [Low Lying Soft Palate] : low lying soft palate [III] : III

## 2022-10-04 NOTE — ASSESSMENT
[FreeTextEntry1] : 71yo M with history of heavy snoring, sleep fragmentation, excessive daytime somnolence -- ESS 17 -- presenting for management of sleep disordered breathing. He has experienced these symptoms for many years but they have worsened in severity. He has also had some expressive aphasia, concerning for TIAs and was referred for evaluation of SDB that might contribute to increased risk for this.\par He had been on CPAP but stopped with the recall\par \par Had recent cardiac stents 9/22 and doing well post procedure. \par \par Still has not received a new machine. Current one is almost 5 years old so will order new machine through insurance. \par Will order new machine through insurance\par Patient will RTC after receiving new machine\par

## 2022-10-12 ENCOUNTER — APPOINTMENT (OUTPATIENT)
Dept: CARDIOLOGY | Facility: CLINIC | Age: 72
End: 2022-10-12

## 2022-10-12 ENCOUNTER — NON-APPOINTMENT (OUTPATIENT)
Age: 72
End: 2022-10-12

## 2022-10-12 VITALS
WEIGHT: 208 LBS | DIASTOLIC BLOOD PRESSURE: 74 MMHG | SYSTOLIC BLOOD PRESSURE: 151 MMHG | HEART RATE: 62 BPM | BODY MASS INDEX: 25.86 KG/M2 | HEIGHT: 75 IN | OXYGEN SATURATION: 97 %

## 2022-10-12 PROCEDURE — 93000 ELECTROCARDIOGRAM COMPLETE: CPT

## 2022-10-12 PROCEDURE — 99214 OFFICE O/P EST MOD 30 MIN: CPT

## 2022-10-17 ENCOUNTER — APPOINTMENT (OUTPATIENT)
Dept: NEUROLOGY | Facility: CLINIC | Age: 72
End: 2022-10-17

## 2022-11-03 NOTE — DISCUSSION/SUMMARY
[FreeTextEntry1] : CAD - s/p LAD stent without sx\par \par HTN- stable\par \par Lipids- controlled\par \par Followup in 3 mths\par \par Cont MEDS\par \par Time spent erviewing history, cath films, exam, pt discussion and note writing [EKG obtained to assist in diagnosis and management of assessed problem(s)] : EKG obtained to assist in diagnosis and management of assessed problem(s)

## 2022-11-30 ENCOUNTER — NON-APPOINTMENT (OUTPATIENT)
Age: 72
End: 2022-11-30

## 2022-11-30 ENCOUNTER — APPOINTMENT (OUTPATIENT)
Dept: CARDIOLOGY | Facility: CLINIC | Age: 72
End: 2022-11-30

## 2022-11-30 VITALS
DIASTOLIC BLOOD PRESSURE: 77 MMHG | WEIGHT: 208 LBS | BODY MASS INDEX: 25.86 KG/M2 | HEIGHT: 75 IN | HEART RATE: 56 BPM | SYSTOLIC BLOOD PRESSURE: 149 MMHG | OXYGEN SATURATION: 100 %

## 2022-11-30 PROCEDURE — 93000 ELECTROCARDIOGRAM COMPLETE: CPT

## 2022-11-30 PROCEDURE — 99214 OFFICE O/P EST MOD 30 MIN: CPT

## 2022-12-22 ENCOUNTER — APPOINTMENT (OUTPATIENT)
Dept: PULMONOLOGY | Facility: CLINIC | Age: 72
End: 2022-12-22

## 2022-12-22 VITALS
TEMPERATURE: 97.6 F | WEIGHT: 207 LBS | HEART RATE: 57 BPM | HEIGHT: 74.02 IN | DIASTOLIC BLOOD PRESSURE: 77 MMHG | BODY MASS INDEX: 26.56 KG/M2 | OXYGEN SATURATION: 99 % | SYSTOLIC BLOOD PRESSURE: 145 MMHG

## 2022-12-22 VITALS — HEART RATE: 62 BPM | OXYGEN SATURATION: 98 % | RESPIRATION RATE: 15 BRPM

## 2022-12-22 PROCEDURE — ZZZZZ: CPT

## 2022-12-22 PROCEDURE — 94729 DIFFUSING CAPACITY: CPT

## 2022-12-22 PROCEDURE — 94010 BREATHING CAPACITY TEST: CPT

## 2022-12-22 PROCEDURE — 94726 PLETHYSMOGRAPHY LUNG VOLUMES: CPT

## 2022-12-22 PROCEDURE — 99213 OFFICE O/P EST LOW 20 MIN: CPT | Mod: 25

## 2022-12-22 NOTE — HISTORY OF PRESENT ILLNESS
[Former] : former [>= 20 pack years] : >= 20 pack years [TextBox_4] : 72 year old male with history of BHARATHI on CPAP 9.  Also with history of hypertension, hld, CAD P stent placement in 9/2022 for shortness of breath.  Stents did not improve his SOB.  No history of MI.\par \tianna Stopped smoking in 1989.  1.5 PPD up to 3 PPD for 20 years.  Mother may have had emphysema.\par Has cough occasionally productive of sputum, no episodes of bronchitis. Clears throat every day- has nasal congestion, also has Barretts esophagus.  Every time he eats he gets nasal congestion.  \par \par No systemic symptoms.\tianna Still has not received machine.  \par \tianna Tries to walk 30-40 min every day, is a .  Can walk 30-40 min but stairs are a problem.  \tianna Had COVID in March 2022, SOB started there.   [TextBox_11] : 1.5 [TextBox_13] : 20 [YearQuit] : 1989

## 2022-12-22 NOTE — ASSESSMENT
[FreeTextEntry1] : 72 year old male with history of hypertension, hyperlipidemia, CAD post cardiac stents, former 30 pack year smoker who quit in 1989, comes in for evaluation of persistent ZAPATA and occasional cough. Also with history of BHARATHI waiting for a replacement machine.   On PE he is noted to desaturate to 95% from 99% at baseline after ambulation in the wallway 3x.  He has fine bibasilar rales.  There is no edema.\par \par Review of prior CTs noted on desktop:\par \par Cardiac CT 9/15/22 personally reviewed by me:  Has several lymph nodes in mediastinum and hlar regions bilaterall that are unchanged c/w 2018 CT.  Minimal patchy opacities seen in the peripheral aspect of both lungs of unclear etiology.  \par CT Chest from3/16/2018 personally reviewed by me. Report as follows:\par \par "CHEST WALL AND LOWER NECK: Cardiac device in the left chest wall. \par MEDIASTINUM AND ANILA: Borderline enlarged mediastinal lymph nodes the \par largest subcarinal 1.4 cm, nonspecific. \par HEART: Extensive coronary calcifications. 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 \par LUNG AND LARGE AIRWAYS: Subpleural groundglass opacities most prominent in \par the right lower lobe on 2:50. Scattered micronodules the largest 5 mm left \par lower lobe on 2:33, nonspecific. "\par \par Impression:  Signs and symptoms and prior imaging consistent with ILD,  Possibly related to GERD given history of Barretts esophagus.  Evidence on prior scans is there but subtle.  PFTs today are normal, suggesting early or minor process.  He has no history of rheumatologic disease.  No known exposures.  \par \par Plan:\par 1- chest CT to evaluate lung parenchyma\par 2- on return- will order labs if CT pans out\par 3- Have asked patient to forward notes from gastroenterologist.  Has he had barium swallow?\par Will advise further after above.  \par

## 2022-12-22 NOTE — PHYSICAL EXAM
[No Acute Distress] : no acute distress [Normal Appearance] : normal appearance [No Neck Mass] : no neck mass [Normal Rate/Rhythm] : normal rate/rhythm [Normal S1, S2] : normal s1, s2 [No Murmurs] : no murmurs [No Abnormalities] : no abnormalities [Normal Gait] : normal gait [No Clubbing] : no clubbing [No Edema] : no edema [Oriented x3] : oriented x3 [Normal Affect] : normal affect

## 2022-12-23 ENCOUNTER — NON-APPOINTMENT (OUTPATIENT)
Age: 72
End: 2022-12-23

## 2023-01-04 ENCOUNTER — APPOINTMENT (OUTPATIENT)
Dept: CARDIOLOGY | Facility: CLINIC | Age: 73
End: 2023-01-04

## 2023-01-10 ENCOUNTER — APPOINTMENT (OUTPATIENT)
Dept: CT IMAGING | Facility: CLINIC | Age: 73
End: 2023-01-10
Payer: MEDICARE

## 2023-01-10 ENCOUNTER — OUTPATIENT (OUTPATIENT)
Dept: OUTPATIENT SERVICES | Facility: HOSPITAL | Age: 73
LOS: 1 days | End: 2023-01-10
Payer: MEDICARE

## 2023-01-10 DIAGNOSIS — J84.9 INTERSTITIAL PULMONARY DISEASE, UNSPECIFIED: ICD-10-CM

## 2023-01-10 DIAGNOSIS — R06.2 WHEEZING: ICD-10-CM

## 2023-01-10 PROCEDURE — 71250 CT THORAX DX C-: CPT

## 2023-01-10 PROCEDURE — 71250 CT THORAX DX C-: CPT | Mod: 26,MH

## 2023-01-10 NOTE — ADDENDUM
[FreeTextEntry1] : 01/10/23- Patient is asymptomatic and is at low risk for cardiac complications of non cardiac procedure.  Aspirin and Plavix may be held for 7 days periprocedure

## 2023-01-17 ENCOUNTER — APPOINTMENT (OUTPATIENT)
Dept: NEUROLOGY | Facility: CLINIC | Age: 73
End: 2023-01-17

## 2023-01-19 ENCOUNTER — NON-APPOINTMENT (OUTPATIENT)
Age: 73
End: 2023-01-19

## 2023-01-20 ENCOUNTER — LABORATORY RESULT (OUTPATIENT)
Age: 73
End: 2023-01-20

## 2023-01-23 ENCOUNTER — APPOINTMENT (OUTPATIENT)
Dept: NEUROLOGY | Facility: CLINIC | Age: 73
End: 2023-01-23
Payer: MEDICARE

## 2023-01-23 PROCEDURE — 99213 OFFICE O/P EST LOW 20 MIN: CPT

## 2023-01-24 LAB
A1AT SERPL-MCNC: 186 MG/DL
ALBUMIN SERPL ELPH-MCNC: 4.5 G/DL
ALP BLD-CCNC: 81 U/L
ALT SERPL-CCNC: 12 U/L
ANA PAT FLD IF-IMP: ABNORMAL
ANACR T: ABNORMAL
ANION GAP SERPL CALC-SCNC: 12 MMOL/L
APTT BLD: 26.9 SEC
AST SERPL-CCNC: 13 U/L
BASOPHILS # BLD AUTO: 0.05 K/UL
BASOPHILS NFR BLD AUTO: 0.7 %
BILIRUB SERPL-MCNC: 0.3 MG/DL
BUN SERPL-MCNC: 17 MG/DL
CALCIUM SERPL-MCNC: 10 MG/DL
CARDIOLIPIN AB SER IA-ACNC: NEGATIVE
CCP AB SER IA-ACNC: <8 UNITS
CHLORIDE SERPL-SCNC: 104 MMOL/L
CO2 SERPL-SCNC: 27 MMOL/L
CREAT SERPL-MCNC: 0.85 MG/DL
CRP SERPL-MCNC: 4 MG/L
EGFR: 93 ML/MIN/1.73M2
ENA SCL70 IGG SER IA-ACNC: <0.2 AL
ENA SS-A AB SER IA-ACNC: <0.2 AL
ENA SS-B AB SER IA-ACNC: <0.2 AL
EOSINOPHIL # BLD AUTO: 0.04 K/UL
EOSINOPHIL NFR BLD AUTO: 0.6 %
ERYTHROCYTE [SEDIMENTATION RATE] IN BLOOD BY WESTERGREN METHOD: 33 MM/HR
GLUCOSE SERPL-MCNC: 104 MG/DL
HCT VFR BLD CALC: 38.6 %
HCYS SERPL-MCNC: 12.8 UMOL/L
HGB BLD-MCNC: 12.5 G/DL
IMM GRANULOCYTES NFR BLD AUTO: 0.4 %
INR PPP: 1.03 RATIO
LYMPHOCYTES # BLD AUTO: 1.35 K/UL
LYMPHOCYTES NFR BLD AUTO: 20 %
MAN DIFF?: NORMAL
MCHC RBC-ENTMCNC: 30.1 PG
MCHC RBC-ENTMCNC: 32.4 GM/DL
MCV RBC AUTO: 93 FL
MONOCYTES # BLD AUTO: 0.6 K/UL
MONOCYTES NFR BLD AUTO: 8.9 %
NEUTROPHILS # BLD AUTO: 4.67 K/UL
NEUTROPHILS NFR BLD AUTO: 69.4 %
NT-PROBNP SERPL-MCNC: 116 PG/ML
PLATELET # BLD AUTO: 219 K/UL
POTASSIUM SERPL-SCNC: 5 MMOL/L
PROT SERPL-MCNC: 7.1 G/DL
PT BLD: 11.9 SEC
RBC # BLD: 4.15 M/UL
RBC # FLD: 13.1 %
RF+CCP IGG SER-IMP: NEGATIVE
RHEUMATOID FACT SER QL: <10 IU/ML
SODIUM SERPL-SCNC: 143 MMOL/L
WBC # FLD AUTO: 6.74 K/UL

## 2023-01-25 NOTE — ED CDU PROVIDER SUBSEQUENT DAY NOTE - FAMILY HISTORY
Marlon Steinberg - Neurosurgery ([facility])  [facility] Medicine  Progress Note    Patient Name: Atif Neves  MRN: [id number]  Patient Class: IP- Inpatient   Admission Date: 1/20/2023  Length of Stay: 5 days  Attending Physician: Homero Bridges MD  Primary Care Provider: Ashley Richards MD        Subjective:     Principal Problem:Breakthrough seizure        HPI:  89-year-old man with a history of NPH status post  shunt, dementia, seizure disorder, functional quadriplegia with severe debility who was on home hospice presented to the emergency room today with family for concerns of breakthrough seizure.    Patients daughter(ZEYNEP)  Tete and son Virgil provide HPI.  Patient with Aphasia, can follow commands with repeated prompting cannot give detailed answers to questions.  The they report they report that patient has been having more frequent seizures in recent days, typically the features are staring spells followed by period of unresponsiveness or postictal.  Today he had 2 seizures followed by bouts of vomiting.  The emesis was described as yellow, nonbloody does not appear as coffee-grounds.    Patient last bowel movement is unknown.  Her his home medication list anti seizure medicine Keppra was not on it a previously on 1 g b.i.d. here [facility] prior to hospice enrollment.      On presentation ER workup is notable for initial lactic acid of 8.1 and orion to greater than 12 on repeat check, he was hypothermic and hypotensive today started on sepsis protocol with 30 cc/kg fluid bolus empiric antibiotics as, loaded with IV Keppra 1 g. In no acute respiratory distress and not requiring supplemental oxygen, hemodynamically stable after IV fluids given.    As per significant event note conversation held with patient's daughter and son after lactic acid level demonstrated rising values, exact etiology is unclear could be due to seizures or more insidious process that might be the cause of his worsening anemia,  hemoglobin is less than 7 she.  In discussion with family aggressive treatment likely would require ICU level of care placement of central line for appropriate resuscitation with fluids blood and hemodynamic monitoring.  Further CT imaging to evaluate for source of rising lactic acid also conducting further seizure workup a day.  Family described to emergency room physician that they would not want resuscitative measures applied and or artificial feeding, during my discussion describing potential ICU level of care ordered require testing for workup, Tete states that patient likely would not want to go through all those types of measures.  So described that we will continue anti seizure medicine antibiotics fluids intermittent lab checks in order to try to reverse any acute issue but if patient's condition continues to clinically worsen personal recommendation is to transition to fully comfort focused care where inpatient hospice might be most appropriate setting versus return to home hospice.    Tete was in agreement trial of medical workup at this time.             Overview/Hospital Course:  No notes on file    Interval History: No events overnight. Patient with no complaints this morning. Continue antibiotic treatment for pneumonia. Pending discharge to home with hospice.  Cultures NGTD, no further seizures noted. Family notat bedside unsure of MS baseline.    Review of Systems   Unable to perform ROS: Dementia   Constitutional:  Positive for fever.   Objective:     Vital Signs (Most Recent):  Temp: 98.2 °F (36.8 °C) (01/25/23 1200)  Pulse: 75 (01/25/23 1200)  Resp: 18 (01/25/23 1200)  BP: (!) 155/70 (01/25/23 1200)  SpO2: 98 % (01/25/23 1200)   Vital Signs (24h Range):  Temp:  [97.6 °F (36.4 °C)-99.3 °F (37.4 °C)] 98.2 °F (36.8 °C)  Pulse:  [64-83] 75  Resp:  [16-18] 18  SpO2:  [94 %-98 %] 98 %  BP: (127-161)/(60-74) 155/70     Weight: 86.2 kg (190 lb)  Body mass index is 28.89 kg/m².  No intake or output data  Father  Still living? Unknown  Family history of myocardial infarction, Age at diagnosis: Age Unknown     Sibling  Still living? Unknown  Family history of myocardial infarction, Age at diagnosis: Age Unknown in the 24 hours ending 01/25/23 1213   Physical Exam  Constitutional:       Appearance: Normal appearance. He is normal weight.   HENT:      Head: Normocephalic and atraumatic.      Mouth/Throat:      Mouth: Mucous membranes are moist.   Eyes:      Extraocular Movements: Extraocular movements intact.      Conjunctiva/sclera: Conjunctivae normal.   Cardiovascular:      Rate and Rhythm: Normal rate and regular rhythm.      Heart sounds: No murmur heard.  Pulmonary:      Effort: Pulmonary effort is normal. No respiratory distress.      Breath sounds: Normal breath sounds. No wheezing or rales.   Abdominal:      General: Abdomen is flat. There is no distension.      Palpations: Abdomen is soft.      Tenderness: There is no abdominal tenderness. There is no guarding.   Musculoskeletal:         General: No swelling or tenderness.   Skin:     Findings: No rash.   Neurological:      Mental Status: He is alert. Mental status is at baseline.   Psychiatric:         Mood and Affect: Mood normal.         Behavior: Behavior normal.       Significant Labs: All pertinent labs within the past 24 hours have been reviewed.  BMP:   Recent Labs   Lab 01/25/23  0348   GLU 87      K 3.1*      CO2 22*   BUN 10   CREATININE 0.9   CALCIUM 8.5*   MG 1.8       Significant Imaging: I have reviewed all pertinent imaging results/findings within the past 24 hours.      Assessment/Plan:      * Breakthrough seizure  - Seizure precautions  - Continue Keppra via PO route  - EEG without seizure activity   - Keppra level WNLs      Goals of care, counseling/discussion  Advance Care Planning     Today a meeting took place: bedside    Patient Participation: Patient is unable to participate     Attendees (Name and  Relationship to patient): Health care power of : JANESLENIN LEE, son MADIE FREEMAN    Staff attendees (Name and  Role): Trevin Garcia MD - admitting physician\  ACP Conversation (General): Understanding of current condition  patient with critical lactic acidosis, concerns for sepsis and acute anemia, concern that patient may not survive  such a critical illness. see HPI for details     Code Status: DNR; status confirmed/order placed in chart    ACP Documents: None    Goals of care: The family and healthcare power of   endorses that what is most important right now is to focus on symptom/pain control and improvement in condition but with limits to invasive therapies    Accordingly, we have decided that the best plan to meet the patient's goals includes continuing with treatment      Recommendations/  Follow-up tasks: Other (specify below) f/u on palliative care consultation - trend of labs and clinical condition - if patient with deterioration would advocate that transition to comfort focused care happen sooner given patient's age-comorbid conditions and existing hospice enrollment prior to admission      Length of ACP   conversation in minutes: 32 minutes           Severe sepsis  This patient does not have evidence of infective focus  My overall impression is severe sepsis. Vital signs were reviewed and noted in progress note.  Antibiotics given-   Antibiotics (From admission, onward)    Start     Stop Route Frequency Ordered    01/21/23 0830  piperacillin-tazobactam (ZOSYN) 4.5 g in dextrose 5 % in water (D5W) 5 % 100 mL IVPB (MB+)         -- IV Every 8 hours (non-standard times) 01/21/23 0724        Cultures were taken-   Microbiology Results (last 7 days)     Procedure Component Value Units Date/Time    Blood culture x two cultures. Draw prior to antibiotics. [[id number]] Collected: 01/20/23 1742    Order Status: Completed Specimen: Blood from Peripheral, Lower Arm, Left Updated: 01/23/23 2012     Blood Culture, Routine No Growth to date      No Growth to date      No Growth to date      No Growth to date    Narrative:      Aerobic and anaerobic    Blood culture x two cultures. Draw prior to antibiotics. [[id number]] Collected:  01/20/23 1742    Order Status: Completed Specimen: Blood from Peripheral, Lower Arm, Left Updated: 01/23/23 2012     Blood Culture, Routine No Growth to date      No Growth to date      No Growth to date      No Growth to date    Narrative:      Aerobic and anaerobic    Urine culture [[id number]] Collected: 01/21/23 1823    Order Status: Completed Specimen: Urine Updated: 01/23/23 0334     Urine Culture, Routine No growth    Narrative:      Specimen Source->Urine        Latest lactate reviewed, they are-  Recent Labs   Lab 01/21/23  1929 01/22/23  1009   LACTATE 3.5* 1.9       Organ dysfunction indicated by Encephalopathy   Source- Unknown source  Source control Achieved by- Empiric Antibiotics    - Urine culture NGTD  - Blood culture NGTD  - Continue Zosyn x5 days for aspiration pna coverage       Lactic acidosis  - Received 30cc/kg bolus int the ED  - Recived 1unit PRBC on admission for Hgb 6.8  - Lactate 8.4 > >21 > 4.1 > 2.4 > 3.5 > 1.9   - VBG shows compensated metabolic AG acidosis   - No liver dysfunction on presentation - no obvious home medicines that might cause severe rise in lactic acid   - Resolved      Aphasia  - Due to NPH      Acute on chronic anemia  Hx of GI bleed - angioectasia in duodenum seen on prior endoscopy   - Continuetart IV PPI  - s/p 1unit PRBC - type& screen and consent obtained by ED physicians.  - Trend iron panel  - Trend CBC    Functional quadriplegia  - Total assist at baseline  - Q2 turns  - Assist with feeding      Dementia associated with other underlying disease without behavioral disturbance  - Chronic stable      Ventriculo-peritoneal shunt status  - Shunt series and CT head w/o any abnormality to V/P shunt      Essential hypertension  - Holding any anti-hypertensives  - Restart as needed      VTE Risk Mitigation (From admission, onward)         Ordered     IP VTE HIGH RISK PATIENT  Once         01/21/23 0044     Place sequential compression device  Until discontinued          01/21/23 0044     Reason for No Pharmacological VTE Prophylaxis  Once        Question:  Reasons:  Answer:  Risk of Bleeding    01/21/23 0044     Place sequential compression device  Until discontinued         01/21/23 0044                Discharge Planning   ROMULO: 1/26/2023     Code Status: DNR   Is the patient medically ready for discharge?: No    Reason for patient still in hospital (select all that apply): Patient unstable  Discharge Plan A: Hospice/home                  Homero Bridgse MD  Department of Hospital Medicine   [facility] - Neurosurgery ([facility])

## 2023-02-03 LAB — COLLAGEN CTX SERPL-MCNC: 507 PG/ML

## 2023-02-08 ENCOUNTER — APPOINTMENT (OUTPATIENT)
Dept: CARDIOLOGY | Facility: CLINIC | Age: 73
End: 2023-02-08
Payer: MEDICARE

## 2023-02-08 ENCOUNTER — NON-APPOINTMENT (OUTPATIENT)
Age: 73
End: 2023-02-08

## 2023-02-08 VITALS
WEIGHT: 205 LBS | DIASTOLIC BLOOD PRESSURE: 69 MMHG | BODY MASS INDEX: 37.73 KG/M2 | HEART RATE: 58 BPM | SYSTOLIC BLOOD PRESSURE: 127 MMHG | HEIGHT: 62 IN | OXYGEN SATURATION: 99 %

## 2023-02-08 PROCEDURE — 99214 OFFICE O/P EST MOD 30 MIN: CPT

## 2023-02-08 PROCEDURE — 93000 ELECTROCARDIOGRAM COMPLETE: CPT

## 2023-02-08 RX ORDER — AMLODIPINE BESYLATE 2.5 MG/1
2.5 TABLET ORAL DAILY
Qty: 90 | Refills: 1 | Status: DISCONTINUED | COMMUNITY
Start: 2022-11-30 | End: 2023-02-08

## 2023-02-22 ENCOUNTER — NON-APPOINTMENT (OUTPATIENT)
Age: 73
End: 2023-02-22

## 2023-02-26 ENCOUNTER — TRANSCRIPTION ENCOUNTER (OUTPATIENT)
Age: 73
End: 2023-02-26

## 2023-02-27 ENCOUNTER — APPOINTMENT (OUTPATIENT)
Dept: PULMONOLOGY | Facility: CLINIC | Age: 73
End: 2023-02-27
Payer: MEDICARE

## 2023-02-27 VITALS
HEIGHT: 62 IN | WEIGHT: 203 LBS | RESPIRATION RATE: 17 BRPM | TEMPERATURE: 97.5 F | BODY MASS INDEX: 37.36 KG/M2 | SYSTOLIC BLOOD PRESSURE: 140 MMHG | HEART RATE: 61 BPM | OXYGEN SATURATION: 98 % | DIASTOLIC BLOOD PRESSURE: 70 MMHG

## 2023-02-27 PROCEDURE — 99215 OFFICE O/P EST HI 40 MIN: CPT

## 2023-02-27 NOTE — PHYSICAL EXAM
[No Acute Distress] : no acute distress [No Deformities] : no deformities [Normal Oropharynx] : normal oropharynx [Erythema] : erythema [Normal Appearance] : normal appearance [No Neck Mass] : no neck mass [Normal Rate/Rhythm] : normal rate/rhythm [Normal S1, S2] : normal s1, s2 [No Resp Distress] : no resp distress [No Abnormalities] : no abnormalities [Benign] : benign [Normal Gait] : normal gait [No Clubbing] : no clubbing [No Cyanosis] : no cyanosis [No Edema] : no edema [Normal Color/ Pigmentation] : normal color/ pigmentation [No Focal Deficits] : no focal deficits [Oriented x3] : oriented x3 [Normal Affect] : normal affect

## 2023-03-07 ENCOUNTER — NON-APPOINTMENT (OUTPATIENT)
Age: 73
End: 2023-03-07

## 2023-03-12 NOTE — PHYSICAL EXAM
[General Appearance - Well Developed] : well developed [Normal Appearance] : normal appearance [Well Groomed] : well groomed [General Appearance - Well Nourished] : well nourished [No Deformities] : no deformities [General Appearance - In No Acute Distress] : no acute distress [Normal Conjunctiva] : the conjunctiva exhibited no abnormalities [Eyelids - No Xanthelasma] : the eyelids demonstrated no xanthelasmas [Normal Oral Mucosa] : normal oral mucosa [No Oral Pallor] : no oral pallor [No Oral Cyanosis] : no oral cyanosis [Normal Jugular Venous A Waves Present] : normal jugular venous A waves present [No Jugular Venous Colbert A Waves] : no jugular venous colbert A waves [Normal Jugular Venous V Waves Present] : normal jugular venous V waves present [Respiration, Rhythm And Depth] : normal respiratory rhythm and effort [Exaggerated Use Of Accessory Muscles For Inspiration] : no accessory muscle use [Auscultation Breath Sounds / Voice Sounds] : lungs were clear to auscultation bilaterally [Heart Rate And Rhythm] : heart rate and rhythm were normal [Heart Sounds] : normal S1 and S2 [Murmurs] : no murmurs present [Abdomen Soft] : soft [Abdomen Tenderness] : non-tender [Abdomen Mass (___ Cm)] : no abdominal mass palpated [Abnormal Walk] : normal gait [Gait - Sufficient For Exercise Testing] : the gait was sufficient for exercise testing [Nail Clubbing] : no clubbing of the fingernails [Petechial Hemorrhages (___cm)] : no petechial hemorrhages [Cyanosis, Localized] : no localized cyanosis [Skin Color & Pigmentation] : normal skin color and pigmentation [No Venous Stasis] : no venous stasis [] : no rash [Skin Lesions] : no skin lesions [No Skin Ulcers] : no skin ulcer [No Xanthoma] : no  xanthoma was observed [Oriented To Time, Place, And Person] : oriented to person, place, and time [Affect] : the affect was normal [Mood] : the mood was normal [No Anxiety] : not feeling anxious

## 2023-03-13 NOTE — REVIEW OF SYSTEMS
[SOB on Exertion] : sob on exertion [GERD] : gerd [Fever] : no fever [Chills] : no chills [Cough] : no cough [Chest Discomfort] : no chest discomfort [Orthopnea] : no orthopnea [Arthralgias] : no arthralgias [Rash] : no rash [Headache] : no headache [Dizziness] : no dizziness

## 2023-03-13 NOTE — END OF VISIT
[] : Fellow [Time Spent: ___ minutes] : I have spent [unfilled] minutes of time on the encounter. [>50% of the face to face encounter time was spent on counseling and/or coordination of care for ___] : Greater than 50% of the face to face encounter time was spent on counseling and/or coordination of care for [unfilled] [FreeTextEntry3] : Agree with above-mentioned assessment and plan.  In summary this 72-year-old male with interstitial lung disease of unclear etiology.  Patient was referred to the interventional pulmonology clinic for flexible bronchoscopy with transbronchial biopsy and and envisia molecular  testing to rule out and assess for fibrotic etiology specifically usual interstitial pneumonia/UIP.  I recommend a multidisciplinary interstitial lung disease team discussion to assess if this CT suggest an alternative etiology to UIP in which case looking for inflammatory etiologies will entail doing transbronchial cryobiopsy or a surgical lung biopsy.  In case the imaging demonstrates probable or indeterminate for UIP then we can pursue an Envisia testing to assess for usual interstitial pneumonia as a possible finding of IPF.  We discussed this at length with Dr. Raisa Barnett.  The team will get back to us based upon radiology and ILD team review.  Further plans based upon above

## 2023-03-13 NOTE — HISTORY OF PRESENT ILLNESS
[Former] : former [TextBox_4] : Interventional Pulmonology Consultation/Visit Note\par \par 72 M BHARATHI on CPAP 9. Also with history of hypertension, hld, CAD s/p stent placement in 9/2022 for shortness of breath. Stents did not improve his SOB. \par 1 1/2 years of dyspnea on exertion particularly with climbing stairs. He can walk 30 minutes on flat ground without stopping. \tianna Was using CPAP previously, has been not using for about 1 year since old machine was recalled, has been working with his sleep physician to rectify this but as of right now is without cpap.\tianna Has e/o fibrosis on CT chest for which he was referred for consideration of bronchoscopy with biopsy\par \tianna Stopped smoking in 1989. 1.5 PPD up to 3 PPD for 20 years. [TextBox_11] : 2 [TextBox_13] : 20 [YearQuit] : 1989

## 2023-03-13 NOTE — CONSULT LETTER
[Dear  ___] : Dear  [unfilled], [Consult Letter:] : I had the pleasure of evaluating your patient, [unfilled]. [Please see my note below.] : Please see my note below. [Consult Closing:] : Thank you very much for allowing me to participate in the care of this patient.  If you have any questions, please do not hesitate to contact me. [Sincerely,] : Sincerely, [FreeTextEntry3] : Abran Powers MD\par Director of Interventional Pulmonology & Bronchoscopy\par . \par Division of Pulmonary, Critical Care & Sleep Medicine\par [facility].\par \par

## 2023-03-13 NOTE — ASSESSMENT
[FreeTextEntry1] : 72 M BHARATHI on CPAP 9. Also with history of hypertension, hld, CAD s/p stent placement in 9/2022 for shortness of breath although SOB persisted and with e/o fibrosis on CT chest he was referred for consideration of bronchoscopy with biopsy\par \par #Undifferentiated ILD\par B/l interstitial fibrotic appearing lung areas on CT chest, broad ddx, does not appear to have typical UIP or NSIP pattern, could be reflux related. weakly positive HEATHER, could be false positive, indeterminate atypical anca, rest of SANDEEP were unremarkable. No e/o restriction on most recent PFTs performed 12/22, there is a mild diffusion impairment.\par - To review with multidisciplinary ILD team best approach for bx, r/o IPF with envisia vs pursue cryobiopsy vs refer for surgical bx\par - risks/benefits/alternatives discussed with the patient\par \par The risk and benefits of bronchoscopy with transbronchial biopsy including risk of bleeding and  risk pneumothorax was discussed with the patient and they demonstrated understanding. In case of pneumothorax, we discussed the need for in hospital monitoring and chest tube placement. In case of bleeding, we explained that they may require inpatient or ICU admission if persistent. Educational reading material regarding the procedure, risks and benefits provided to the patient in paper format.\par

## 2023-03-13 NOTE — DISCUSSION/SUMMARY
[FreeTextEntry1] : The patients most recent CT scan was reviewed by my independently and my interpretation is stated below:\par \par Interstitial lung disease which has bilateral lower lobe predominant opacities mostly groundglass with underlying traction bronchiectasis.  Also has biapical paraseptal emphysema.

## 2023-03-16 RX ORDER — ASPIRIN ENTERIC COATED TABLETS 81 MG 81 MG/1
81 TABLET, DELAYED RELEASE ORAL
Refills: 6 | Status: DISCONTINUED | COMMUNITY
Start: 2022-10-03 | End: 2023-03-16

## 2023-03-28 ENCOUNTER — NON-APPOINTMENT (OUTPATIENT)
Age: 73
End: 2023-03-28

## 2023-03-29 ENCOUNTER — NON-APPOINTMENT (OUTPATIENT)
Age: 73
End: 2023-03-29

## 2023-03-31 ENCOUNTER — NON-APPOINTMENT (OUTPATIENT)
Age: 73
End: 2023-03-31

## 2023-03-31 ENCOUNTER — OUTPATIENT (OUTPATIENT)
Dept: OUTPATIENT SERVICES | Facility: HOSPITAL | Age: 73
LOS: 1 days | End: 2023-03-31

## 2023-03-31 VITALS
DIASTOLIC BLOOD PRESSURE: 75 MMHG | OXYGEN SATURATION: 98 % | SYSTOLIC BLOOD PRESSURE: 126 MMHG | RESPIRATION RATE: 16 BRPM | WEIGHT: 205.03 LBS | HEART RATE: 63 BPM | HEIGHT: 75 IN | TEMPERATURE: 97 F

## 2023-03-31 DIAGNOSIS — J84.9 INTERSTITIAL PULMONARY DISEASE, UNSPECIFIED: ICD-10-CM

## 2023-03-31 DIAGNOSIS — I25.10 ATHEROSCLEROTIC HEART DISEASE OF NATIVE CORONARY ARTERY WITHOUT ANGINA PECTORIS: ICD-10-CM

## 2023-03-31 DIAGNOSIS — G47.33 OBSTRUCTIVE SLEEP APNEA (ADULT) (PEDIATRIC): ICD-10-CM

## 2023-03-31 DIAGNOSIS — Z98.890 OTHER SPECIFIED POSTPROCEDURAL STATES: Chronic | ICD-10-CM

## 2023-03-31 DIAGNOSIS — I10 ESSENTIAL (PRIMARY) HYPERTENSION: ICD-10-CM

## 2023-03-31 DIAGNOSIS — E03.9 HYPOTHYROIDISM, UNSPECIFIED: ICD-10-CM

## 2023-03-31 DIAGNOSIS — G45.9 TRANSIENT CEREBRAL ISCHEMIC ATTACK, UNSPECIFIED: ICD-10-CM

## 2023-03-31 LAB
ANION GAP SERPL CALC-SCNC: 10 MMOL/L — SIGNIFICANT CHANGE UP (ref 7–14)
BUN SERPL-MCNC: 8 MG/DL — SIGNIFICANT CHANGE UP (ref 7–23)
CALCIUM SERPL-MCNC: 9.4 MG/DL — SIGNIFICANT CHANGE UP (ref 8.4–10.5)
CHLORIDE SERPL-SCNC: 105 MMOL/L — SIGNIFICANT CHANGE UP (ref 98–107)
CO2 SERPL-SCNC: 24 MMOL/L — SIGNIFICANT CHANGE UP (ref 22–31)
CREAT SERPL-MCNC: 0.89 MG/DL — SIGNIFICANT CHANGE UP (ref 0.5–1.3)
EGFR: 92 ML/MIN/1.73M2 — SIGNIFICANT CHANGE UP
GLUCOSE SERPL-MCNC: 110 MG/DL — HIGH (ref 70–99)
HCT VFR BLD CALC: 38.3 % — LOW (ref 39–50)
HGB BLD-MCNC: 12.5 G/DL — LOW (ref 13–17)
MCHC RBC-ENTMCNC: 30.2 PG — SIGNIFICANT CHANGE UP (ref 27–34)
MCHC RBC-ENTMCNC: 32.6 GM/DL — SIGNIFICANT CHANGE UP (ref 32–36)
MCV RBC AUTO: 92.5 FL — SIGNIFICANT CHANGE UP (ref 80–100)
NRBC # BLD: 0 /100 WBCS — SIGNIFICANT CHANGE UP (ref 0–0)
NRBC # FLD: 0 K/UL — SIGNIFICANT CHANGE UP (ref 0–0)
PLATELET # BLD AUTO: 184 K/UL — SIGNIFICANT CHANGE UP (ref 150–400)
POTASSIUM SERPL-MCNC: 4.4 MMOL/L — SIGNIFICANT CHANGE UP (ref 3.5–5.3)
POTASSIUM SERPL-SCNC: 4.4 MMOL/L — SIGNIFICANT CHANGE UP (ref 3.5–5.3)
RBC # BLD: 4.14 M/UL — LOW (ref 4.2–5.8)
RBC # FLD: 13.1 % — SIGNIFICANT CHANGE UP (ref 10.3–14.5)
SODIUM SERPL-SCNC: 139 MMOL/L — SIGNIFICANT CHANGE UP (ref 135–145)
WBC # BLD: 5.07 K/UL — SIGNIFICANT CHANGE UP (ref 3.8–10.5)
WBC # FLD AUTO: 5.07 K/UL — SIGNIFICANT CHANGE UP (ref 3.8–10.5)

## 2023-03-31 RX ORDER — PANTOPRAZOLE SODIUM 20 MG/1
1 TABLET, DELAYED RELEASE ORAL
Qty: 0 | Refills: 0 | DISCHARGE

## 2023-03-31 RX ORDER — FLUOXETINE HCL 10 MG
1 CAPSULE ORAL
Qty: 0 | Refills: 0 | DISCHARGE

## 2023-03-31 RX ORDER — SODIUM CHLORIDE 9 MG/ML
3 INJECTION INTRAMUSCULAR; INTRAVENOUS; SUBCUTANEOUS EVERY 8 HOURS
Refills: 0 | Status: DISCONTINUED | OUTPATIENT
Start: 2023-04-05 | End: 2023-04-19

## 2023-03-31 RX ORDER — LEVOTHYROXINE SODIUM 125 MCG
1 TABLET ORAL
Qty: 0 | Refills: 0 | DISCHARGE

## 2023-03-31 RX ORDER — SODIUM CHLORIDE 9 MG/ML
1000 INJECTION, SOLUTION INTRAVENOUS
Refills: 0 | Status: DISCONTINUED | OUTPATIENT
Start: 2023-04-05 | End: 2023-04-19

## 2023-03-31 RX ORDER — CLOPIDOGREL BISULFATE 75 MG/1
75 TABLET, FILM COATED ORAL DAILY
Qty: 1 | Refills: 1 | Status: DISCONTINUED | COMMUNITY
Start: 2022-10-03 | End: 2023-03-31

## 2023-03-31 NOTE — H&P PST ADULT - PROBLEM SELECTOR PLAN 3
BHARATHI precautions advised    **Sleep study in chart Cardiac evaluation requested by PST: CAD- patient only on Aggrenox BID not on low dose aspirin per chart notes , will obtain Cardiac evaluation requested by PST: CAD; non-compliant with aspirin- patient only on Aggrenox BID not on low dose aspirin per chart notes , Discussed with OMER Vega will obtain Cardiac evaluation requested by PST: CAD; non-compliant with aspirin- patient only on Aggrenox BID not on low dose aspirin per chart notes , Discussed with OMER Vega , note in chart, patient will start on low dose aspirin, patient informed

## 2023-03-31 NOTE — H&P PST ADULT - HISTORY OF PRESENT ILLNESS
72 yr old male presents with preop dx interstitial pulmonary disease scheduled for flexible endobronchial ultrasound bronchoscopy, transbronchial biopsy, envista testing with cytology, patient reports he was experiencing dyspnea, referred to cardiology and angioplasty performed September 2022 2 stents inserted, patient reports no improvement after intervention, referred to pulmonology "looks like lung fibrosis"

## 2023-03-31 NOTE — H&P PST ADULT - PROBLEM SELECTOR PLAN 1
Patient scheduled for surgery on: 4/5/23  Patient provided with verbal and written presurgical instructions  Verbalized understanding  with teach back on the following: personal protonix for GI prophylaxis with small sip of water

## 2023-03-31 NOTE — ADDENDUM
[FreeTextEntry1] : 03/31/23-  Pateint is at low risk for cardiac complications of noncardiac procedures or surgery.  He should remain on either aspirin 81 OR Aggrenox perioperatively.  He does not need to be on both

## 2023-03-31 NOTE — H&P PST ADULT - PROBLEM SELECTOR PLAN 4
Instructed to take levothyroxine as prescribed with small sips of water on date of service BHARATHI precautions advised    **Sleep study in chart

## 2023-03-31 NOTE — H&P PST ADULT - ATTENDING COMMENTS
Patient here for flexible bronchoscopy, lung biopsy, ebus and lymph node biopsy. Risks including but not limited to pneumothorax and bleeding discussed, and possible interventions. Agreeable to proceed.

## 2023-03-31 NOTE — H&P PST ADULT - NSICDXPASTMEDICALHX_GEN_ALL_CORE_FT
PAST MEDICAL HISTORY:  CAD (coronary artery disease)     CAD S/P percutaneous coronary angioplasty     Depressed     Drug abuse remote cocaine    ETOH abuse     Former smoker     Gastroesophageal reflux disease     GERD (gastroesophageal reflux disease)     High cholesterol     Hypothyroid     Hypothyroid     Interstitial pulmonary disease     BHARATHI on CPAP     Shortness of breath     TIA (transient ischemic attack)

## 2023-03-31 NOTE — H&P PST ADULT - CARDIOVASCULAR
regular rate and rhythm/S1 S2 present/no gallops/no rub/no murmur details… regular rate and rhythm/no gallops/no rub/vascular

## 2023-03-31 NOTE — H&P PST ADULT - OTHER CARE PROVIDERS
cardio Dr Arya Ordoñez [phone]  pulmonary Sr [facility] cardio Dr Arya Ordoñez [phone]  pulmonary Sr Anibal Freitas  cardio Dr Arya Ordoñez [phone]  pulmonary Sr Anibal Freitas [place]

## 2023-03-31 NOTE — H&P PST ADULT - PROBLEM SELECTOR PLAN 2
Cardiac evaluation requested by PST: Preop aggrenox plan and frequent TIA , will obtain Neurology office called to discuss preop Aggrenox plan, discussed with Kailyn Taveras Neurology office called to discuss preop Aggrenox plan, discussed with Kailyn Boggs- patient may stop Aggrenox 5 days prior to DOS and start low dose aspirin - she discussed plan with patient    Surgeon emailed and informed patient will be on low dose aspirin via email

## 2023-03-31 NOTE — DISCUSSION/SUMMARY
[FreeTextEntry1] : CAD - s/p LAD stent without sx\par \par HTN- stable\par \par Lipids- controlled\par \par Followup in 3 mths\par \par Cont MEDS\par \par Time spent reviewing history, cath films, exam, pt discussion and note writing

## 2023-03-31 NOTE — H&P PST ADULT - MUSCULOSKELETAL
details… right hip pain, RLE weakness and antalgic gait/ROM intact/strength 5/5 bilateral upper extremities/abnormal gait

## 2023-04-04 NOTE — ASU PATIENT PROFILE, ADULT - TEACHING/LEARNING LEARNING PREFERENCES
PAST MEDICAL HISTORY:  HTN (hypertension)     PAD (peripheral artery disease)     
verbal instruction/written material

## 2023-04-04 NOTE — ASU PATIENT PROFILE, ADULT - NS SC CAGE ALCOHOL CUT DOWN
History     Chief Complaint:  Fall    HPI   Angus Wynne is a 61 year old male, with a history of lumbago, cervicalgia, and on baby Aspirin, who presents with his son to the ED for evaluation of mechanical fall. Per nurse's report, the patient walked up 2 flights of stairs after the fall and again after EMS arrived. He ambulated to the stretcher by himself. He rates the pain as a 8/10 with sitting and a 10/10 when walking. Upon evaluation, the patient reports he slipped on his icy sloped driveway while walking out to his [employer] at 1:20-1:30PM today. He turned and hit his left hand then subsequently fell backwards and hit his upper back. He also has new worsening neck pain. He was assisted back into his room by his wife. The patient notes he laid for a while feeling okay. He developed pain with sitting up when he attempted to use the bathroom. The pain also worsens with standing which causes a pressure sensation. He as well has pain with deep breaths. The patient denies any head trauma, headache, shortness of breath, chest pain, abdominal pain, nausea, vomiting, incontinence, saddle paresthesia, leg pain/numbness/tingling/weakness.     Allergies:  No known drug allergies    Medications:    Lipitor  Clonazepam  Levothyroxine   Losartan  Zoloft  Trazodone    Past Medical History:    Lumbago  Colon polyps  Cervicalgia  Cervical spondylosis  Anxiety   Alcoholic hepatitis  GERD  BPPV  Hypothyroidism   HTN    Past Surgical History:    History reviewed. No pertinent surgical history.    Family History:    CAD  HLD    Social History:  Smoking status: Former smoker, Quit date 9/28/2015   Alcohol use: No  Presents to ED with son    Marital Status:   [2]     Review of Systems   Respiratory: Negative for shortness of breath.    Cardiovascular: Negative for chest pain.   Gastrointestinal: Negative for abdominal pain, nausea and vomiting.   Musculoskeletal: Positive for back pain, myalgias and neck pain.   Neurological:  "Negative for weakness, numbness and headaches.   All other systems reviewed and are negative.    Physical Exam     Patient Vitals for the past 24 hrs:   BP Temp Temp src Pulse Resp SpO2 Height Weight   02/03/19 2125 144/90 99.8  F (37.7  C) Oral 71 20 98 % -- --   02/03/19 1906 -- 99.1  F (37.3  C) -- -- -- -- -- --   02/03/19 1900 (!) 131/92 -- -- -- -- -- -- --   02/03/19 1800 -- -- -- -- -- 97 % -- --   02/03/19 1721 (!) 143/111 -- -- 70 16 98 % 1.803 m (5' 11\") 113.4 kg (250 lb)     Physical Exam  General: Resting on the bed.  Head: No obvious trauma to head.  Ears, Nose, Throat:  External ears normal.  Nose normal.  No pharyngeal erythema, swelling or exudate.  Midline uvula.  Clear TMs  Eyes:  Conjunctivae clear.  Pupils are equal, round, and reactive.   Neck: Normal range of motion.  Neck supple. mild lower c spine tenderness to palpation   CV: Regular rate and rhythm.  No murmurs.      Respiratory: Effort normal and breath sounds normal.  No wheezing or crackles.   Gastrointestinal: Soft.  No distension. There is no tenderness.  There is no rigidity, no rebound and no guarding.   Musculoskeletal: Normal range of motion.  Non tender extremities to palpations.  Tenderness to palpation of the mid thoracic spine and cervical spine.  Nontender lumbar spine.  No step-off or deformity.  Tenderness to snuffbox palpation and with axial loading of the left thumb.  Remainder of upper extremity is nontender.  Sensation is intact.  2+ radial and DP pulses are intact.  Neuro: Alert. Moving all extremities appropriately.  Normal speech.  CN II-XII grossly intact.  Gross muscle strength intact of the proximal and distal bilateral upper and lower extremities.  Sensation intact to light touch in all 4 extremities.   Skin: Skin is warm and dry.  No rash noted.     Emergency Department Course     Imaging:  Radiographic findings were communicated with the patient and family who voiced understanding of the findings.    XR Wrist " Left 3 Views  IMPRESSION: No definite scaphoid fracture, on AP view the scaphoid is  foreshortened and suboptimally visualized. Recommend additional  dedicated navicular views. As read by Radiology.     XR Hand Left 3 Views  IMPRESSION: Mild degenerative changes first carpometacarpal joint. No  evidence for acute fracture or dislocation. As read by Radiology.     CT Cervical Spine w/o Contrast  IMPRESSION: No evidence of acute fracture or subluxation in the  cervical spine. As read by Radiology.     CT Thoracic Spine w/o Contrast  IMPRESSION:  Acute T9 superior endplate compression fracture with less than 10%  height loss. No evidence of posterior extension or significant spinal  canal narrowing. As read by Radiology.     CT Lumbar Spine w/o Contrast  IMPRESSION:  No evidence of acute fracture or traumatic subluxation. As read by Radiology.     Laboratory:  CBC:  (L), o/w WNL (WBC 6.3, HGB 14.2)  BMP: Glucose 114(H), o/w WNL (Creatinine 0.77)     Emergency Department Course:  Patient arrived by EMS.     Past medical records, nursing notes, and vitals reviewed.  : I performed an exam of the patient and obtained history, as documented above.    IV inserted and blood drawn.    The patient was sent for a left wrist x-ray, left hand x-ray, cervical CT, thoracic CT, and lumbar CT while in the emergency department, findings above.     6: I spoke with Sal Dawson PA-C, of neurosurgery.    4: I rechecked the patient. Explained findings to patient and son.    2018: I spoke to Dr. Edward of the hospitalist service who accepts the patient for admission.     Patient had a velcro wrist/thumb spica splint placed.     Findings and plan explained to the Patient and son who consents to admission. Discussed the patient with Dr. Edward, who will admit the patient to an obs bed for further monitoring, evaluation, and treatment.     Impression & Plan      Medical Decision Makin-year-old male with history of alcohol  abuse presenting with mechanical fall.  Vital signs unremarkable.  Broad differential pursued including but not limited to fracture dislocation, pneumothorax, effusion, neurovascular injury, acute spinal cord impingement, acute intracranial process, etc.  Patient denies any head injury.  No loss of consciousness.  No indication for emergent head imaging.  Remainder of head to toe examination was unremarkable except for some snuffbox, scaphoid tenderness.  No indication for other imaging.  This was a mechanical fall.  There is tenderness to both the cervical and the thoracic spine to palpation.  CT of the cervical, thoracic, lumbar spine shows a acute T9 superior endplate compression fracture.  Patient is neurologically intact.  No saddle anesthesia, strength and motor intact distally.  No evidence of any acute spinal cord process.  CBC shows no leukocytosis or anemia.  BMP shows no acute electrolyte metabolic renal dysfunction.  X-ray of the hand and wrist are otherwise unremarkable.  Patient has a prescription scaphoid fracture will place in the removable splint.  Patient will need to follow-up with hand surgery.  Discussed this with patient he is recommended to keep splint on until he is able to follow-up with [facility] orthopedics.  Discussed compression fracture with neurosurgery.  Patient's pain is quite difficult to manage when he is upright.  Plan to admit to observation.  Neurosurgery will consult for likely procedure versus brace placement.  Discussed with Dr. Edward who graciously accepted.    Diagnosis:    ICD-10-CM   1. Closed compression fracture of thoracic vertebra, initial encounter (H) S22.000A   2. Acute midline thoracic back pain M54.6   3. Fall, initial encounter W19.XXXA     Disposition: Patient admitted to an obs bed by Dr. Cheyanne Bacon  2/3/2019    EMERGENCY DEPARTMENT    Scribe Disclosure:  Fara ROMO, am serving as a scribe at 5:41 PM on 2/3/2019 to document services  personally performed by Jo Whalen MD based on my observations and the provider's statements to me.        Jo Whalen MD  02/04/19 020     no

## 2023-04-05 ENCOUNTER — APPOINTMENT (OUTPATIENT)
Dept: PULMONOLOGY | Facility: HOSPITAL | Age: 73
End: 2023-04-05

## 2023-04-05 ENCOUNTER — TRANSCRIPTION ENCOUNTER (OUTPATIENT)
Age: 73
End: 2023-04-05

## 2023-04-05 ENCOUNTER — OUTPATIENT (OUTPATIENT)
Dept: OUTPATIENT SERVICES | Facility: HOSPITAL | Age: 73
LOS: 1 days | Discharge: HOME | End: 2023-04-05
Payer: MEDICARE

## 2023-04-05 ENCOUNTER — RESULT REVIEW (OUTPATIENT)
Age: 73
End: 2023-04-05

## 2023-04-05 VITALS
WEIGHT: 205.03 LBS | RESPIRATION RATE: 16 BRPM | OXYGEN SATURATION: 97 % | HEART RATE: 57 BPM | SYSTOLIC BLOOD PRESSURE: 133 MMHG | TEMPERATURE: 98 F | HEIGHT: 75 IN | DIASTOLIC BLOOD PRESSURE: 61 MMHG

## 2023-04-05 VITALS
DIASTOLIC BLOOD PRESSURE: 71 MMHG | OXYGEN SATURATION: 99 % | RESPIRATION RATE: 16 BRPM | TEMPERATURE: 98 F | SYSTOLIC BLOOD PRESSURE: 133 MMHG | HEART RATE: 59 BPM

## 2023-04-05 DIAGNOSIS — J84.9 INTERSTITIAL PULMONARY DISEASE, UNSPECIFIED: ICD-10-CM

## 2023-04-05 DIAGNOSIS — Z98.890 OTHER SPECIFIED POSTPROCEDURAL STATES: Chronic | ICD-10-CM

## 2023-04-05 LAB
B PERT IGG+IGM PNL SER: ABNORMAL
COLOR FLD: ABNORMAL
COMMENT - FLUIDS: SIGNIFICANT CHANGE UP
FLUID INTAKE SUBSTANCE CLASS: SIGNIFICANT CHANGE UP
GRAM STN FLD: SIGNIFICANT CHANGE UP
LYMPHOCYTES # FLD: 20 % — SIGNIFICANT CHANGE UP
MESOTHL CELL # FLD: 1 % — SIGNIFICANT CHANGE UP
MONOS+MACROS # FLD: 70 % — SIGNIFICANT CHANGE UP
NEUTROPHILS-BODY FLUID: 7 % — SIGNIFICANT CHANGE UP
NIGHT BLUE STAIN TISS: SIGNIFICANT CHANGE UP
OTHER CELLS FLD MANUAL: 2 % — SIGNIFICANT CHANGE UP
RCV VOL RI: SIGNIFICANT CHANGE UP CELLS/UL (ref 0–5)
SPECIMEN SOURCE FLD: SIGNIFICANT CHANGE UP
SPECIMEN SOURCE: SIGNIFICANT CHANGE UP
SPECIMEN SOURCE: SIGNIFICANT CHANGE UP
TOTAL NUCLEATED CELL COUNT, BODY FLUID: SIGNIFICANT CHANGE UP CELLS/UL (ref 0–5)
TUBE TYPE: SIGNIFICANT CHANGE UP

## 2023-04-05 PROCEDURE — 31652 BRONCH EBUS SAMPLNG 1/2 NODE: CPT | Mod: GC

## 2023-04-05 PROCEDURE — 88108 CYTOPATH CONCENTRATE TECH: CPT | Mod: 26,59

## 2023-04-05 PROCEDURE — 31632 BRONCHOSCOPY/LUNG BX ADDL: CPT | Mod: GC

## 2023-04-05 PROCEDURE — 88112 CYTOPATH CELL ENHANCE TECH: CPT | Mod: 26,59

## 2023-04-05 PROCEDURE — 31628 BRONCHOSCOPY/LUNG BX EACH: CPT | Mod: GC

## 2023-04-05 PROCEDURE — 88305 TISSUE EXAM BY PATHOLOGIST: CPT | Mod: 26

## 2023-04-05 PROCEDURE — 31624 DX BRONCHOSCOPE/LAVAGE: CPT | Mod: GC

## 2023-04-05 PROCEDURE — 88173 CYTOPATH EVAL FNA REPORT: CPT | Mod: 26

## 2023-04-05 PROCEDURE — 88312 SPECIAL STAINS GROUP 1: CPT | Mod: 26

## 2023-04-05 PROCEDURE — 31645 BRNCHSC W/THER ASPIR 1ST: CPT | Mod: GC

## 2023-04-05 PROCEDURE — 71045 X-RAY EXAM CHEST 1 VIEW: CPT | Mod: 26

## 2023-04-05 PROCEDURE — 88189 FLOWCYTOMETRY/READ 16 & >: CPT

## 2023-04-05 DEVICE — CATH FOGARTY 5FR X 80CM: Type: IMPLANTABLE DEVICE | Status: FUNCTIONAL

## 2023-04-05 RX ORDER — FLUOXETINE HCL 10 MG
1 CAPSULE ORAL
Refills: 0 | DISCHARGE

## 2023-04-05 RX ORDER — LEVOTHYROXINE SODIUM 125 MCG
1 TABLET ORAL
Refills: 0 | DISCHARGE

## 2023-04-05 RX ORDER — ASCORBIC ACID 60 MG
0 TABLET,CHEWABLE ORAL
Refills: 0 | DISCHARGE

## 2023-04-05 RX ORDER — CHOLECALCIFEROL (VITAMIN D3) 125 MCG
0 CAPSULE ORAL
Refills: 0 | DISCHARGE

## 2023-04-05 RX ORDER — PREGABALIN 225 MG/1
0 CAPSULE ORAL
Refills: 0 | DISCHARGE

## 2023-04-05 RX ORDER — PITAVASTATIN CALCIUM 1.04 MG/1
1 TABLET, FILM COATED ORAL
Refills: 0 | DISCHARGE

## 2023-04-05 RX ORDER — FENTANYL CITRATE 50 UG/ML
25 INJECTION INTRAVENOUS
Refills: 0 | Status: DISCONTINUED | OUTPATIENT
Start: 2023-04-05 | End: 2023-04-05

## 2023-04-05 RX ORDER — ASPIRIN AND DIPYRIDAMOLE 25; 200 MG/1; MG/1
1 CAPSULE, EXTENDED RELEASE ORAL
Refills: 0 | DISCHARGE

## 2023-04-05 RX ORDER — PANTOPRAZOLE SODIUM 20 MG/1
1 TABLET, DELAYED RELEASE ORAL
Refills: 0 | DISCHARGE

## 2023-04-05 RX ORDER — ONDANSETRON 8 MG/1
4 TABLET, FILM COATED ORAL ONCE
Refills: 0 | Status: DISCONTINUED | OUTPATIENT
Start: 2023-04-05 | End: 2023-04-19

## 2023-04-05 RX ORDER — FERROUS SULFATE 325(65) MG
0 TABLET ORAL
Refills: 0 | DISCHARGE

## 2023-04-05 NOTE — PACU DISCHARGE NOTE - PAIN:
Controlled with current regime Additional Notes: Patients states she is going back to her home state next week ([place]). Her Dermatologist in [place], [place] is Dr. Gildardo [facility] ([facility]). Additional Notes: Patients states she is going back to her home state next week ([place]). Her Dermatologist in [place], [place] is Dr. Gildardo [facility] ([facility]).

## 2023-04-05 NOTE — ASU DISCHARGE PLAN (ADULT/PEDIATRIC) - CARE PROVIDER_API CALL
Raisa Barnett)  Critical Care Medicine; Pulmonary Disease  [address] [place] [address]  Phone: [phone]  Fax: [phone]  Established Patient  Follow Up Time: 2 weeks

## 2023-04-05 NOTE — ASU DISCHARGE PLAN (ADULT/PEDIATRIC) - ASU DC SPECIAL INSTRUCTIONSFT
sore throat, cough with small amounts of blood is all normal. please return to the hospital if you develop severe chest pain, shortness of breath, large volume of blood in your cough.  please follow up with Dr. tracey as an outpatient to review your results

## 2023-04-05 NOTE — ASU DISCHARGE PLAN (ADULT/PEDIATRIC) - FOLLOW UP APPOINTMENTS
[facility], Ambulatory Surgical [facility] [phone]/[facility], Ambulatory Surgical Center

## 2023-04-05 NOTE — ASU DISCHARGE PLAN (ADULT/PEDIATRIC) - NS MD DC FALL RISK RISK
For information on Fall & Injury Prevention, visit: [email or web address] OR  [email or web address] OR  https://www.cdc.gov/steadi/patient.html

## 2023-04-06 LAB — TM INTERPRETATION: SIGNIFICANT CHANGE UP

## 2023-04-07 LAB
CULTURE RESULTS: SIGNIFICANT CHANGE UP
FUNGITELL B-D-GLUCAN,  BRONCHIAL LAVAGE: SIGNIFICANT CHANGE UP
P JIROVECII DNA L RESP QL NAA+NON-PROBE: POSITIVE
SPECIMEN SOURCE: SIGNIFICANT CHANGE UP
SPECIMEN SOURCE: SIGNIFICANT CHANGE UP

## 2023-04-09 LAB — NON-GYNECOLOGICAL CYTOLOGY STUDY: SIGNIFICANT CHANGE UP

## 2023-04-11 LAB — GALACTOMANNAN AG SERPL-ACNC: 0.21 INDEX — SIGNIFICANT CHANGE UP (ref 0–0.49)

## 2023-04-12 PROBLEM — K21.9 GASTRO-ESOPHAGEAL REFLUX DISEASE WITHOUT ESOPHAGITIS: Chronic | Status: ACTIVE | Noted: 2023-03-31

## 2023-04-12 PROBLEM — R06.02 SHORTNESS OF BREATH: Chronic | Status: ACTIVE | Noted: 2023-03-31

## 2023-04-12 PROBLEM — G47.33 OBSTRUCTIVE SLEEP APNEA (ADULT) (PEDIATRIC): Chronic | Status: ACTIVE | Noted: 2023-03-31

## 2023-04-12 PROBLEM — J84.9 INTERSTITIAL PULMONARY DISEASE, UNSPECIFIED: Chronic | Status: ACTIVE | Noted: 2023-03-31

## 2023-04-12 PROBLEM — I25.10 ATHEROSCLEROTIC HEART DISEASE OF NATIVE CORONARY ARTERY WITHOUT ANGINA PECTORIS: Chronic | Status: ACTIVE | Noted: 2023-03-31

## 2023-04-18 ENCOUNTER — APPOINTMENT (OUTPATIENT)
Dept: PULMONOLOGY | Facility: CLINIC | Age: 73
End: 2023-04-18
Payer: MEDICARE

## 2023-04-18 VITALS
SYSTOLIC BLOOD PRESSURE: 117 MMHG | HEART RATE: 69 BPM | TEMPERATURE: 97 F | DIASTOLIC BLOOD PRESSURE: 74 MMHG | BODY MASS INDEX: 37.36 KG/M2 | HEIGHT: 62 IN | OXYGEN SATURATION: 97 % | WEIGHT: 203 LBS | RESPIRATION RATE: 15 BRPM

## 2023-04-18 DIAGNOSIS — J43.9 EMPHYSEMA, UNSPECIFIED: ICD-10-CM

## 2023-04-18 DIAGNOSIS — B59 PNEUMOCYSTOSIS: ICD-10-CM

## 2023-04-18 DIAGNOSIS — K21.9 GASTRO-ESOPHAGEAL REFLUX DISEASE W/OUT ESOPHAGITIS: ICD-10-CM

## 2023-04-18 PROCEDURE — 99214 OFFICE O/P EST MOD 30 MIN: CPT

## 2023-04-18 NOTE — DISCUSSION/SUMMARY
[FreeTextEntry1] : 72 year old male with history of hypertension, hyperlipidemia, CAD post cardiac stents, former 30 pack year smoker who quit in 1989, comes in for evaluation of persistent ZAPATA and occasional cough. Also with history of BHARATHI recently reinitiated on CPAP therapy. On physical exam he has fine bibasilar rales without wheezes or LE edema.\par \par Review of prior CTs noted on desktop:\par \par Cardiac CT 9/15/22 personally reviewed by me: Has several lymph nodes in mediastinum and hlar regions bilaterall that are unchanged c/w 2018 CT. Minimal patchy opacities seen in the peripheral aspect of both lungs of unclear etiology. \par CT Chest from3/16/2018 personally reviewed by me. Report as follows:\par \par "CHEST WALL AND LOWER NECK: Cardiac device in the left chest wall. \par MEDIASTINUM AND ANILA: Borderline enlarged mediastinal lymph nodes the \par largest subcarinal 1.4 cm, nonspecific. \par HEART: Extensive coronary calcifications. 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 \par LUNG AND LARGE AIRWAYS: Subpleural groundglass opacities most prominent in \par the right lower lobe on 2:50. Scattered micronodules the largest 5 mm left \par lower lobe on 2:33, nonspecific. "\par \par He is now status post bronchoscopy with biopsy for envisia testing. Results thus far notable for positive PCP PCR. \par \par Impression: Signs and symptoms and prior imaging consistent with ILD, Possibly related to GERD given history of Barretts esophagus. Evidence on prior scans is there but subtle. PFTs are normal, suggesting early or minor process. He has no history of rheumatologic disease. No known exposures. Results from bronchoscopy may reflect colonization with PCP rather than true active infection. However, given higher risk and symptoms will opt for completion of bactrim course. Will also treat symptomatically with LAMA for emphysema seen on CT that may be contributing to cough. \par \par Plan:\par 1- Will follow up envisia testing; discussion on antifibrotic vs. other therapy pending results\par 2- CBC, CD4, HIV, fungitell, and LDH ordered today given positive PCP PCR\par 3- Barium swallow ordered for evaluation of aspiration given history of Barretts\par 4- Trial of spiriva for paraseptal emphysema and cough\par \par Will advise further after above.

## 2023-04-18 NOTE — PHYSICAL EXAM
[No Acute Distress] : no acute distress [Normal Appearance] : normal appearance [No Neck Mass] : no neck mass [Normal Rate/Rhythm] : normal rate/rhythm [Normal S1, S2] : normal s1, s2 [No Murmurs] : no murmurs [No Resp Distress] : no resp distress [Benign] : benign [No Clubbing] : no clubbing [No Edema] : no edema [No Focal Deficits] : no focal deficits [Oriented x3] : oriented x3 [Normal Affect] : normal affect [TextBox_68] : Inspiratory rales with basilar crackles, no wheezes or accessory muscle use [TextBox_99] : Right shoulder bruising

## 2023-04-18 NOTE — HISTORY OF PRESENT ILLNESS
[TextBox_4] : 73 yo M PMH BHARATHI on CPAP, HTN, HLD, CAD s/p stent (9/2022), prior tobacco use presenting for follow up post bronchoscopy. Patient initially seen on pulmoanry clinic 12/22/22 for symptoms of intermittent productive cough and shortness of breath. CT from January features of ILD, possible UIP, without impairment seen on PFTs. \par \par Underwent bronchoscopy with interventional pulmonary with biopsy for envisia testing. BAL positive for few haemophilus as well as +PCP PCR though negative GMS. Initiated on 21 days of bactrim therapy due to high risk. Denies significant improvement while on bactrim. Still with productive cough with white phlegm, unchanged in quantity since pre-procedure. ET is unlimited at moderate pace though with exertional dyspnea up stairs and hills. \par \par Also recently restarted on CPAP within the past month and noticed increased sinus congestion with usage. \par \par Stopped smoking in 1989. 1.5 PPD up to 3 PPD for 20 years\par Prior marijuana, speed, hallucinatories, intranasal cocaine (1988)

## 2023-04-18 NOTE — REVIEW OF SYSTEMS
[Cough] : cough [Sputum] : sputum [GERD] : gerd [Arthralgias] : arthralgias [Trauma/ Injury] : trauma/ injury [Fever] : no fever [Chills] : no chills [Chest Discomfort] : no chest discomfort [Dysuria] : no urgency

## 2023-04-24 ENCOUNTER — NON-APPOINTMENT (OUTPATIENT)
Age: 73
End: 2023-04-24

## 2023-04-25 ENCOUNTER — NON-APPOINTMENT (OUTPATIENT)
Age: 73
End: 2023-04-25

## 2023-04-25 LAB
BASOPHILS # BLD AUTO: 0.08 K/UL
BASOPHILS NFR BLD AUTO: 0.9 %
CD16+CD56+ CELLS # BLD: 120 CELLS/UL
CD16+CD56+ CELLS NFR BLD: 17 %
CD19 CELLS NFR BLD: 166 CELLS/UL
CD3 CELLS # BLD: 388 CELLS/UL
CD3 CELLS NFR BLD: 56 %
CD3+CD4+ CELLS # BLD: 311 CELLS/UL
CD3+CD4+ CELLS NFR BLD: 46 %
CD3+CD4+ CELLS/CD3+CD8+ CLL SPEC: 4.83 RATIO
CD3+CD8+ CELLS # SPEC: 64 CELLS/UL
CD3+CD8+ CELLS NFR BLD: 10 %
CELLS.CD3-CD19+/CELLS IN BLOOD: 24 %
EOSINOPHIL # BLD AUTO: 0.09 K/UL
EOSINOPHIL NFR BLD AUTO: 1 %
FUNGITELL QUANTITATIVE VALUE: <31 PG/ML
HCT VFR BLD CALC: 38.6 %
HGB BLD-MCNC: 12.9 G/DL
HIV1+2 AB SPEC QL IA.RAPID: NONREACTIVE
IMM GRANULOCYTES NFR BLD AUTO: 0.2 %
LDH SERPL-CCNC: 277 U/L
LYMPHOCYTES # BLD AUTO: 0.72 K/UL
LYMPHOCYTES NFR BLD AUTO: 7.9 %
MAN DIFF?: NORMAL
MCHC RBC-ENTMCNC: 30.9 PG
MCHC RBC-ENTMCNC: 33.4 GM/DL
MCV RBC AUTO: 92.3 FL
MONOCYTES # BLD AUTO: 0.51 K/UL
MONOCYTES NFR BLD AUTO: 5.6 %
NEUTROPHILS # BLD AUTO: 7.71 K/UL
NEUTROPHILS NFR BLD AUTO: 84.4 %
PLATELET # BLD AUTO: 232 K/UL
RBC # BLD: 4.18 M/UL
RBC # FLD: 13.2 %
WBC # FLD AUTO: 9.13 K/UL

## 2023-05-03 LAB
CULTURE RESULTS: SIGNIFICANT CHANGE UP
SPECIMEN SOURCE: SIGNIFICANT CHANGE UP

## 2023-05-15 ENCOUNTER — NON-APPOINTMENT (OUTPATIENT)
Age: 73
End: 2023-05-15

## 2023-05-15 ENCOUNTER — APPOINTMENT (OUTPATIENT)
Dept: CARDIOLOGY | Facility: CLINIC | Age: 73
End: 2023-05-15
Payer: MEDICARE

## 2023-05-15 VITALS
BODY MASS INDEX: 36.62 KG/M2 | WEIGHT: 199 LBS | DIASTOLIC BLOOD PRESSURE: 77 MMHG | HEIGHT: 62 IN | SYSTOLIC BLOOD PRESSURE: 138 MMHG | HEART RATE: 62 BPM | OXYGEN SATURATION: 98 %

## 2023-05-15 PROCEDURE — 93000 ELECTROCARDIOGRAM COMPLETE: CPT

## 2023-05-15 PROCEDURE — 99214 OFFICE O/P EST MOD 30 MIN: CPT

## 2023-05-15 RX ORDER — SODIUM PICOSULFATE, MAGNESIUM OXIDE, AND ANHYDROUS CITRIC ACID 10; 3.5; 12 MG/160ML; G/160ML; G/160ML
10-3.5-12 MG-GM LIQUID ORAL
Qty: 320 | Refills: 0 | Status: DISCONTINUED | COMMUNITY
Start: 2021-08-17 | End: 2023-05-15

## 2023-05-15 RX ORDER — PRAVASTATIN SODIUM 40 MG/1
40 TABLET ORAL
Qty: 90 | Refills: 2 | Status: DISCONTINUED | COMMUNITY
Start: 2020-11-10 | End: 2023-05-15

## 2023-05-15 RX ORDER — ASPIRIN 81 MG/1
81 TABLET, CHEWABLE ORAL
Refills: 0 | Status: ACTIVE | COMMUNITY

## 2023-05-15 RX ORDER — SULFAMETHOXAZOLE AND TRIMETHOPRIM 800; 160 MG/1; MG/1
800-160 TABLET ORAL TWICE DAILY
Qty: 42 | Refills: 0 | Status: DISCONTINUED | COMMUNITY
Start: 2023-04-10 | End: 2023-05-15

## 2023-05-15 RX ORDER — PITAVASTATIN CALCIUM 1.04 MG/1
1 TABLET, FILM COATED ORAL
Qty: 90 | Refills: 3 | Status: ACTIVE | COMMUNITY
Start: 2023-05-15

## 2023-05-15 RX ORDER — ASPRIN AND EXTENDED-RELEASE DIPYRIDAMOLE 25; 200 MG/1; MG/1
25-200 CAPSULE ORAL
Qty: 180 | Refills: 3 | Status: ACTIVE | COMMUNITY
Start: 2023-02-21 | End: 1900-01-01

## 2023-05-18 ENCOUNTER — APPOINTMENT (OUTPATIENT)
Dept: ORTHOPEDIC SURGERY | Facility: CLINIC | Age: 73
End: 2023-05-18
Payer: MEDICARE

## 2023-05-18 VITALS — BODY MASS INDEX: 36.62 KG/M2 | HEIGHT: 62 IN | WEIGHT: 199 LBS

## 2023-05-18 DIAGNOSIS — G89.29 LOW BACK PAIN, UNSPECIFIED: ICD-10-CM

## 2023-05-18 DIAGNOSIS — M25.551 PAIN IN RIGHT HIP: ICD-10-CM

## 2023-05-18 DIAGNOSIS — M54.50 LOW BACK PAIN, UNSPECIFIED: ICD-10-CM

## 2023-05-18 PROCEDURE — 99204 OFFICE O/P NEW MOD 45 MIN: CPT

## 2023-05-18 PROCEDURE — 73502 X-RAY EXAM HIP UNI 2-3 VIEWS: CPT

## 2023-05-18 NOTE — DISCUSSION/SUMMARY
[de-identified] : This patient is right hip pain.  The bulk of his pain appears to be extra-articular although he does have some pain with internal rotation.  This could represent labral pathology as his x-rays are normal and showing no evidence of osteoarthritis.  He should pursue his epidural steroid injections that were prescribed to him to see if that helps. An extensive discussion was conducted on the natural history of the disease and the variety of surgical and non-surgical options available to the patient including, but not limited to non-steroidal anti-inflammatory medications, steroid injections, physical therapy, maintenance of ideal body weight, and reduction of activity.  He had an MRI of the right hip and he will bring me the report if the epidural steroid injections do not help.  The patient will schedule an appointment as needed.\par

## 2023-05-18 NOTE — PHYSICAL EXAM
[de-identified] : Patient is well nourished, well-developed, in no acute distress, with appropriate mood and affect. The patient is oriented to time, place, and person. Respirations are even and unlabored. Gait evaluation does not reveal a limp. There is no inguinal adenopathy. Examination of the contralateral hip shows normal range of motion, strength, no tenderness, and intact skin. The affected limb is well-perfused and showed 2+ dp/pt pulses, without skin lesions, shows a grossly normal motor and sensory examination. Examination of the hip shows no skin lesions. Hip motion is full and painless from 0-90 degrees extension to flexion, 20 degrees adduction and 20 degrees abduction, and 15 degrees internal and 30 degrees external rotation. Leg lengths are approximately equal. FADIR is positive at the extreme and OFELIA is negative. Stinchfield test is negative. Both hips are stable and muscle strength is normal with good strength with resisted abduction and adduction. Pedal pulses are palpable. [de-identified] : AP and lateral x-rays of the right hip, pelvis, and femur were ordered and taken in the office and demonstrate no evidence of degenerative joint disease of the hip with maintained joint space and no evidence of fractures or other intraarticular pathology.

## 2023-05-18 NOTE — HISTORY OF PRESENT ILLNESS
[de-identified] : This is very nice 72-year-old gentleman experiencing right hip and groin and buttock pain, which is severe in intensity. The pain substantially limits activities of daily living. Walking tolerance is reduced he has tried right intra-articular cortisone injection 2 times with image guidance which did not yield any help.  He has known lumbar stenosis and is recommended for epidural injections but has not tried these yet.

## 2023-05-20 LAB
CULTURE RESULTS: SIGNIFICANT CHANGE UP
SPECIMEN SOURCE: SIGNIFICANT CHANGE UP

## 2023-06-11 NOTE — DISCUSSION/SUMMARY
[FreeTextEntry1] : CAD - s/p LAD stent without sx\par \par HTN- stable\par \par Lipids- controlled\par \par Followup in 6 mths\par \par Cont MEDS\par \par Time spent reviewing history, cath films, exam, pt discussion and note writing [EKG obtained to assist in diagnosis and management of assessed problem(s)] : EKG obtained to assist in diagnosis and management of assessed problem(s)

## 2023-06-11 NOTE — PHYSICAL EXAM
[General Appearance - Well Developed] : well developed [Normal Appearance] : normal appearance [Well Groomed] : well groomed [General Appearance - Well Nourished] : well nourished [No Deformities] : no deformities [General Appearance - In No Acute Distress] : no acute distress [Normal Conjunctiva] : the conjunctiva exhibited no abnormalities [Eyelids - No Xanthelasma] : the eyelids demonstrated no xanthelasmas [Normal Oral Mucosa] : normal oral mucosa [No Oral Pallor] : no oral pallor [No Oral Cyanosis] : no oral cyanosis [Normal Jugular Venous V Waves Present] : normal jugular venous V waves present [Normal Jugular Venous A Waves Present] : normal jugular venous A waves present [No Jugular Venous Colbert A Waves] : no jugular venous colbert A waves [Exaggerated Use Of Accessory Muscles For Inspiration] : no accessory muscle use [Respiration, Rhythm And Depth] : normal respiratory rhythm and effort [Auscultation Breath Sounds / Voice Sounds] : lungs were clear to auscultation bilaterally [Heart Rate And Rhythm] : heart rate and rhythm were normal [Murmurs] : no murmurs present [Heart Sounds] : normal S1 and S2 [Abdomen Soft] : soft [Abdomen Tenderness] : non-tender [Abdomen Mass (___ Cm)] : no abdominal mass palpated [Abnormal Walk] : normal gait [Gait - Sufficient For Exercise Testing] : the gait was sufficient for exercise testing [Cyanosis, Localized] : no localized cyanosis [Nail Clubbing] : no clubbing of the fingernails [Petechial Hemorrhages (___cm)] : no petechial hemorrhages [Skin Color & Pigmentation] : normal skin color and pigmentation [] : no rash [No Venous Stasis] : no venous stasis [No Skin Ulcers] : no skin ulcer [Skin Lesions] : no skin lesions [No Xanthoma] : no  xanthoma was observed [Oriented To Time, Place, And Person] : oriented to person, place, and time [Mood] : the mood was normal [Affect] : the affect was normal [No Anxiety] : not feeling anxious

## 2023-06-14 ENCOUNTER — APPOINTMENT (OUTPATIENT)
Dept: PULMONOLOGY | Facility: CLINIC | Age: 73
End: 2023-06-14
Payer: MEDICARE

## 2023-06-14 VITALS
TEMPERATURE: 97.1 F | HEART RATE: 62 BPM | DIASTOLIC BLOOD PRESSURE: 74 MMHG | BODY MASS INDEX: 37.17 KG/M2 | HEIGHT: 62 IN | WEIGHT: 202 LBS | OXYGEN SATURATION: 95 % | RESPIRATION RATE: 15 BRPM | SYSTOLIC BLOOD PRESSURE: 164 MMHG

## 2023-06-14 PROCEDURE — 99214 OFFICE O/P EST MOD 30 MIN: CPT

## 2023-06-14 NOTE — HISTORY OF PRESENT ILLNESS
[TextBox_4] : 71 yo M PMH BHARATHI on CPAP, HTN, HLD, CAD s/p stent (9/2022), prior tobacco use presenting for follow up post bronchoscopy. Patient initially seen by me on 12/22/22 for symptoms of intermittent productive cough and shortness of breath. CT from January showed features of ILD, possible UIP, without impairment seen on PFTs. \par \par Underwent bronchoscopy with interventional pulmonary with biopsy for envisia testing. BAL positive for few haemophilus as well as +PCP PCR though negative GMS. Initiated on 21 days of bactrim therapy due to high risk.Did not have significant improvement while on bactrim. Still with productive cough with white phlegm, unchanged in quantity since pre-procedure. ET is unlimited at moderate pace though with exertional dyspnea up stairs and hills. \par \par Also recently restarted on CPAP within the past month and noticed increased sinus congestion with usage. \par \tianna Stopped smoking in 1989. 1.5 PPD up to 3 PPD for 20 years\par Prior marijuana, speed, hallucinatories, intranasal cocaine (1988) \par \tianna Returns for follow up today accompanied by his wife.  He feels well, denies any increase in dyspnea, denies cough.  NO abdomnial pain or nausea. Started Ofev 150 BID 5 weeks ago. He is tolerating it well.  INitially had some diarrhea but it is no longer an issue.  He does not drink alcohol.  Taking it with meals, trying to be 12 hours apart.

## 2023-06-14 NOTE — DISCUSSION/SUMMARY
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  He reports stable exertional dyspnea, mild intermittent cough.  ON level ground and with normal actibities he is not dyspneic at all.  Feels well on Ofev aside from some mild intermittent diarrhea.   Also with history of BHARATHI recently reinitiated on CPAP therapy. On physical exam he has fine bibasilar rales without wheezes or LE edema. He reports hip and back pain today and is being evaluated by orthopedics.\par \par Review of prior CTs noted on desktop:\par \par Cardiac CT 9/15/22 personally reviewed by me: Has several lymph nodes in mediastinum and hlar regions bilaterall that are unchanged c/w 2018 CT. Minimal patchy opacities seen in the peripheral aspect of both lungs of unclear etiology. \par CT Chest from3/16/2018 personally reviewed by me. Report as follows:\par \par "CHEST WALL AND LOWER NECK: Cardiac device in the left chest wall. \par MEDIASTINUM AND ANILA: Borderline enlarged mediastinal lymph nodes the \par largest subcarinal 1.4 cm, nonspecific. \par HEART: Extensive coronary calcifications. 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 \par LUNG AND LARGE AIRWAYS: Subpleural groundglass opacities most prominent in \par the right lower lobe on 2:50. Scattered micronodules the largest 5 mm left \par lower lobe on 2:33, nonspecific. "\par \par He is now status post bronchoscopy with biopsy for envisia testing. Results werer positive PCP PCR. He has been treated.  \par in April 2023 LDH was 277, fungitell was negative and CD4 count was >300. Envisia testing was positive for UIP on 4/5/23.\par \par Impression: Signs and symptoms and prior imaging consistent with ILD, Possibly related to GERD given history of Barretts esophagus. Evidence on prior scans is there but subtle. PFTs are normal, suggesting early process. He has no history of rheumatologic disease. No known exposures. He is tolerating Ofev well.  \par \par Plan:\par 1- will check CMP and cbc/diff today.\par 2- Barium swallow- will perform prior to next visit\par 3- F/U in 4-6 weeks with repeat PFT on day of visit.  \par \par

## 2023-06-14 NOTE — PHYSICAL EXAM
[No Acute Distress] : no acute distress [Normal Appearance] : normal appearance [No Neck Mass] : no neck mass [Normal Rate/Rhythm] : normal rate/rhythm [Normal S1, S2] : normal s1, s2 [No Murmurs] : no murmurs [No Resp Distress] : no resp distress [Clear to Auscultation Bilaterally] : clear to auscultation bilaterally [No Abnormalities] : no abnormalities [Normal Gait] : normal gait [No Clubbing] : no clubbing [No Cyanosis] : no cyanosis [Normal Color/ Pigmentation] : normal color/ pigmentation [Oriented x3] : oriented x3 [Normal Affect] : normal affect

## 2023-06-15 ENCOUNTER — NON-APPOINTMENT (OUTPATIENT)
Age: 73
End: 2023-06-15

## 2023-06-15 LAB
ALBUMIN SERPL ELPH-MCNC: 3.9 G/DL
ALP BLD-CCNC: 71 U/L
ALT SERPL-CCNC: 12 U/L
ANION GAP SERPL CALC-SCNC: 13 MMOL/L
AST SERPL-CCNC: 15 U/L
BILIRUB SERPL-MCNC: 0.3 MG/DL
BUN SERPL-MCNC: 12 MG/DL
CALCIUM SERPL-MCNC: 9.2 MG/DL
CHLORIDE SERPL-SCNC: 106 MMOL/L
CO2 SERPL-SCNC: 22 MMOL/L
CREAT SERPL-MCNC: 0.91 MG/DL
EGFR: 90 ML/MIN/1.73M2
GLUCOSE SERPL-MCNC: 132 MG/DL
POTASSIUM SERPL-SCNC: 4.4 MMOL/L
PROT SERPL-MCNC: 6.4 G/DL
SODIUM SERPL-SCNC: 141 MMOL/L

## 2023-07-06 NOTE — ED CLERICAL - NS ED CLERK UNITS
[FreeTextEntry1] : 72 yo female presenting for f/u of bilateral knees. Patient is s/p left knee arthroscopy with partial medial meniscectomy, partial lateral meniscectomy and left knee CSI on 11/11/2022. Patient had right knee arthroscopy with partial medial and lateral meniscectomies and shaving chondroplasty on 1/19/22. Patient reporting that right knee pain is worse than left knee pain at this time.  approved left TKA.\par -Discussed with patient that she has moderate to advanced right knee oa but intraoperatively her djd was more advanced.\par -Discussed with patient risks, benefits, alternatives of right TKA. Discussed with patient will submit to  for surgical approval\par -Activities as tolerated\par -Rest, ice, compression, elevation, NSAIDs PRN for pain. \par -All questions answered\par -F/u after  approval\par \par The diagnosis was explained in detail. The potential non-surgical and surgical treatments were reviewed. The relative risks and benefits of each option were considered relative to the patient’s age, activity level, medical history, symptom severity and previously attempted treatments.\par \par The patient was advised to consult with their primary medical provider prior to initiation of any new medications to reduce the risk of adverse effects specific to their long-term home medications and medical history. The risk of gastrointestinal irritation and kidney injury specific to long-term NSAID use was discussed. CDU1

## 2023-07-13 ENCOUNTER — APPOINTMENT (OUTPATIENT)
Dept: PULMONOLOGY | Facility: CLINIC | Age: 73
End: 2023-07-13
Payer: MEDICARE

## 2023-07-13 VITALS
BODY MASS INDEX: 25.8 KG/M2 | SYSTOLIC BLOOD PRESSURE: 149 MMHG | HEART RATE: 67 BPM | DIASTOLIC BLOOD PRESSURE: 83 MMHG | OXYGEN SATURATION: 98 % | HEIGHT: 74 IN | WEIGHT: 201 LBS

## 2023-07-13 DIAGNOSIS — R06.02 SHORTNESS OF BREATH: ICD-10-CM

## 2023-07-13 PROCEDURE — 94010 BREATHING CAPACITY TEST: CPT

## 2023-07-13 PROCEDURE — 99214 OFFICE O/P EST MOD 30 MIN: CPT | Mod: 25

## 2023-07-13 PROCEDURE — 94726 PLETHYSMOGRAPHY LUNG VOLUMES: CPT

## 2023-07-13 PROCEDURE — ZZZZZ: CPT

## 2023-07-13 PROCEDURE — 94729 DIFFUSING CAPACITY: CPT

## 2023-07-13 NOTE — DISCUSSION/SUMMARY
[FreeTextEntry1] : 72 year old male with history of hypertension, hyperlipidemia, CAD post cardiac stents, former 30 pack year smoker who quit in 1989, with IPF ( positive Envisia testing by bronchoscopy) now on Ofev since May, initially tolerated well.  Now having severe diarrhea.  Denies infectious symptoms nausea or abdominal pain.  \par \par PE today is unchanged.\par PFTs show normal spirometry and lung volumes.  DLCO is mildly reduced.  \par \par Plan:\par -Hold Ofev until diarrhea resolves.\par -Repeat labs today.\par -Will order 100mg BID dose.  Advised to take one pill daily for a week then increase to twice daily if not having diarrhea.   Advised to come in after 4 weeks on the twice daily dose for lab work. \par -He is due for eGD and colonoscopy by his gi attending.  REcommended that he have pH monitoring of esophagus and barium swallow as well as he has severe GERd.  He is on omeprazole 40 daily.

## 2023-07-13 NOTE — HISTORY OF PRESENT ILLNESS
[>= 20 pack years] : >= 20 pack years [TextBox_4] : 72 year old male with history of hypertension, hyperlipidemia, CAD post cardiac stents, former 30 pack year smoker who quit in 1989, with IPF ( positive Envisia testing by bronchoscopy.Of note - BAL positive for few haemophilus as well as +PCP PCR though negative GMS. Initiated on 21 days of bactrim therapy due to high risk.Did not have significant improvement while on bactrim.) Still with productive cough with white phlegm, unchanged in quantity since pre-procedure. ET is unlimited at moderate pace though with exertional dyspnea up stairs and hills. \par  He has Baretts esophagus and has GERD. \par Started Ofev in May..     Also with history of BHARATHI recently reinitiated on CPAP therapy.  History of hip and back pain. \par Review of prior CTs noted on desktop:\par \par Cardiac CT 9/15/22 personally reviewed by me: Has several lymph nodes in mediastinum and hlar regions bilaterall that are unchanged c/w 2018 CT. Minimal patchy opacities seen in the peripheral aspect of both lungs of unclear etiology. \par CT Chest from3/16/2018 personally reviewed by me. Report as follows:\par \par "CHEST WALL AND LOWER NECK: Cardiac device in the left chest wall. \par MEDIASTINUM AND ANILA: Borderline enlarged mediastinal lymph nodes the \par largest subcarinal 1.4 cm, nonspecific. \par HEART: Extensive coronary calcifications. 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 \par LUNG AND LARGE AIRWAYS: Subpleural groundglass opacities most prominent in \par the right lower lobe on 2:50. Scattered micronodules the largest 5 mm left \par lower lobe on 2:33, nonspecific. "\par \par \par Returns for follow up today accompanied by his wife.  \par Having diarrhea several times a day despite Immodium.  he is on 150BID.  He is having 2 watery bms up to 4 per day.  No formed stool.  \par no nausea or vomiting lost 3 lbs 205-203.  \par Also experiencing some fatigue.  denies fever, chills, sweats or abdominal pain. [TextBox_11] : 1.5 [TextBox_13] : 20 [YearQuit] : 1989 [TextBox_29] : prior history of marijuana, cocaine

## 2023-07-14 LAB
ALBUMIN SERPL ELPH-MCNC: 4.1 G/DL
ALP BLD-CCNC: 77 U/L
ALT SERPL-CCNC: 9 U/L
ANION GAP SERPL CALC-SCNC: 11 MMOL/L
AST SERPL-CCNC: 16 U/L
BILIRUB SERPL-MCNC: 0.4 MG/DL
BUN SERPL-MCNC: 10 MG/DL
CALCIUM SERPL-MCNC: 9.1 MG/DL
CHLORIDE SERPL-SCNC: 109 MMOL/L
CO2 SERPL-SCNC: 26 MMOL/L
CREAT SERPL-MCNC: 0.8 MG/DL
EGFR: 95 ML/MIN/1.73M2
GLUCOSE SERPL-MCNC: 98 MG/DL
LPL SERPL-CCNC: 19 U/L
POTASSIUM SERPL-SCNC: 3.7 MMOL/L
PROT SERPL-MCNC: 6.5 G/DL
SODIUM SERPL-SCNC: 145 MMOL/L

## 2023-07-14 RX ORDER — NINTEDANIB 100 MG/1
100 CAPSULE ORAL
Qty: 60 | Refills: 0 | Status: DISCONTINUED | COMMUNITY
Start: 2023-07-14 | End: 2023-07-14

## 2023-07-20 ENCOUNTER — NON-APPOINTMENT (OUTPATIENT)
Age: 73
End: 2023-07-20

## 2023-08-17 ENCOUNTER — APPOINTMENT (OUTPATIENT)
Dept: PULMONOLOGY | Facility: CLINIC | Age: 73
End: 2023-08-17
Payer: MEDICARE

## 2023-08-17 VITALS
HEIGHT: 74 IN | OXYGEN SATURATION: 99 % | BODY MASS INDEX: 26.18 KG/M2 | DIASTOLIC BLOOD PRESSURE: 69 MMHG | HEART RATE: 55 BPM | SYSTOLIC BLOOD PRESSURE: 133 MMHG | RESPIRATION RATE: 15 BRPM | WEIGHT: 204 LBS | TEMPERATURE: 98.2 F

## 2023-08-17 VITALS
DIASTOLIC BLOOD PRESSURE: 78 MMHG | TEMPERATURE: 97.5 F | HEIGHT: 74 IN | BODY MASS INDEX: 26.18 KG/M2 | HEART RATE: 73 BPM | SYSTOLIC BLOOD PRESSURE: 146 MMHG | WEIGHT: 204 LBS | OXYGEN SATURATION: 96 % | RESPIRATION RATE: 15 BRPM

## 2023-08-17 PROCEDURE — 99214 OFFICE O/P EST MOD 30 MIN: CPT

## 2023-08-17 NOTE — ASSESSMENT
[FreeTextEntry1] : This is a 72-year-old male with significant past medical history of moderate obstructive sleep apnea and IPF who comes in to reestablish care.   #Obstructive sleep apnea–patient has a history of moderate obstructive sleep apnea and has been using PAP therapy for significant amount of time.  He is currently on CPAP of 9 cm H2O and is deriving benefit from it.  However, the patient has nearly diagnosis of IPF in the state of his oxygenation at night is not known.  Therefore should undergo an overnight oximetry test while using his CPAP device in order to determine if he has any nocturnal hypoxemia independent of sleep disordered breathing.  #Daytime sleepiness with BHARATHI–patient continues to complain of daytime sleepiness but appropriately using a CPAP device.  We will need to first determine if he is having any nocturnal hypoxemia which would need to be addressed first.  If this is normal, then I have started preliminary discussions about starting stimulant therapy regarding modafinil/armodafinil, or Sunosi.  We will stay away from amphetamines as he is a recovering drug addict.  Patient will follow-up with me in 1 to 2 weeks after his sleep study.

## 2023-08-17 NOTE — PHYSICAL EXAM
[Normal Oropharynx] : normal oropharynx [Normal Appearance] : normal appearance [No Neck Mass] : no neck mass [Normal Rate/Rhythm] : normal rate/rhythm [Normal S1, S2] : normal s1, s2 [No Murmurs] : no murmurs [No Resp Distress] : no resp distress [No Clubbing] : no clubbing [No Cyanosis] : no cyanosis [No Edema] : no edema [Oriented x3] : oriented x3 [Normal Affect] : normal affect [TextBox_68] : bibasilar rales right > left

## 2023-08-17 NOTE — HISTORY OF PRESENT ILLNESS
[FreeTextEntry1] : This is a 72-year-old male with significant past medical history of moderate obstructive sleep apnea and IPF who comes in to reestablish care.  He is stopped using his CPAP device for some time given the [employer]ane Respironics recall but recently received a DreamStation 2.  He has been using it on a nightly basis and does not know if he is deriving any benefit from it.  He continues to have daytime sleepiness and fatigue throughout the day.  He is able to sleep fine with his CPAP device.  He previously was following Dr. Atkinson but has switched care to me and referred by Dr. Barnett. [Obstructive Sleep Apnea] : obstructive sleep apnea [% Days used: ____] : Days used: [unfilled] % [% Days used > 4 hrs: ____] : Days used > 4 hrs: [unfilled] % [Mean nightly usage: ___ hrs] : Mean nightly usage: [unfilled] hrs [___ min(s)] : [unfilled] min(s) [Therapy based AHI: ___ /hr] : Therapy based AHI: [unfilled] / hr

## 2023-08-17 NOTE — REVIEW OF SYSTEMS
[Fatigue] : fatigue [Shortness Of Breath] : shortness of breath [Nocturia] : nocturia [Difficulty Initiating Sleep] : no difficulty falling asleep [Difficulty Maintaining Sleep] : no difficulty maintaining sleep [Lower Extremity Discomfort] : no lower extremity discomfort [Irresistible urge to move legs] : no irresistible urge to move legs because of lower extremity discomfort [LE discomfort relieved by movement] : lower extremity discomfort not relieved by movement [Late day/ Evening symptoms] : no late day/evening symptoms [Sleep Disturbances due to LE symptoms] : ~T no sleep disturbances due to lower extremity symptoms [Unusual Sleep Behavior] : no unusual sleep behavior [Hypersomnolence] : sleeping much more than usual [Cataplexy] :  no cataplexy [Hypnogogic Hallucinations] : no hypnogogic hallucinations [Hypnopompic Hallucinations] : no hypnopompic hallucinations [Sleep Paralysis] : no sleep paralysis [Negative] : Musculoskeletal [FreeTextEntry8] : cough

## 2023-08-17 NOTE — DISCUSSION/SUMMARY
[FreeTextEntry1] : 72 year old male with history of hypertension, hyperlipidemia, CAD post cardiac stents, former 30 pack year smoker who quit in 1989, with IPF ( positive Envisia testing by bronchoscopy) now on Ofev since May, initially tolerated well. Ofev held and then reduced to 100mg daily with improvement in BM. Now 1-2x per day loose stool.  He has been on OFev 100BID for about 10 days now. PE today is unchanged. PFTs show normal spirometry and lung volumes.  DLCO is mildly reduced.   Plan: Continue ofev at current dose 100mg BID for another month. If tolerating will consider increase to 150 at next office visit F/U in one month, will repeat labs at that time. INstructed to call me if he develops any new symptoms. Refer to sleep specialist- in our practice.  Having fatigue.  need to ensure BHARATHI is being appropriately treated.

## 2023-08-17 NOTE — PHYSICAL EXAM
[General Appearance - Well Developed] : well developed [Normal Appearance] : normal appearance [General Appearance - Well Nourished] : well nourished [Normal Oropharynx] : normal oropharynx [III] : III [Neck Appearance] : the appearance of the neck was normal [] : the neck was supple [Neck Cervical Mass (___cm)] : no neck mass was observed [Apical Impulse] : the apical impulse was normal [Heart Rate And Rhythm] : heart rate was normal and rhythm regular [Heart Sounds] : normal S1 and S2 [FreeTextEntry1] : William at the bases [Nail Clubbing] : no clubbing of the fingernails [Cyanosis, Localized] : no localized cyanosis [No Focal Deficits] : no focal deficits [Oriented To Time, Place, And Person] : oriented to person, place, and time [Impaired Insight] : insight and judgment were intact [Affect] : the affect was normal

## 2023-08-17 NOTE — HISTORY OF PRESENT ILLNESS
[>= 20 pack years] : >= 20 pack years [TextBox_4] : 72 year old male with history of hypertension, hyperlipidemia, CAD post cardiac stents, former 30 pack year smoker who quit in 1989, with IPF ( positive Envisia testing by bronchoscopy.Of note - BAL positive for few haemophilus as well as +PCP PCR though negative GMS. Initiated on 21 days of bactrim therapy due to high risk.Did not have significant improvement while on bactrim.) Still with productive cough with white phlegm, unchanged in quantity since pre-procedure. ET is unlimited at moderate pace though with exertional dyspnea up stairs and hills.   He has Baretts esophagus and has GERD.  Started Ofev in May..     Also with history of BHARATHI recently reinitiated on CPAP therapy.  History of hip and back pain.  Review of prior CTs noted on desktop:  Cardiac CT 9/15/22 personally reviewed by me: Has several lymph nodes in mediastinum and hlar regions bilaterall that are unchanged c/w 2018 CT. Minimal patchy opacities seen in the peripheral aspect of both lungs of unclear etiology.  CT Chest from3/16/2018 personally reviewed by me. Report as follows:  "CHEST WALL AND LOWER NECK: Cardiac device in the left chest wall.  MEDIASTINUM AND ANILA: Borderline enlarged mediastinal lymph nodes the  largest subcarinal 1.4 cm, nonspecific.  HEART: Extensive coronary calcifications.  VESSELS: Aberrant right subclavian artery with retroesophageal course.  LUNG AND LARGE AIRWAYS: Subpleural groundglass opacities most prominent in  the right lower lobe on 2:50. Scattered micronodules the largest 5 mm left  lower lobe on 2:33, nonspecific. "   Returns for follow up today.  Has been on Ofev 100mg BID. Having loose BM twice per day.  Has lost 4-5 lbs. Previously had solid BMs 2 or 3 x per day.  Denies abdominal pain or nausea.  Also reports fatigue.  Experiencing shortness of breath with exertion.  With bike riding.  Also with back hip issues getting PT.    No fever or sweats.   [TextBox_11] : 1.5 [TextBox_13] : 20 [YearQuit] : 1989 [TextBox_29] : prior history of marijuana, cocaine

## 2023-09-12 ENCOUNTER — OUTPATIENT (OUTPATIENT)
Dept: OUTPATIENT SERVICES | Facility: HOSPITAL | Age: 73
LOS: 1 days | End: 2023-09-12
Payer: MEDICARE

## 2023-09-12 ENCOUNTER — APPOINTMENT (OUTPATIENT)
Dept: SLEEP CENTER | Facility: CLINIC | Age: 73
End: 2023-09-12
Payer: MEDICARE

## 2023-09-12 DIAGNOSIS — Z98.890 OTHER SPECIFIED POSTPROCEDURAL STATES: Chronic | ICD-10-CM

## 2023-09-12 PROCEDURE — 94762 N-INVAS EAR/PLS OXIMTRY CONT: CPT

## 2023-09-12 PROCEDURE — ZZZZZ: CPT

## 2023-09-14 NOTE — H&P PST ADULT - PSYCHIATRIC
normal/normal affect/alert and oriented x3/normal behavior Quality 226: Preventive Care And Screening: Tobacco Use: Screening And Cessation Intervention: Patient screened for tobacco use and is an ex/non-smoker Quality 130: Documentation Of Current Medications In The Medical Record: Current Medications Documented Quality 431: Preventive Care And Screening: Unhealthy Alcohol Use - Screening: Patient not identified as an unhealthy alcohol user when screened for unhealthy alcohol use using a systematic screening method Quality 110: Preventive Care And Screening: Influenza Immunization: Influenza Immunization previously received during influenza season Detail Level: Generalized details…

## 2023-09-27 DIAGNOSIS — G47.33 OBSTRUCTIVE SLEEP APNEA (ADULT) (PEDIATRIC): ICD-10-CM

## 2023-09-28 ENCOUNTER — APPOINTMENT (OUTPATIENT)
Dept: PULMONOLOGY | Facility: CLINIC | Age: 73
End: 2023-09-28
Payer: MEDICARE

## 2023-09-28 VITALS
RESPIRATION RATE: 15 BRPM | TEMPERATURE: 98 F | SYSTOLIC BLOOD PRESSURE: 137 MMHG | BODY MASS INDEX: 25.28 KG/M2 | DIASTOLIC BLOOD PRESSURE: 76 MMHG | HEART RATE: 74 BPM | WEIGHT: 197 LBS | OXYGEN SATURATION: 96 % | HEIGHT: 74 IN

## 2023-09-28 DIAGNOSIS — G47.33 OBSTRUCTIVE SLEEP APNEA (ADULT) (PEDIATRIC): ICD-10-CM

## 2023-09-28 DIAGNOSIS — Z51.81 ENCOUNTER FOR THERAPEUTIC DRUG LVL MONITORING: ICD-10-CM

## 2023-09-28 PROCEDURE — 99214 OFFICE O/P EST MOD 30 MIN: CPT

## 2023-09-28 PROCEDURE — 99214 OFFICE O/P EST MOD 30 MIN: CPT | Mod: GC

## 2023-09-29 LAB
ALBUMIN SERPL ELPH-MCNC: 4.4 G/DL
ALP BLD-CCNC: 81 U/L
ALT SERPL-CCNC: 12 U/L
ANION GAP SERPL CALC-SCNC: 12 MMOL/L
AST SERPL-CCNC: 18 U/L
BILIRUB SERPL-MCNC: 0.5 MG/DL
BUN SERPL-MCNC: 9 MG/DL
CALCIUM SERPL-MCNC: 9.5 MG/DL
CHLORIDE SERPL-SCNC: 100 MMOL/L
CO2 SERPL-SCNC: 27 MMOL/L
CREAT SERPL-MCNC: 0.97 MG/DL
EGFR: 83 ML/MIN/1.73M2
FERRITIN SERPL-MCNC: 89 NG/ML
FOLATE SERPL-MCNC: 11.9 NG/ML
GLUCOSE SERPL-MCNC: 122 MG/DL
HCT VFR BLD CALC: 38.5 %
HGB BLD-MCNC: 12.8 G/DL
IRON SATN MFR SERPL: 20 %
IRON SERPL-MCNC: 59 UG/DL
LPL SERPL-CCNC: 24 U/L
MCHC RBC-ENTMCNC: 32.4 PG
MCHC RBC-ENTMCNC: 33.2 GM/DL
MCV RBC AUTO: 97.5 FL
PLATELET # BLD AUTO: 217 K/UL
POTASSIUM SERPL-SCNC: 3.9 MMOL/L
PROT SERPL-MCNC: 7 G/DL
RBC # BLD: 3.95 M/UL
RBC # FLD: 12.5 %
SODIUM SERPL-SCNC: 139 MMOL/L
TIBC SERPL-MCNC: 299 UG/DL
UIBC SERPL-MCNC: 241 UG/DL
VIT B12 SERPL-MCNC: 1127 PG/ML
WBC # FLD AUTO: 9.36 K/UL

## 2023-10-19 NOTE — H&P PST ADULT - NS PRO AD NO ADVANCE DIRECTIVE
Unable to reach patient for call back after patient's follow up appointment with PCP.   Left voicemail with call back number for patient to call if needed   If no voicemail available call attempts x 2 were made to contact the patient to assist with any questions or concerns patient may have.      
form provided and explained

## 2023-11-09 ENCOUNTER — APPOINTMENT (OUTPATIENT)
Dept: PULMONOLOGY | Facility: CLINIC | Age: 73
End: 2023-11-09
Payer: MEDICARE

## 2023-11-09 DIAGNOSIS — Z51.81 ENCOUNTER FOR THERAPEUTIC DRUG LVL MONITORING: ICD-10-CM

## 2023-11-09 DIAGNOSIS — R19.7 DIARRHEA, UNSPECIFIED: ICD-10-CM

## 2023-11-09 PROCEDURE — 99443: CPT | Mod: 95

## 2023-11-15 ENCOUNTER — APPOINTMENT (OUTPATIENT)
Dept: CARDIOLOGY | Facility: CLINIC | Age: 73
End: 2023-11-15
Payer: MEDICARE

## 2023-11-15 VITALS
OXYGEN SATURATION: 98 % | BODY MASS INDEX: 26.18 KG/M2 | WEIGHT: 204 LBS | HEART RATE: 56 BPM | HEIGHT: 74 IN | DIASTOLIC BLOOD PRESSURE: 80 MMHG | SYSTOLIC BLOOD PRESSURE: 131 MMHG

## 2023-11-15 DIAGNOSIS — E78.5 HYPERLIPIDEMIA, UNSPECIFIED: ICD-10-CM

## 2023-11-15 PROCEDURE — 99214 OFFICE O/P EST MOD 30 MIN: CPT

## 2023-11-15 PROCEDURE — 93000 ELECTROCARDIOGRAM COMPLETE: CPT

## 2023-11-15 RX ORDER — TIOTROPIUM BROMIDE 18 UG/1
18 CAPSULE ORAL; RESPIRATORY (INHALATION) DAILY
Qty: 30 | Refills: 5 | Status: DISCONTINUED | COMMUNITY
Start: 2023-04-18 | End: 2023-11-15

## 2023-11-17 ENCOUNTER — NON-APPOINTMENT (OUTPATIENT)
Age: 73
End: 2023-11-17

## 2023-11-21 ENCOUNTER — APPOINTMENT (OUTPATIENT)
Dept: PULMONOLOGY | Facility: CLINIC | Age: 73
End: 2023-11-21
Payer: MEDICARE

## 2023-11-21 VITALS
WEIGHT: 197 LBS | HEART RATE: 59 BPM | DIASTOLIC BLOOD PRESSURE: 82 MMHG | RESPIRATION RATE: 15 BRPM | OXYGEN SATURATION: 95 % | HEIGHT: 74 IN | BODY MASS INDEX: 25.28 KG/M2 | SYSTOLIC BLOOD PRESSURE: 149 MMHG | TEMPERATURE: 97.8 F

## 2023-11-21 PROCEDURE — 94010 BREATHING CAPACITY TEST: CPT

## 2023-11-21 PROCEDURE — 99215 OFFICE O/P EST HI 40 MIN: CPT | Mod: 25

## 2023-11-21 PROCEDURE — 36415 COLL VENOUS BLD VENIPUNCTURE: CPT

## 2023-11-21 PROCEDURE — ZZZZZ: CPT

## 2023-11-21 RX ORDER — PREDNISONE 10 MG/1
10 TABLET ORAL
Qty: 60 | Refills: 1 | Status: ACTIVE | COMMUNITY
Start: 2023-11-21 | End: 1900-01-01

## 2023-11-26 ENCOUNTER — OUTPATIENT (OUTPATIENT)
Dept: OUTPATIENT SERVICES | Facility: HOSPITAL | Age: 73
LOS: 1 days | End: 2023-11-26
Payer: MEDICARE

## 2023-11-26 ENCOUNTER — APPOINTMENT (OUTPATIENT)
Dept: CT IMAGING | Facility: IMAGING CENTER | Age: 73
End: 2023-11-26
Payer: MEDICARE

## 2023-11-26 DIAGNOSIS — J84.9 INTERSTITIAL PULMONARY DISEASE, UNSPECIFIED: ICD-10-CM

## 2023-11-26 DIAGNOSIS — Z98.890 OTHER SPECIFIED POSTPROCEDURAL STATES: Chronic | ICD-10-CM

## 2023-11-26 LAB
ALBUMIN SERPL ELPH-MCNC: 4.3 G/DL
ALP BLD-CCNC: 85 U/L
ALT SERPL-CCNC: 6 U/L
ANA PAT FLD IF-IMP: ABNORMAL
ANA SER IF-ACNC: ABNORMAL
ANION GAP SERPL CALC-SCNC: 13 MMOL/L
AST SERPL-CCNC: 11 U/L
BILIRUB SERPL-MCNC: 0.4 MG/DL
BUN SERPL-MCNC: 10 MG/DL
CALCIUM SERPL-MCNC: 9.3 MG/DL
CHLORIDE SERPL-SCNC: 103 MMOL/L
CO2 SERPL-SCNC: 25 MMOL/L
CREAT SERPL-MCNC: 0.89 MG/DL
EGFR: 91 ML/MIN/1.73M2
ERYTHROCYTE [SEDIMENTATION RATE] IN BLOOD BY WESTERGREN METHOD: 51 MM/HR
GLUCOSE SERPL-MCNC: 73 MG/DL
HCT VFR BLD CALC: 40.2 %
HGB BLD-MCNC: 12.8 G/DL
MCHC RBC-ENTMCNC: 31.4 PG
MCHC RBC-ENTMCNC: 31.8 GM/DL
MCV RBC AUTO: 98.8 FL
PLATELET # BLD AUTO: 247 K/UL
POTASSIUM SERPL-SCNC: 4.9 MMOL/L
PROT SERPL-MCNC: 7 G/DL
RBC # BLD: 4.07 M/UL
RBC # FLD: 13.2 %
SODIUM SERPL-SCNC: 140 MMOL/L
WBC # FLD AUTO: 7.63 K/UL

## 2023-11-26 PROCEDURE — 71250 CT THORAX DX C-: CPT | Mod: 26,MH

## 2023-11-26 PROCEDURE — 71250 CT THORAX DX C-: CPT

## 2023-12-07 ENCOUNTER — APPOINTMENT (OUTPATIENT)
Dept: PULMONOLOGY | Facility: CLINIC | Age: 73
End: 2023-12-07
Payer: MEDICARE

## 2023-12-07 VITALS
WEIGHT: 197 LBS | SYSTOLIC BLOOD PRESSURE: 130 MMHG | DIASTOLIC BLOOD PRESSURE: 61 MMHG | RESPIRATION RATE: 16 BRPM | BODY MASS INDEX: 25.28 KG/M2 | TEMPERATURE: 97.3 F | HEART RATE: 63 BPM | OXYGEN SATURATION: 97 % | HEIGHT: 74 IN

## 2023-12-07 PROCEDURE — 99214 OFFICE O/P EST MOD 30 MIN: CPT

## 2023-12-10 LAB
EJ AB SER QL: NEGATIVE
ENA JO1 AB SER IA-ACNC: <20 UNITS
ENA PM/SCL AB SER-ACNC: <20 UNITS
ENA SM+RNP AB SER IA-ACNC: <20 UNITS
ENA SS-A IGG SER QL: <20 UNITS
FIBRILLARIN AB SER QL: NEGATIVE
KU AB SER QL: NEGATIVE
MDA-5 (P140)(CADM-140): <20 UNITS
MI2 AB SER QL: NEGATIVE
NXP-2 (P140): <20 UNITS
OJ AB SER QL: NEGATIVE
PL12 AB SER QL: NEGATIVE
PL7 AB SER QL: NEGATIVE
SRP AB SERPL QL: NEGATIVE
TIF GAMMA (P155/140): <20 UNITS
U2 SNRNP AB SER QL: NEGATIVE

## 2023-12-12 ENCOUNTER — APPOINTMENT (OUTPATIENT)
Dept: PULMONOLOGY | Facility: CLINIC | Age: 73
End: 2023-12-12
Payer: MEDICARE

## 2023-12-12 VITALS
TEMPERATURE: 97.7 F | HEIGHT: 74 IN | HEART RATE: 62 BPM | OXYGEN SATURATION: 97 % | WEIGHT: 196.38 LBS | BODY MASS INDEX: 25.2 KG/M2 | RESPIRATION RATE: 15 BRPM | DIASTOLIC BLOOD PRESSURE: 83 MMHG | SYSTOLIC BLOOD PRESSURE: 145 MMHG

## 2023-12-12 DIAGNOSIS — J84.9 INTERSTITIAL PULMONARY DISEASE, UNSPECIFIED: ICD-10-CM

## 2023-12-12 PROCEDURE — 99214 OFFICE O/P EST MOD 30 MIN: CPT

## 2023-12-13 ENCOUNTER — OUTPATIENT (OUTPATIENT)
Dept: OUTPATIENT SERVICES | Facility: HOSPITAL | Age: 73
LOS: 1 days | End: 2023-12-13
Payer: MEDICARE

## 2023-12-13 ENCOUNTER — APPOINTMENT (OUTPATIENT)
Dept: CV DIAGNOSITCS | Facility: HOSPITAL | Age: 73
End: 2023-12-13

## 2023-12-13 DIAGNOSIS — J84.9 INTERSTITIAL PULMONARY DISEASE, UNSPECIFIED: ICD-10-CM

## 2023-12-13 DIAGNOSIS — Z98.890 OTHER SPECIFIED POSTPROCEDURAL STATES: Chronic | ICD-10-CM

## 2023-12-13 PROCEDURE — 93306 TTE W/DOPPLER COMPLETE: CPT | Mod: 26

## 2024-01-06 NOTE — DISCUSSION/SUMMARY
[EKG obtained to assist in diagnosis and management of assessed problem(s)] : EKG obtained to assist in diagnosis and management of assessed problem(s) [FreeTextEntry1] : CAD - s/p LAD stent without sx\par  \par  HTN- stable\par  \par  Lipids- controlled\par  \par  Followup in 6 mths\par  \par  Cont MEDS\par  \par  Time spent reviewing history, cath films, exam, pt discussion and note writing

## 2024-01-06 NOTE — PHYSICAL EXAM
[General Appearance - Well Developed] : well developed [Normal Appearance] : normal appearance [Well Groomed] : well groomed [General Appearance - Well Nourished] : well nourished [No Deformities] : no deformities [General Appearance - In No Acute Distress] : no acute distress [Normal Conjunctiva] : the conjunctiva exhibited no abnormalities [Eyelids - No Xanthelasma] : the eyelids demonstrated no xanthelasmas [Normal Oral Mucosa] : normal oral mucosa [No Oral Pallor] : no oral pallor [No Oral Cyanosis] : no oral cyanosis [Normal Jugular Venous A Waves Present] : normal jugular venous A waves present [Normal Jugular Venous V Waves Present] : normal jugular venous V waves present [No Jugular Venous Colbert A Waves] : no jugular venous colbert A waves [Respiration, Rhythm And Depth] : normal respiratory rhythm and effort [Exaggerated Use Of Accessory Muscles For Inspiration] : no accessory muscle use [Auscultation Breath Sounds / Voice Sounds] : lungs were clear to auscultation bilaterally [Heart Rate And Rhythm] : heart rate and rhythm were normal [Heart Sounds] : normal S1 and S2 [Murmurs] : no murmurs present [Abdomen Soft] : soft [Abdomen Tenderness] : non-tender [Abdomen Mass (___ Cm)] : no abdominal mass palpated [Gait - Sufficient For Exercise Testing] : the gait was sufficient for exercise testing [Abnormal Walk] : normal gait [Nail Clubbing] : no clubbing of the fingernails [Cyanosis, Localized] : no localized cyanosis [Petechial Hemorrhages (___cm)] : no petechial hemorrhages [Skin Color & Pigmentation] : normal skin color and pigmentation [] : no rash [No Venous Stasis] : no venous stasis [Skin Lesions] : no skin lesions [No Skin Ulcers] : no skin ulcer [No Xanthoma] : no  xanthoma was observed [Oriented To Time, Place, And Person] : oriented to person, place, and time [Affect] : the affect was normal [Mood] : the mood was normal [No Anxiety] : not feeling anxious

## 2024-01-15 ENCOUNTER — NON-APPOINTMENT (OUTPATIENT)
Age: 74
End: 2024-01-15

## 2024-01-17 NOTE — DISCUSSION/SUMMARY
[FreeTextEntry1] : ---Assessment plan----------The patient has been referred here for further opinion regarding pulmonary problem, 73 year old male with history of hypertension, hyperlipidemia, CAD post cardiac stents, former 30 pack year smoker who quit in 1989, with IPF ( positive Envisia testing by bronchoscopy) now on Ofev since May, initially tolerated well. NOW  Ofev heldDUE TO GI SYMPTOMS--- .  1. ILD -----UIP PATTERN--NOT TOLERATING high   OFEV---   CONDT LOW DOSE OFEV 100MG PO BID-----consider for inhaled REMODULIN TRIAL [TEINTON TRIAL ]  trial for patients with UIP pattern ILD-----ASLO ADD - Minimal improvement with Prednisone 2. Repeat CT, PFT, and echo ordered 3. Information provided and workup started for tyvaso trial  .Thanks for allowing  me to participate  in the care of this patient.  Patient at this time  will follow  the above mentioned recommendations and return back for follow up visit. If you have any questions  I can be reached  at # [phone] (office). Casa Bañuelos MD, [facility]  Pulmonary, Critical Care and Sleep Medicine

## 2024-01-17 NOTE — HISTORY OF PRESENT ILLNESS
[Former] : former [TextBox_4] : This letter  is regarding your patient  who  attended pulmonary out patient office today.  I have reviewed  patient's  past history, social history, family history and medication list. I also  reviewed nurse practitioners/ and fellows  notes and assessment and agree with it.   The patient was referred by   73 year old male with history of hypertension, hyperlipidemia, CAD post cardiac stents, former 30 pack year smoker who quit in 1989, with IPF ( positive Envisia testing by bronchoscopy) now on Ofev since May, initially tolerated well. Ofev held and then reduced to 100mg daily with improvement in BM.Seen on 9/28 with report of increasing loose BMS and no other symptoms. Weight was stable. now on ofev 100mg bid   ------No history of , fever, chills , rigors, chest pain, or hemoptysis. Questionable history of Raynaud's phenomenon. No h/o significant weight loss in last few months. No history of liver dysfunction , collagen vascular disorder or chronic thromboembolic disease. I would classify the patient's dyspnea as WHO  FUNCTIONAL CLASS II--------  ----Echo  date------ ----Pft date----PFTs show normal spirometry and lung volumes. DLCO is mildly reduced----- ----Ct scan date--jan 2023  IMPRESSION: interstitial lung disease with features suggestive of a diagnosis alternative to UIP, similar to September 2022 and increased compared to March 2018. Unchanged 9 mm left upper lobe nodule since March 2018.----- ----Cath date------------  11/2023- Pt hx of ILD and BHARATHI compliant on CPAP. Pt previously on Ofev dropped down to 100mg PO to decrease GI symptoms but has been off of medication for 2 weeks due to intolerable diarrhea and weight loss. Pt reports no respiratory complaints, occasional cough, up to date on vaccinations. -----We will obtain MYOMARKER  panel --- discussed with the patient about getting enrolled in and he will remodel and study for UIP pattern ILD  12/7/2023- Last visit he was given 2 weeks of Prednisone and information about Tyvasso trial as he was not tolerating Ofev. He states that he has unlimited walking capacity at a steady pace. He denies fevers, chills, or shortness of breath. He states that he does have a cough productive at times of whitish sputum.---Patient has been enrolled into TETON study today. Consent was signed today. All questions have been answered. Screening visit done today----spirometry and labs were done today ---will contd the low dose pfev of 100mg po bid- - - - -

## 2024-01-19 ENCOUNTER — APPOINTMENT (OUTPATIENT)
Dept: PULMONOLOGY | Facility: CLINIC | Age: 74
End: 2024-01-19

## 2024-01-23 ENCOUNTER — APPOINTMENT (OUTPATIENT)
Dept: NEUROLOGY | Facility: CLINIC | Age: 74
End: 2024-01-23
Payer: MEDICARE

## 2024-01-23 VITALS
DIASTOLIC BLOOD PRESSURE: 76 MMHG | HEIGHT: 74 IN | SYSTOLIC BLOOD PRESSURE: 163 MMHG | HEART RATE: 79 BPM | WEIGHT: 200 LBS | BODY MASS INDEX: 25.67 KG/M2

## 2024-01-23 DIAGNOSIS — G45.9 TRANSIENT CEREBRAL ISCHEMIC ATTACK, UNSPECIFIED: ICD-10-CM

## 2024-01-23 PROCEDURE — 93880 EXTRACRANIAL BILAT STUDY: CPT

## 2024-01-23 PROCEDURE — 99213 OFFICE O/P EST LOW 20 MIN: CPT

## 2024-01-23 PROCEDURE — 93886 INTRACRANIAL COMPLETE STUDY: CPT

## 2024-01-23 PROCEDURE — 93892 TCD EMBOLI DETECT W/O INJ: CPT

## 2024-01-23 NOTE — DISCUSSION/SUMMARY
[Antithrombotic therapy with ___] : antithrombotic therapy with  [unfilled] [Intensive Blood Pressure Control] : intensive blood pressure control [Lipid Lowering Therapy] : lipid lowering therapy [Patient encouraged to discuss with Primary MD] : I encouraged the patient to discuss these important issues with ~his/her~ primary care doctor [Goals and Counseling] : I have reviewed the goals of stroke risk factor modification. I counseled the patient on measures to reduce stroke risk, including the importance of medication compliance, risk factor control, exercise, healthy diet and avoidance of smoking. I reviewed stroke warning signs and symptoms and appropriate actions to take if such occur. [FreeTextEntry1] : Assessment (From Dr. Moreau's note) Mr. Jacobs is a 70Y/O R handed man with vascular risk factors of age, ? HTN, CAD and HPLD is seen in the vascular neurology office for the evaluation and management of an episodes concerning for TIA. Hypercoagulable workup to evaluate for primary thrombophilia appears to be unrevealing to my eye. CT chest, abdomen and pelvis (3/18) did not show any evidence of metastatic malignancy but showed nonspecific lung findings. He was noted to have elevated CA 27.29 of undetermined significance and was advised to discuss it further with his PCP. He is undergoing prolonged cardiac monitoring with ICM. In 2/19, he reports to have had another episode of word finding difficulties and expressive aphasia lasting for a few seconds to a minute. MRI brain (2/20/19) did not show evidence of acute infarct or hemorrhage but showed mild to moderate white matter hyperintensities of presumed vascular origin. 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 In 5/19, he reports to have had another similar episode of expressive aphasia which was associated with headache and retro-orbital pain with positive visual phenomena lasting for a few seconds to a minute. Of note, neuropsychological evaluation (2017) demonstrated evidence of behavior or disinhibition, which would be suggestive of frontal lobes dysfunction. Of note routine EEG performed in 2017 was reported to be normal in the awake, drowsy and asleep states.  Impression: 1. His presentation is consistent with transient left hemispheric dysfunction of undetermined etiology and differential diagnoses include transient ischemic attack involving left MCA distribution with probable mechanism being cryptogenic TIA (embolic phenomena from undetermined source) versus late life migraine accompaniment (especially with the most recent episode being associated with headache/retro-orbital pain and positive visual phenomena but duration of symptoms being atypical for migrainous phenomena) versus rule out simple/complex partial seizures without secondary generalization  2. Poor short-term memory likely secondary to mild cognitive impairment, considering progressive language disturbance/word finding difficulties; possibility of frontotemporal dementia/primary progressive aphasia needs to be further evaluated  He complains of bilateral fingertip numbness -if persists will send for a complete neuropathy work up.  Plan: 1. TIAs:  - Aggrenox 1 capsule twice a day (suspect clopidogrel failure) for secondary stroke prevention, indefinitely if not contraindicated due to any specific reasons in the future - stopped and changed to DAPT by cardiology for hx of new stents and resumed Aggrenox in 3/23 - Statin medication (pravastatin 20 mg at bedtime) as per her home dose considering likely nonatherosclerotic etiology of his symptoms and recent LDL; further dose titration is deferred to PCP/cardiologist based on medical indications/ASCVD risk profile - He should follow up closely with his primary care physician for optimal vascular risk factors modifications including blood pressure goal less than 130/80 mmHg, HbA1c goal <7 and preferably 6-6.5 and optimal cholesterol management  - He is advised to check blood pressure regularly at home, preferably at different times during the day and multiple days a week and was advised to keep a log of BPs to bring to primary care physician visit for further instructions regarding optimal BP management. Also advised to followup closely with PCP regarding regular blood work including monitoring of HbA1c and cholesterol profile   - CUS and TCDs reviewed ; Advised to consult/followup with his PCP regarding evaluation and management of thyroid nodule incidentally noted on CUS - There was a slight increase in BL ICA stenosis as compared to 2021. Discussed strict risk factor control. Follow up with cardiology regarding irregular HR noted on exam.    - 72 hours ambulatory EEG to evaluate for possible epileptiform discharges or nonconvulsive seizures, low clinical suspicion - negative  - Safety precautions were discussed with patient - Advised to follow up with PCP regarding further evaluation of elevated CA 27.29 - he reports repeat testing and it was elevated. He will continue to follow up with PCP   2. Poor short term memory: - Vitamin B1, folate, TSH and RPR - within normal limits - Continue with vitamin B12 supplement as per his PCP. He was to follow up with his PCP regarding evaluation and optimal management of vitamin B12 deficiency - Would follow up on results of recently performed neuropsychological testing - completed    Follow up with us yearly with repeat TCD and Doppler.   PCP: Dr. Yanes [phone] ([place])

## 2024-01-23 NOTE — HISTORY OF PRESENT ILLNESS
[FreeTextEntry1] : 68 Y/O R handed man with vascular risk factors of age, ? HTN, CAD, HPLD and newly diagnosed BHARATHI - on CPAP is seen in the vascular neurology office for the evaluation and management of an episode concerning for TIA.   He has no new neurological symptoms. He is feeling well. He just completed carotid Doppler He has had no new neurological symptoms or episodes.    From Dr. Moreau's note:  He reports that he has had 2 episodes in the past concerning for TIAs. Few years (1.5 years) ago, he was talking on the phone and suddenly developed language disturbance described as a combination of difficulty getting the words out of his mouth (though knew what he wanted to say) and non-sensical speech. His symptoms lasted for few seconds to a minute. He presented to the hospital but was not sure about the exact diagnosis at that time. In 5/2016, he had similar episode in the form of trouble getting the words out of the mouth and trouble expressing himself lasting for a few minutes with spontaneous resolution of his neurological deficits. He was evaluated by Dr. Jacobs (neurologist) at that time and was diagnosed to have TIA. Of note, he denies any history of migraines headaches or any visual aura in the past. He was taking aspirin before the first episode of TIA due to his history of CAD. His aspirin was switched to clopidogrel in 6/2016 after his second episode. He reports compliance with his medications and denies any significant side effects. He denies any similar episodes since 5/2016. He also denies any palpitation in the past. He denies any focal weakness, focal sensory changes, vision or language disturbance except described before, dysphagia or dysarthria in the past. He reports to have some short term memory disturbance without affecting his ability to perform ADLs. He also reported to have some trouble with the sense of directions. He is also reported to have some trouble with language in the form of trouble getting the words out of his mouth since last 2-3 months, which are reported to be getting worse lately. He is not reported to have any change in his behavior or personality lately. Of note, he has tried Lipitor and Crestor, which he was not able to tolerate due to side effects. On 3/29/17, he was driving and had acute onset of language disturbance lasting for about a minute and was described as not being able to get the words out of his mouth. He denies any headaches or any migrainous features at that time.   In 2/19, he had another episode of word finding difficulties lasting for about 30 seconds to a minute associated with headache and left retroorbital pain as well as vision disturbance in the form of "blurring of the vision - described as positive visual phenomena in the form of bright light over the left side, unsure being monocular vs. binocular lasting for few minutes and reports that junction of the positive visual phenomena and normal vision was "wavy and not sharp".   ROS: All negative except documented in the history of present illness.  Workup: MRI brain (4/15/17): No acute stroke, mild leukoaraiosis, no microhemorrhages MRA head and neck (4/50/17): No evidence of intracranial or extracranial large vessel severe stenosis of occlusion LDL: 120 (4/17) HbA1c: 5.6 (4/17) Transesophageal echocardiogram: Normal mitral glaucoma normal left atrium, no left atrial or left atrial appendage thrombus, normal left ventricle systolic function, mild apical hypokinesis, no left ventricular thrombus, agitated saline injection and color flow Doppler demonstrate evidence of small PFO MRV pelvis: No evidence of pelvic vein thrombosis Lower extremity duplex: No evidence of DVT Vitamin B1, folate, and RPR: Within normal limits  Vitamin B12 - 333 Prolonged cardiac monitoring with ICM: No A Fib yet   Home medications: Review with the patient and updated as appropriate.    Today he denies any new or worsening neurological signs or symptoms of stroke, TIA, and/or bleeding. He is reportedly compliant with his medication regimen and denies any adverse reactions. Denies any history of DVT, PE. He has a ILR with no reported events as yet. He has had neuro psych evaluation but reports his memory is improving. He continues to have problems with insurance covering the labs (ordered in 2018) and had a medicare hearing yesterday.   1/2023 Since last visit had 2 cardiac stents placed in 9/2022. He was taken off Aggrenox because of stents and placed on DAPT by cardiology and tolerating it well.   1/23/2024 Since last visit he was dx with pulmonary fibrosis and is under care of pulmonology. He is back on Aggrenox, after being on DAPT for cardiac stents. He is having a left hip replacement in 2/2024 at [facility]. Denies any new or worsening neurological symptoms.

## 2024-01-25 ENCOUNTER — NON-APPOINTMENT (OUTPATIENT)
Age: 74
End: 2024-01-25

## 2024-01-29 ENCOUNTER — APPOINTMENT (OUTPATIENT)
Dept: CARDIOLOGY | Facility: CLINIC | Age: 74
End: 2024-01-29
Payer: MEDICARE

## 2024-01-29 VITALS
HEIGHT: 74 IN | SYSTOLIC BLOOD PRESSURE: 159 MMHG | BODY MASS INDEX: 25.67 KG/M2 | HEART RATE: 56 BPM | WEIGHT: 200 LBS | DIASTOLIC BLOOD PRESSURE: 76 MMHG | OXYGEN SATURATION: 96 %

## 2024-01-29 DIAGNOSIS — I25.10 ATHEROSCLEROTIC HEART DISEASE OF NATIVE CORONARY ARTERY W/OUT ANGINA PECTORIS: ICD-10-CM

## 2024-01-29 DIAGNOSIS — I10 ESSENTIAL (PRIMARY) HYPERTENSION: ICD-10-CM

## 2024-01-29 DIAGNOSIS — E78.5 HYPERLIPIDEMIA, UNSPECIFIED: ICD-10-CM

## 2024-01-29 PROCEDURE — 93000 ELECTROCARDIOGRAM COMPLETE: CPT

## 2024-01-29 PROCEDURE — 99215 OFFICE O/P EST HI 40 MIN: CPT | Mod: 25

## 2024-01-29 RX ORDER — NINTEDANIB 150 MG/1
150 CAPSULE ORAL
Qty: 60 | Refills: 2 | Status: DISCONTINUED | COMMUNITY
Start: 2023-04-25 | End: 2024-01-29

## 2024-01-29 RX ORDER — PANTOPRAZOLE 40 MG/1
40 TABLET, DELAYED RELEASE ORAL DAILY
Qty: 90 | Refills: 0 | Status: DISCONTINUED | COMMUNITY
Start: 2017-05-02 | End: 2024-01-29

## 2024-01-29 RX ORDER — OMEPRAZOLE 40 MG/1
40 CAPSULE, DELAYED RELEASE ORAL
Qty: 30 | Refills: 3 | Status: ACTIVE | COMMUNITY
Start: 2024-01-29

## 2024-01-29 RX ORDER — FLUOXETINE HYDROCHLORIDE 40 MG/1
40 CAPSULE ORAL DAILY
Refills: 0 | Status: ACTIVE | COMMUNITY

## 2024-02-06 NOTE — PHYSICAL EXAM
[General Appearance - Well Developed] : well developed [Normal Appearance] : normal appearance [Well Groomed] : well groomed [No Deformities] : no deformities [General Appearance - Well Nourished] : well nourished [General Appearance - In No Acute Distress] : no acute distress [Normal Conjunctiva] : the conjunctiva exhibited no abnormalities [Eyelids - No Xanthelasma] : the eyelids demonstrated no xanthelasmas [Normal Oral Mucosa] : normal oral mucosa [No Oral Pallor] : no oral pallor [No Oral Cyanosis] : no oral cyanosis [Normal Jugular Venous A Waves Present] : normal jugular venous A waves present [Normal Jugular Venous V Waves Present] : normal jugular venous V waves present [No Jugular Venous Colbert A Waves] : no jugular venous colbert A waves [Respiration, Rhythm And Depth] : normal respiratory rhythm and effort [Exaggerated Use Of Accessory Muscles For Inspiration] : no accessory muscle use [Auscultation Breath Sounds / Voice Sounds] : lungs were clear to auscultation bilaterally [Heart Rate And Rhythm] : heart rate and rhythm were normal [Heart Sounds] : normal S1 and S2 [Murmurs] : no murmurs present [Abdomen Tenderness] : non-tender [Abdomen Soft] : soft [Abdomen Mass (___ Cm)] : no abdominal mass palpated [Abnormal Walk] : normal gait [Gait - Sufficient For Exercise Testing] : the gait was sufficient for exercise testing [Nail Clubbing] : no clubbing of the fingernails [Cyanosis, Localized] : no localized cyanosis [Petechial Hemorrhages (___cm)] : no petechial hemorrhages [Skin Color & Pigmentation] : normal skin color and pigmentation [] : no rash [No Venous Stasis] : no venous stasis [Skin Lesions] : no skin lesions [No Skin Ulcers] : no skin ulcer [No Xanthoma] : no  xanthoma was observed [Oriented To Time, Place, And Person] : oriented to person, place, and time [Affect] : the affect was normal [Mood] : the mood was normal [No Anxiety] : not feeling anxious

## 2024-02-06 NOTE — DISCUSSION/SUMMARY
[FreeTextEntry1] : CAD - s/p LAD stent without sx.  Patient is at low risk for cardiac complications of noncardiac surgery  HTN- stable  Lipids- controlled  Followup in 6 mths  Cont MEDS  Time spent reviewing history, cath films, exam, pt discussion and note writing [EKG obtained to assist in diagnosis and management of assessed problem(s)] : EKG obtained to assist in diagnosis and management of assessed problem(s)

## 2024-03-04 NOTE — ASU PATIENT PROFILE, ADULT - [id number]_GEN_A_CORE_SD
March 4, 2024      [facility] [address] [address]  [address]  [facility] [address]  Phone: [phone]       Patient: Gene Osorio   YOB: 2014  Date of Visit: 03/04/2024    To Whom It May Concern:    Jr Osorio  was at [facility] on 03/04/2024. The patient may return to work/school on 3/5/2024 with no restrictions. If you have any questions or concerns, or if I can be of further assistance, please do not hesitate to contact me.    Sincerely,    Gabriel Tellez MA     
no

## 2024-03-15 NOTE — PHYSICAL EXAM
[Normal Oropharynx] : normal oropharynx [No Acute Distress] : no acute distress [Normal Appearance] : normal appearance [No Neck Mass] : no neck mass [Normal Rate/Rhythm] : normal rate/rhythm [Normal S1, S2] : normal s1, s2 [No Murmurs] : no murmurs [No Resp Distress] : no resp distress [Clear to Auscultation Bilaterally] : clear to auscultation bilaterally [No Abnormalities] : no abnormalities [Benign] : benign [No Clubbing] : no clubbing [Normal Gait] : normal gait [No Edema] : no edema [No Cyanosis] : no cyanosis [FROM] : FROM [Normal Color/ Pigmentation] : normal color/ pigmentation [No Focal Deficits] : no focal deficits [Oriented x3] : oriented x3 [Normal Affect] : normal affect

## 2024-03-26 ENCOUNTER — APPOINTMENT (OUTPATIENT)
Dept: PULMONOLOGY | Facility: CLINIC | Age: 74
End: 2024-03-26
Payer: MEDICARE

## 2024-03-26 VITALS
TEMPERATURE: 97 F | HEIGHT: 74 IN | RESPIRATION RATE: 15 BRPM | WEIGHT: 201 LBS | SYSTOLIC BLOOD PRESSURE: 160 MMHG | DIASTOLIC BLOOD PRESSURE: 83 MMHG | HEART RATE: 64 BPM | BODY MASS INDEX: 25.8 KG/M2 | OXYGEN SATURATION: 94 %

## 2024-03-26 DIAGNOSIS — R05.9 COUGH, UNSPECIFIED: ICD-10-CM

## 2024-03-26 PROCEDURE — 99213 OFFICE O/P EST LOW 20 MIN: CPT

## 2024-03-26 NOTE — HISTORY OF PRESENT ILLNESS
[Former] : former [TextBox_4] : This letter  is regarding your patient  who  attended pulmonary out patient office today.  I have reviewed  patient's  past history, social history, family history and medication list. I also  reviewed nurse practitioners/ and fellows  notes and assessment and agree with it.   The patient was referred by   73 year old male with history of hypertension, hyperlipidemia, CAD post cardiac stents, former 30 pack year smoker who quit in 1989, with IPF ( positive Envisia testing by bronchoscopy) now on Ofev since May, initially tolerated well. Ofev held and then reduced to 100mg daily with improvement in BM.Seen on 9/28 with report of increasing loose BMS and no other symptoms. Weight was stable. now on ofev 100mg bid   ------No history of , fever, chills , rigors, chest pain, or hemoptysis. Questionable history of Raynaud's phenomenon. No h/o significant weight loss in last few months. No history of liver dysfunction , collagen vascular disorder or chronic thromboembolic disease. I would classify the patient's dyspnea as WHO  FUNCTIONAL CLASS II--------  ----Echo  date------ ----Pft date----PFTs show normal spirometry and lung volumes. DLCO is mildly reduced----- ----Ct scan date--jan 2023  IMPRESSION: interstitial lung disease ----, similar to September 2022 and increased compared to March 2018. Unchanged 9 mm left upper lobe nodule since March 2018.-----   nov 2023---ild   unchanged  from  jan 2023 11/2023- Pt hx of ILD and BHARATHI compliant on CPAP. Pt previously on Ofev dropped down to 100mg PO to decrease GI symptoms but has been off of medication for 2 weeks due to intolerable diarrhea and weight loss. Pt reports no respiratory complaints, occasional cough, up to date on vaccinations. -----We will obtain MYOMARKER  panel --- discussed with the patient about getting enrolled in and he will remodel and study for UIP pattern ILD  12/7/2023- Last visit he was given 2 weeks of Prednisone and information about Tyvasso trial as he was not tolerating Ofev. He states that he has unlimited walking capacity at a steady pace. He denies fevers, chills, or shortness of breath. He states that he does have a cough productive at times of whitish sputum.---Patient has been enrolled into TETON study today. Consent was signed today. All questions have been answered. Screening visit done today----spirometry and labs were done today ---will contd the low dose pfev of 100mg po bid- - - - -   3/26/2024---ILD and BHARATHI complaint on CPAP. pt will continue Ofev 100 mg BID--- patient is tolerating the GI symptoms----as per sponsor eligibilty report for HRCT done on 11/26/23, reviewer assessmnet was Probable UIP therefore, patient is re-enrolled into the TETON study today----Consent was signed today. All questions have been answered. Screening visit was done today. Spirometry and labs done today  FEV1: 3.44, FVC: 4.06, FEV1/FVC: .85

## 2024-03-26 NOTE — END OF VISIT
[Time Spent: ___ minutes] : I have spent [unfilled] minutes of time on the encounter. [FreeTextEntry3] :   I, Dr. Casa Bañuelos  personally performed the evaluation and management (E/M) services for this established patient who presents today with (a) new problem(s)/exacerbation of (an) existing condition(s). That E/M includes conducting the clinically appropriate interval history &/or exam, assessing all new/exacerbated conditions, and establishing a new plan of care. Today, my NAYLA, Zayda Foster NP, , was here to observe my evaluation and management service for this new problem/exacerbated condition and follow the plan of care established by me going forward.

## 2024-03-26 NOTE — DISCUSSION/SUMMARY
[FreeTextEntry1] : ---Assessment plan----------The patient has been referred here for further opinion regarding pulmonary problem, 73 year old male with history of hypertension, hyperlipidemia, CAD post cardiac stents, former 30 pack year smoker who quit in 1989, with IPF ( positive Envisia testing by bronchoscopy) now on Ofev since May, initially tolerated well. NOW  Ofev held DUE TO GI SYMPTOMS--- .  1. ILD -----UIP PATTERN--NOT TOLERATING high   OFEV---   CONDT LOW DOSE OFEV 100MG PO BID-----patient reconsented for inhaled REMODULIN TRIAL [TETON TRIAL ]  trial for patients with UIP ---all questions have been answered. spirometry and labs done. 2. Repeat CT, PFT,  3. follow up in 1 month   .Thanks for allowing  me to participate  in the care of this patient.  Patient at this time  will follow  the above mentioned recommendations and return back for follow up visit. If you have any questions  I can be reached  at # [phone] (office). Casa Bañuelos MD, [facility]  Pulmonary, Critical Care and Sleep Medicine

## 2024-03-29 PROBLEM — R05.9 COUGH: Status: ACTIVE | Noted: 2024-03-29

## 2024-03-29 RX ORDER — PREDNISONE 2.5 MG/1
2.5 TABLET ORAL
Qty: 270 | Refills: 3 | Status: ACTIVE | COMMUNITY
Start: 2024-03-29 | End: 1900-01-01

## 2024-04-10 NOTE — DISCUSSION/SUMMARY
[FreeTextEntry1] : ---Assessment plan----------The patient has been referred here for further opinion regarding pulmonary problem, 73 year old male with history of hypertension, hyperlipidemia, CAD post cardiac stents, former 30 pack year smoker who quit in 1989, with IPF ( positive Envisia testing by bronchoscopy) now on Ofev since May, initially tolerated well. NOW  Ofev held DUE TO GI SYMPTOMS--- .  1. ILD -----UIP PATTERN--NOT TOLERATING high   OFEV---   CONDT LOW DOSE OFEV 100MG PO BID-----patient reconsented for inhaled REMODULIN TRIAL [TETON TRIAL ]  trial for patients with UIP ---all questions have been answered. spirometry and labs done. 2. Repeat CT, PFT,  3. follow up in 1 month   .Thanks for allowing  me to participate  in the care of this patient.  Patient at this time  will follow  the above mentioned recommendations and return back for follow up visit. If you have any questions  I can be reached  at # [phone] (office). Casa Bañuelos MD, [facility]  Pulmonary, Critical Care and Sleep Medicine

## 2024-04-11 ENCOUNTER — APPOINTMENT (OUTPATIENT)
Dept: PULMONOLOGY | Facility: CLINIC | Age: 74
End: 2024-04-11
Payer: MEDICARE

## 2024-04-11 VITALS
DIASTOLIC BLOOD PRESSURE: 86 MMHG | HEIGHT: 74 IN | TEMPERATURE: 98.3 F | BODY MASS INDEX: 25.8 KG/M2 | OXYGEN SATURATION: 98 % | WEIGHT: 201 LBS | RESPIRATION RATE: 15 BRPM | HEART RATE: 58 BPM | SYSTOLIC BLOOD PRESSURE: 147 MMHG

## 2024-04-11 PROCEDURE — 94729 DIFFUSING CAPACITY: CPT

## 2024-04-11 PROCEDURE — ZZZZZ: CPT

## 2024-04-11 PROCEDURE — 94010 BREATHING CAPACITY TEST: CPT

## 2024-04-11 PROCEDURE — 99213 OFFICE O/P EST LOW 20 MIN: CPT | Mod: 25

## 2024-04-15 NOTE — DISCUSSION/SUMMARY
[EKG obtained to assist in diagnosis and management of assessed problem(s)] : EKG obtained to assist in diagnosis and management of assessed problem(s) [FreeTextEntry1] : CAD - s/p LAD stent without sx\par \par HTN- stable\par \par Lipids- controlled\par \par Followup in 3 mths\par \par Cont MEDS\par \par Time spent reviewing history, cath films, exam, pt discussion and note writing IV intact/Admission wristband placed

## 2024-04-19 ENCOUNTER — NON-APPOINTMENT (OUTPATIENT)
Age: 74
End: 2024-04-19

## 2024-04-22 ENCOUNTER — NON-APPOINTMENT (OUTPATIENT)
Age: 74
End: 2024-04-22

## 2024-05-01 ENCOUNTER — NON-APPOINTMENT (OUTPATIENT)
Age: 74
End: 2024-05-01

## 2024-05-09 ENCOUNTER — APPOINTMENT (OUTPATIENT)
Dept: PULMONOLOGY | Facility: CLINIC | Age: 74
End: 2024-05-09
Payer: MEDICARE

## 2024-05-09 VITALS
RESPIRATION RATE: 16 BRPM | WEIGHT: 196 LBS | TEMPERATURE: 98 F | OXYGEN SATURATION: 96 % | DIASTOLIC BLOOD PRESSURE: 74 MMHG | HEART RATE: 62 BPM | BODY MASS INDEX: 25.15 KG/M2 | SYSTOLIC BLOOD PRESSURE: 132 MMHG | HEIGHT: 74 IN

## 2024-05-09 PROCEDURE — 99214 OFFICE O/P EST MOD 30 MIN: CPT

## 2024-05-09 NOTE — HISTORY OF PRESENT ILLNESS
[Former] : former [TextBox_4] : This letter  is regarding your patient  who  attended pulmonary out patient office today.  I have reviewed  patient's  past history, social history, family history and medication list. I also  reviewed nurse practitioners/ and fellows  notes and assessment and agree with it.   The patient was referred by   73 year old male with history of hypertension, hyperlipidemia, CAD post cardiac stents, former 30 pack year smoker who quit in 1989, with IPF ( positive Envisia testing by bronchoscopy) now on Ofev since May, initially tolerated well. Ofev held and then reduced to 100mg daily with improvement in BM.Seen on 9/28 with report of increasing loose BMS and no other symptoms. Weight was stable. now on ofev 100mg bid   ------No history of , fever, chills , rigors, chest pain, or hemoptysis. Questionable history of Raynaud's phenomenon. No h/o significant weight loss in last few months. No history of liver dysfunction , collagen vascular disorder or chronic thromboembolic disease. I would classify the patient's dyspnea as WHO  FUNCTIONAL CLASS II--------  ----Echo  date------ ----Pft date----PFTs show normal spirometry and lung volumes. DLCO is mildly reduced----- ----Ct scan date--jan 2023  IMPRESSION: interstitial lung disease ----, similar to September 2022 and increased compared to March 2018. Unchanged 9 mm left upper lobe nodule since March 2018.-----   nov 2023---ild   unchanged  from  jan 2023 11/2023- Pt hx of ILD and BHARATHI compliant on CPAP. Pt previously on Ofev dropped down to 100mg PO to decrease GI symptoms but has been off of medication for 2 weeks due to intolerable diarrhea and weight loss. Pt reports no respiratory complaints, occasional cough, up to date on vaccinations. -----We will obtain MYOMARKER  panel --- discussed with the patient about getting enrolled in and he will remodel and study for UIP pattern ILD  12/7/2023- Last visit he was given 2 weeks of Prednisone and information about Tyvasso trial as he was not tolerating Ofev. He states that he has unlimited walking capacity at a steady pace. He denies fevers, chills, or shortness of breath. He states that he does have a cough productive at times of whitish sputum.---Patient has been enrolled into TETON study today. Consent was signed today. All questions have been answered. Screening visit done today----spirometry and labs were done today ---will contd the low dose pfev of 100mg po bid- - - - -   3/26/2024---ILD and BHARATHI complaint on CPAP. pt will continue Ofev 100 mg BID--- patient is tolerating the GI symptoms----as per sponsor eligibilty report for HRCT done on 11/26/23, reviewer assessmnet was Probable UIP therefore, patient is re-enrolled into the TETON study today----Consent was signed today. All questions have been answered. Screening visit was done today. Spirometry and labs done today  FEV1: 3.44, FVC: 4.06, FEV1/FVC: .85  4/11/2024---ILD and BHARATHI complaint on CPAP---pt will continue Ofec 100 mg BID--- patient is tolerating the GI symptoms---patient is re-enrolled into the TETON study ----Patient is here for baseline visit today. Spirometry, DLCO, labs, EKG was done on the office. Patient took first in-clinic today----started 3 breaths QID----patient was observed for an hour and did not have any symptoms/side effects----all questions have been answered.  FEV1:3.27, FVC: 3.86, FEV1/FVC: .85  MAY 2024------ILD and BHARATHI complaint on CPAP---pt will continue Ofec 100 mg BID--- patient is tolerating the GI symptoms---patient is enrolled into the TETON study ----Spirometry, DLCO, labs, EKG was done on the office. ---  INCREASED to 4 breaths QID----patient was observed for an hour and did not have any symptoms/side effects----all questions have been answered.  FEV1: 3.37, FVC: 3.91, FEV1/FVC: .86

## 2024-05-15 ENCOUNTER — APPOINTMENT (OUTPATIENT)
Dept: CARDIOLOGY | Facility: CLINIC | Age: 74
End: 2024-05-15

## 2024-05-15 VITALS
SYSTOLIC BLOOD PRESSURE: 134 MMHG | DIASTOLIC BLOOD PRESSURE: 76 MMHG | WEIGHT: 196 LBS | HEART RATE: 60 BPM | OXYGEN SATURATION: 98 % | BODY MASS INDEX: 25.17 KG/M2

## 2024-05-15 PROCEDURE — G2211 COMPLEX E/M VISIT ADD ON: CPT

## 2024-05-15 PROCEDURE — 93000 ELECTROCARDIOGRAM COMPLETE: CPT

## 2024-05-15 PROCEDURE — 99214 OFFICE O/P EST MOD 30 MIN: CPT

## 2024-05-17 ENCOUNTER — NON-APPOINTMENT (OUTPATIENT)
Age: 74
End: 2024-05-17

## 2024-05-17 RX ORDER — NINTEDANIB 100 MG/1
100 CAPSULE ORAL TWICE DAILY
Qty: 60 | Refills: 3 | Status: ACTIVE | COMMUNITY
Start: 2023-07-14 | End: 1900-01-01

## 2024-05-31 ENCOUNTER — NON-APPOINTMENT (OUTPATIENT)
Age: 74
End: 2024-05-31

## 2024-06-03 ENCOUNTER — NON-APPOINTMENT (OUTPATIENT)
Age: 74
End: 2024-06-03

## 2024-06-06 ENCOUNTER — APPOINTMENT (OUTPATIENT)
Dept: PULMONOLOGY | Facility: CLINIC | Age: 74
End: 2024-06-06
Payer: MEDICARE

## 2024-06-06 VITALS
WEIGHT: 197 LBS | HEIGHT: 74 IN | HEART RATE: 59 BPM | RESPIRATION RATE: 15 BRPM | SYSTOLIC BLOOD PRESSURE: 125 MMHG | BODY MASS INDEX: 25.28 KG/M2 | TEMPERATURE: 98 F | OXYGEN SATURATION: 98 % | DIASTOLIC BLOOD PRESSURE: 71 MMHG

## 2024-06-06 PROCEDURE — 36415 COLL VENOUS BLD VENIPUNCTURE: CPT

## 2024-06-06 PROCEDURE — 99214 OFFICE O/P EST MOD 30 MIN: CPT

## 2024-06-06 NOTE — END OF VISIT
[FreeTextEntry3] :   I, Dr. Casa Bañuelos  personally performed the evaluation and management (E/M) services for this established patient who presents today with (a) new problem(s)/exacerbation of (an) existing condition(s). That E/M includes conducting the clinically appropriate interval history &/or exam, assessing all new/exacerbated conditions, and establishing a new plan of care. Today, my NAYLA, Zayda Foster NP, , was here to observe my evaluation and management service for this new problem/exacerbated condition and follow the plan of care established by me going forward. [Time Spent: ___ minutes] : I have spent [unfilled] minutes of time on the encounter.

## 2024-06-06 NOTE — HISTORY OF PRESENT ILLNESS
[Former] : former [TextBox_4] : This letter  is regarding your patient  who  attended pulmonary out patient office today.  I have reviewed  patient's  past history, social history, family history and medication list. I also  reviewed nurse practitioners/ and fellows  notes and assessment and agree with it.   The patient was referred by   73 year old male with history of hypertension, hyperlipidemia, CAD post cardiac stents, former 30 pack year smoker who quit in 1989, with IPF ( positive Envisia testing by bronchoscopy) now on Ofev since May, initially tolerated well. Ofev held and then reduced to 100mg daily with improvement in BM.Seen on 9/28 with report of increasing loose BMS and no other symptoms. Weight was stable. now on ofev 100mg bid   ------No history of , fever, chills , rigors, chest pain, or hemoptysis. Questionable history of Raynaud's phenomenon. No h/o significant weight loss in last few months. No history of liver dysfunction , collagen vascular disorder or chronic thromboembolic disease. I would classify the patient's dyspnea as WHO  FUNCTIONAL CLASS II--------  ----Echo  date------ ----Pft date----PFTs show normal spirometry and lung volumes. DLCO is mildly reduced----- ----Ct scan date--jan 2023  IMPRESSION: interstitial lung disease ----, similar to September 2022 and increased compared to March 2018. Unchanged 9 mm left upper lobe nodule since March 2018.-----   nov 2023---ild   unchanged  from  jan 2023 11/2023- Pt hx of ILD and BHARATHI compliant on CPAP. Pt previously on Ofev dropped down to 100mg PO to decrease GI symptoms but has been off of medication for 2 weeks due to intolerable diarrhea and weight loss. Pt reports no respiratory complaints, occasional cough, up to date on vaccinations. -----We will obtain MYOMARKER  panel --- discussed with the patient about getting enrolled in and he will remodel and study for UIP pattern ILD  12/7/2023- Last visit he was given 2 weeks of Prednisone and information about Tyvasso trial as he was not tolerating Ofev. He states that he has unlimited walking capacity at a steady pace. He denies fevers, chills, or shortness of breath. He states that he does have a cough productive at times of whitish sputum.---Patient has been enrolled into TETON study today. Consent was signed today. All questions have been answered. Screening visit done today----spirometry and labs were done today ---will contd the low dose pfev of 100mg po bid- - - - -   3/26/2024---ILD and BHARATHI complaint on CPAP. pt will continue Ofev 100 mg BID--- patient is tolerating the GI symptoms----as per sponsor eligibilty report for HRCT done on 11/26/23, reviewer assessmnet was Probable UIP therefore, patient is re-enrolled into the TETON study today----Consent was signed today. All questions have been answered. Screening visit was done today. Spirometry and labs done today  FEV1: 3.44, FVC: 4.06, FEV1/FVC: .85  4/11/2024---ILD and BHARATHI complaint on CPAP---pt will continue Ofec 100 mg BID--- patient is tolerating the GI symptoms---patient is re-enrolled into the TETON study ----Patient is here for baseline visit today. Spirometry, DLCO, labs, EKG was done on the office. Patient took first in-clinic today----started 3 breaths QID----patient was observed for an hour and did not have any symptoms/side effects----all questions have been answered.  FEV1:3.27, FVC: 3.86, FEV1/FVC: .85  MAY 2024------ILD and BHARATHI complaint on CPAP---pt will continue Ofec 100 mg BID--- patient is tolerating the GI symptoms---patient is enrolled into the TETON study ----Spirometry, DLCO, labs, EKG was done on the office. ---  INCREASED to 4 breaths QID----patient was observed for an hour and did not have any symptoms/side effects----all questions have been answered.  FEV1: 3.37, FVC: 3.91, FEV1/FVC: .86  June 2024 ILD and BHARATHI- complaint on CPAP----pt is on ofev 100mg BID ------tolerating it well with the GI symptoms-----prednisone 7.5 mg-----patient is enrolled in the [place] study--- Spirometry and labs are done in the office today----- will INCREASE to 6 breaths QID-----

## 2024-06-06 NOTE — DISCUSSION/SUMMARY
[FreeTextEntry1] : ---Assessment plan----------The patient has been referred here for further opinion regarding pulmonary problem, 73 year old male with history of hypertension, hyperlipidemia, CAD post cardiac stents, former 30 pack year smoker who quit in 1989, with IPF ( positive Envisia testing by bronchoscopy) now on Ofev since May, initially tolerated well. NOW  Ofev held DUE TO GI SYMPTOMS--- .  1. ILD -----UIP PATTERN--NOT TOLERATING high   OFEV---   CONDT LOW DOSE OFEV 100MG PO BID--ALSO ON LOW DOSE PREDNISSONE 7.5 MG PO QD----patient ON  inhaled REMODULIN TRIAL [TETON TRIAL ]  trial for patients with UIP ---INCREASE TO 4 BREATHS 6 TIMES DAILY---. Spirometry,---------- 2. HOME EXECRCISES  3. follow up in 2 month   .Thanks for allowing  me to participate  in the care of this patient.  Patient at this time  will follow  the above mentioned recommendations and return back for follow up visit. If you have any questions  I can be reached  at # [phone] (office). Casa Bañuelos MD, FCCP  Pulmonary, Critical Care and Sleep Medicine

## 2024-06-10 LAB — 25(OH)D3 SERPL-MCNC: 28.6 NG/ML

## 2024-06-14 ENCOUNTER — NON-APPOINTMENT (OUTPATIENT)
Age: 74
End: 2024-06-14

## 2024-06-15 NOTE — H&P PST ADULT - NS HP PST ANES REACTION
Onset:  4 days ago. Seen in clinic few days ago. US done  Location/Description:  vaginal bleeding with Nausea  Precipitating Factors:  vaginal bleeding w/ Nausea w. Mild vaginal bleeding  Pain Scale (1-10), 10 highest:  0  Associated Symptoms:  Vaginal bleeding  LMP: Patient's last menstrual period was 2024 (exact date).  EDC:  Dec 18th 2024  Gestational Age:  13 weeks   Blood Type: A  OB History:   OB History    Para Term  AB Living   2 1 1 0 0 1   SAB IAB Ectopic Molar Multiple Live Births   0 0 0 0 0 1           Vaginal/C Section:  Vaginal   Group B Strep (pos or neg):  unsure  History of previous Labor & Delivery:  normal vaginal delivery  Recent Care:  2024      OB:Dr. Lindsey Bassett  Place of deliver and eta:  Reason for Disposition  • MILD vaginal bleeding (i.e., less than 1 pad / hour; less than patient's usual menstrual bleeding; not just spotting)    Protocols used: Pregnancy - Vaginal Bleeding Less Than 20 Weeks EGA-A-    
Reason for call: 2. Vaginal bleeding 13 weeks pregnant, post ER and office visit. US done    Onset: 4 days ago    Location / description / Associated Symptoms: vaginal bleeding    Precipitating Factors: Mild vaginal bleeding today    Pain Scale (1-10), 10 highest: 0/10    What improves/worsens symptoms: rest helps    Symptom specific medications: none    LMP : Patient's last menstrual period was 2024 (exact date).    Are you pregnant or breast feeding: yes     Recent Care: Aug 29th 2023    Did the patient have a positive coronavirus screening?: No    PLAN:72 hour disposition, call prn, ER with worsening symptoms, verbalized understanding. Caller connected w/ scheduling for follow up appt. Instructions given  Provider's office  See Provider within next few days    Patient/Caller agrees to follow recommendations.  
Regarding: [place] F 33-13 weeks pregnant with vaginal bleeding(brownish red in color)  ----- Message from Rebecca Lopez sent at 6/15/2024  7:05 AM CDT -----  Patient Name: Cortney Castaneda    Specialist or PCP Name: Hermila Young MD     Symptoms: 13 weeks pregnant with vaginal bleeding(brownish red in color)    Pregnant (females aged 13-60. If Yes, how long?) : 13 weeks    Call Back # : [phone]    Which State are you currently located in?: [place]    Name of Clinic Site / Acct# : [facility] ([place]) - [address]    
No

## 2024-06-20 ENCOUNTER — NON-APPOINTMENT (OUTPATIENT)
Age: 74
End: 2024-06-20

## 2024-06-27 ENCOUNTER — NON-APPOINTMENT (OUTPATIENT)
Age: 74
End: 2024-06-27

## 2024-07-12 ENCOUNTER — NON-APPOINTMENT (OUTPATIENT)
Age: 74
End: 2024-07-12

## 2024-07-19 ENCOUNTER — NON-APPOINTMENT (OUTPATIENT)
Age: 74
End: 2024-07-19

## 2024-07-26 ENCOUNTER — RX RENEWAL (OUTPATIENT)
Age: 74
End: 2024-07-26

## 2024-07-26 ENCOUNTER — NON-APPOINTMENT (OUTPATIENT)
Age: 74
End: 2024-07-26

## 2024-07-29 ENCOUNTER — APPOINTMENT (OUTPATIENT)
Dept: PULMONOLOGY | Facility: CLINIC | Age: 74
End: 2024-07-29
Payer: MEDICARE

## 2024-07-29 VITALS
TEMPERATURE: 98 F | RESPIRATION RATE: 15 BRPM | BODY MASS INDEX: 25.67 KG/M2 | DIASTOLIC BLOOD PRESSURE: 51 MMHG | WEIGHT: 200 LBS | HEART RATE: 61 BPM | HEIGHT: 74 IN | OXYGEN SATURATION: 97 % | SYSTOLIC BLOOD PRESSURE: 151 MMHG

## 2024-07-29 PROCEDURE — 99214 OFFICE O/P EST MOD 30 MIN: CPT

## 2024-07-29 NOTE — DISCUSSION/SUMMARY
[FreeTextEntry1] : ---Assessment plan----------The patient has been referred here for further opinion regarding pulmonary problem, 73 year old male with history of hypertension, hyperlipidemia, CAD post cardiac stents, former 30 pack year smoker who quit in 1989, with IPF ( positive Envisia testing by bronchoscopy) now on Ofev since May, initially tolerated well. NOW  Ofev held DUE TO GI SYMPTOMS--- .  1. ILD -----UIP PATTERN--NOT TOLERATING high   OFEV---   CONDT LOW DOSE OFEV 100MG PO BID--ALSO ON LOW DOSE PREDNISSONE 7.5 MG PO QD----patient ON  inhaled REMODULIN TRIAL [TETON TRIAL ]  trial for patients with UIP --- 8 BREATHS QID ---. Spirometry, and labs done today for week 16 visit  2. HOME EXECRCISES  3. follow up in 2 month   .Thanks for allowing  me to participate  in the care of this patient.  Patient at this time  will follow  the above mentioned recommendations and return back for follow up visit. If you have any questions  I can be reached  at # [phone] (office). Casa Bañuelos MD, FCCP  Pulmonary, Critical Care and Sleep Medicine

## 2024-07-29 NOTE — DISCUSSION/SUMMARY
[FreeTextEntry1] : ---Assessment plan----------The patient has been referred here for further opinion regarding pulmonary problem, 73 year old male with history of hypertension, hyperlipidemia, CAD post cardiac stents, former 30 pack year smoker who quit in 1989, with IPF ( positive Envisia testing by bronchoscopy) now on Ofev since May, initially tolerated well. NOW  Ofev held DUE TO GI SYMPTOMS--- .  1. ILD -----UIP PATTERN--NOT TOLERATING high   OFEV---   CONDT LOW DOSE OFEV 100MG PO BID--ALSO ON LOW DOSE PREDNISSONE 7.5 MG PO QD----patient ON  inhaled REMODULIN TRIAL [TETON TRIAL ]  trial for patients with UIP --- 8 BREATHS QID ---. Spirometry, and labs done today for week 16 visit  2. HOME EXECRCISES  3. follow up in 2 month   .Thanks for allowing  me to participate  in the care of this patient.  Patient at this time  will follow  the above mentioned recommendations and return back for follow up visit. If you have any questions  I can be reached  at # [phone] (office). Casa Bañuelos MD, FCCP  Pulmonary, Critical Care and Sleep Medicine

## 2024-07-29 NOTE — HISTORY OF PRESENT ILLNESS
[Former] : former [TextBox_4] : This letter  is regarding your patient  who  attended pulmonary out patient office today.  I have reviewed  patient's  past history, social history, family history and medication list. I also  reviewed nurse practitioners/ and fellows  notes and assessment and agree with it.   The patient was referred by   73 year old male with history of hypertension, hyperlipidemia, CAD post cardiac stents, former 30 pack year smoker who quit in 1989, with IPF ( positive Envisia testing by bronchoscopy) now on Ofev since May, initially tolerated well. Ofev held and then reduced to 100mg daily with improvement in BM.Seen on 9/28 with report of increasing loose BMS and no other symptoms. Weight was stable. now on ofev 100mg bid   ------No history of , fever, chills , rigors, chest pain, or hemoptysis. Questionable history of Raynaud's phenomenon. No h/o significant weight loss in last few months. No history of liver dysfunction , collagen vascular disorder or chronic thromboembolic disease. I would classify the patient's dyspnea as WHO  FUNCTIONAL CLASS II--------  ----Echo  date------ ----Pft date----PFTs show normal spirometry and lung volumes. DLCO is mildly reduced----- ----Ct scan date--jan 2023  IMPRESSION: interstitial lung disease ----, similar to September 2022 and increased compared to March 2018. Unchanged 9 mm left upper lobe nodule since March 2018.-----   nov 2023---ild   unchanged  from  jan 2023 11/2023- Pt hx of ILD and BHARATHI compliant on CPAP. Pt previously on Ofev dropped down to 100mg PO to decrease GI symptoms but has been off of medication for 2 weeks due to intolerable diarrhea and weight loss. Pt reports no respiratory complaints, occasional cough, up to date on vaccinations. -----We will obtain MYOMARKER  panel --- discussed with the patient about getting enrolled in and he will remodel and study for UIP pattern ILD  12/7/2023- Last visit he was given 2 weeks of Prednisone and information about Tyvasso trial as he was not tolerating Ofev. He states that he has unlimited walking capacity at a steady pace. He denies fevers, chills, or shortness of breath. He states that he does have a cough productive at times of whitish sputum.---Patient has been enrolled into TETON study today. Consent was signed today. All questions have been answered. Screening visit done today----spirometry and labs were done today ---will contd the low dose pfev of 100mg po bid- - - - -   3/26/2024---ILD and BHARATHI complaint on CPAP. pt will continue Ofev 100 mg BID--- patient is tolerating the GI symptoms----as per sponsor eligibilty report for HRCT done on 11/26/23, reviewer assessmnet was Probable UIP therefore, patient is re-enrolled into the TETON study today----Consent was signed today. All questions have been answered. Screening visit was done today. Spirometry and labs done today  FEV1: 3.44, FVC: 4.06, FEV1/FVC: .85  4/11/2024---ILD and BHARATHI complaint on CPAP---pt will continue Ofec 100 mg BID--- patient is tolerating the GI symptoms---patient is re-enrolled into the TETON study ----Patient is here for baseline visit today. Spirometry, DLCO, labs, EKG was done on the office. Patient took first in-clinic today----started 3 breaths QID----patient was observed for an hour and did not have any symptoms/side effects----all questions have been answered.  FEV1:3.27, FVC: 3.86, FEV1/FVC: .85  MAY 2024------ILD and BHARATHI complaint on CPAP---pt will continue Ofec 100 mg BID--- patient is tolerating the GI symptoms---patient is enrolled into the TETON study ----Spirometry, DLCO, labs, EKG was done on the office. ---  INCREASED to 4 breaths QID----patient was observed for an hour and did not have any symptoms/side effects----all questions have been answered.  FEV1: 3.37, FVC: 3.91, FEV1/FVC: .86  June 2024 ILD and BHARATHI- complaint on CPAP----pt is on ofev 100mg BID ------tolerating it well with the GI symptoms-----prednisone 7.5 mg-----patient is enrolled in the TETON study--- Spirometry and labs are done in the office today----- will INCREASE to 6 breaths QID-----  July 2024- ILD and BHARATHI--compliant with CPAP----Pt is on ofev 100 mg BID-----GI are better- Prednisone 7.5 MG---- Patient is here for week 16 visit today for [place] study---- spirometry and labs done today in the office. ---currently on 8 breaths QID for the study  FEV1; 3.31, FVC: 3.8 Fev1/FVC:.86

## 2024-07-29 NOTE — HISTORY OF PRESENT ILLNESS
[Former] : former [TextBox_4] : This letter  is regarding your patient  who  attended pulmonary out patient office today.  I have reviewed  patient's  past history, social history, family history and medication list. I also  reviewed nurse practitioners/ and fellows  notes and assessment and agree with it.   The patient was referred by   73 year old male with history of hypertension, hyperlipidemia, CAD post cardiac stents, former 30 pack year smoker who quit in 1989, with IPF ( positive Envisia testing by bronchoscopy) now on Ofev since May, initially tolerated well. Ofev held and then reduced to 100mg daily with improvement in BM.Seen on 9/28 with report of increasing loose BMS and no other symptoms. Weight was stable. now on ofev 100mg bid   ------No history of , fever, chills , rigors, chest pain, or hemoptysis. Questionable history of Raynaud's phenomenon. No h/o significant weight loss in last few months. No history of liver dysfunction , collagen vascular disorder or chronic thromboembolic disease. I would classify the patient's dyspnea as WHO  FUNCTIONAL CLASS II--------  ----Echo  date------ ----Pft date----PFTs show normal spirometry and lung volumes. DLCO is mildly reduced----- ----Ct scan date--jan 2023  IMPRESSION: interstitial lung disease ----, similar to September 2022 and increased compared to March 2018. Unchanged 9 mm left upper lobe nodule since March 2018.-----   nov 2023---ild   unchanged  from  jan 2023 11/2023- Pt hx of ILD and BHARATHI compliant on CPAP. Pt previously on Ofev dropped down to 100mg PO to decrease GI symptoms but has been off of medication for 2 weeks due to intolerable diarrhea and weight loss. Pt reports no respiratory complaints, occasional cough, up to date on vaccinations. -----We will obtain MYOMARKER  panel --- discussed with the patient about getting enrolled in and he will remodel and study for UIP pattern ILD  12/7/2023- Last visit he was given 2 weeks of Prednisone and information about Tyvasso trial as he was not tolerating Ofev. He states that he has unlimited walking capacity at a steady pace. He denies fevers, chills, or shortness of breath. He states that he does have a cough productive at times of whitish sputum.---Patient has been enrolled into TETON study today. Consent was signed today. All questions have been answered. Screening visit done today----spirometry and labs were done today ---will contd the low dose pfev of 100mg po bid- - - - -   3/26/2024---ILD and BHARATHI complaint on CPAP. pt will continue Ofev 100 mg BID--- patient is tolerating the GI symptoms----as per sponsor eligibilty report for HRCT done on 11/26/23, reviewer assessmnet was Probable UIP therefore, patient is re-enrolled into the TETON study today----Consent was signed today. All questions have been answered. Screening visit was done today. Spirometry and labs done today  FEV1: 3.44, FVC: 4.06, FEV1/FVC: .85  4/11/2024---ILD and BHARATHI complaint on CPAP---pt will continue Ofec 100 mg BID--- patient is tolerating the GI symptoms---patient is re-enrolled into the TETON study ----Patient is here for baseline visit today. Spirometry, DLCO, labs, EKG was done on the office. Patient took first in-clinic today----started 3 breaths QID----patient was observed for an hour and did not have any symptoms/side effects----all questions have been answered.  FEV1:3.27, FVC: 3.86, FEV1/FVC: .85  MAY 2024------ILD and BHARATHI complaint on CPAP---pt will continue Ofec 100 mg BID--- patient is tolerating the GI symptoms---patient is enrolled into the TETON study ----Spirometry, DLCO, labs, EKG was done on the office. ---  INCREASED to 4 breaths QID----patient was observed for an hour and did not have any symptoms/side effects----all questions have been answered.  FEV1: 3.37, FVC: 3.91, FEV1/FVC: .86  June 2024 ILD and BHARATHI- complaint on CPAP----pt is on ofev 100mg BID ------tolerating it well with the GI symptoms-----prednisone 7.5 mg-----patient is enrolled in the TETON study--- Spirometry and labs are done in the office today----- will INCREASE to 6 breaths QID-----  July 2024- ILD and BHARATHI--compliant with CPAP----Pt is on ofev 100 mg BID-----GI are better- Prednisone 7.5 MG---- Patient is here for week 16 visit today for [place] study---- spirometry and labs done today in the office. ---currently on 8 breaths QID for the study  FEV1; 3.31, FVC: 3.8 Fev1/FVC:.86

## 2024-07-29 NOTE — DISCUSSION/SUMMARY
[FreeTextEntry1] : ---Assessment plan----------The patient has been referred here for further opinion regarding pulmonary problem, 73 year old male with history of hypertension, hyperlipidemia, CAD post cardiac stents, former 30 pack year smoker who quit in 1989, with IPF ( positive Envisia testing by bronchoscopy) now on Ofev since May, initially tolerated well. NOW  Ofev held DUE TO GI SYMPTOMS--- .  1. ILD -----UIP PATTERN--NOT TOLERATING high   OFEV---   CONDT LOW DOSE OFEV 100MG PO BID--ALSO ON LOW DOSE PREDNISSONE 7.5 MG PO QD----patient ON  inhaled REMODULIN TRIAL [TETON TRIAL ]  trial for patients with UIP --- 8 BREATHS QID ---. Spirometry, and labs done today for week 16 visit  2. HOME EXECRCISES  3. follow up in 2 month   .Thanks for allowing  me to participate  in the care of this patient.  Patient at this time  will follow  the above mentioned recommendations and return back for follow up visit. If you have any questions  I can be reached  at # [phone] (office). Casa Bañuelos MD, FCCP  Pulmonary, Critical Care and Sleep Medicine

## 2024-07-29 NOTE — HISTORY OF PRESENT ILLNESS
[Former] : former [TextBox_4] : This letter  is regarding your patient  who  attended pulmonary out patient office today.  I have reviewed  patient's  past history, social history, family history and medication list. I also  reviewed nurse practitioners/ and fellows  notes and assessment and agree with it.   The patient was referred by   73 year old male with history of hypertension, hyperlipidemia, CAD post cardiac stents, former 30 pack year smoker who quit in 1989, with IPF ( positive Envisia testing by bronchoscopy) now on Ofev since May, initially tolerated well. Ofev held and then reduced to 100mg daily with improvement in BM.Seen on 9/28 with report of increasing loose BMS and no other symptoms. Weight was stable. now on ofev 100mg bid   ------No history of , fever, chills , rigors, chest pain, or hemoptysis. Questionable history of Raynaud's phenomenon. No h/o significant weight loss in last few months. No history of liver dysfunction , collagen vascular disorder or chronic thromboembolic disease. I would classify the patient's dyspnea as WHO  FUNCTIONAL CLASS II--------  ----Echo  date------ ----Pft date----PFTs show normal spirometry and lung volumes. DLCO is mildly reduced----- ----Ct scan date--jan 2023  IMPRESSION: interstitial lung disease ----, similar to September 2022 and increased compared to March 2018. Unchanged 9 mm left upper lobe nodule since March 2018.-----   nov 2023---ild   unchanged  from  jan 2023 11/2023- Pt hx of ILD and BHARATHI compliant on CPAP. Pt previously on Ofev dropped down to 100mg PO to decrease GI symptoms but has been off of medication for 2 weeks due to intolerable diarrhea and weight loss. Pt reports no respiratory complaints, occasional cough, up to date on vaccinations. -----We will obtain MYOMARKER  panel --- discussed with the patient about getting enrolled in and he will remodel and study for UIP pattern ILD  12/7/2023- Last visit he was given 2 weeks of Prednisone and information about Tyvasso trial as he was not tolerating Ofev. He states that he has unlimited walking capacity at a steady pace. He denies fevers, chills, or shortness of breath. He states that he does have a cough productive at times of whitish sputum.---Patient has been enrolled into TETON study today. Consent was signed today. All questions have been answered. Screening visit done today----spirometry and labs were done today ---will contd the low dose pfev of 100mg po bid- - - - -   3/26/2024---ILD and BHARATHI complaint on CPAP. pt will continue Ofev 100 mg BID--- patient is tolerating the GI symptoms----as per sponsor eligibilty report for HRCT done on 11/26/23, reviewer assessmnet was Probable UIP therefore, patient is re-enrolled into the TETON study today----Consent was signed today. All questions have been answered. Screening visit was done today. Spirometry and labs done today  FEV1: 3.44, FVC: 4.06, FEV1/FVC: .85  4/11/2024---ILD and BHARATHI complaint on CPAP---pt will continue Ofec 100 mg BID--- patient is tolerating the GI symptoms---patient is re-enrolled into the TETON study ----Patient is here for baseline visit today. Spirometry, DLCO, labs, EKG was done on the office. Patient took first in-clinic today----started 3 breaths QID----patient was observed for an hour and did not have any symptoms/side effects----all questions have been answered.  FEV1:3.27, FVC: 3.86, FEV1/FVC: .85  MAY 2024------ILD and BHARATHI complaint on CPAP---pt will continue Ofec 100 mg BID--- patient is tolerating the GI symptoms---patient is enrolled into the TETON study ----Spirometry, DLCO, labs, EKG was done on the office. ---  INCREASED to 4 breaths QID----patient was observed for an hour and did not have any symptoms/side effects----all questions have been answered.  FEV1: 3.37, FVC: 3.91, FEV1/FVC: .86  June 2024 ILD and BHARATHI- complaint on CPAP----pt is on ofev 100mg BID ------tolerating it well with the GI symptoms-----prednisone 7.5 mg-----patient is enrolled in the TETON study--- Spirometry and labs are done in the office today----- will INCREASE to 6 breaths QID-----  July 2024- ILD and BHARATHI--compliant with CPAP----Pt is on ofev 100 mg BID-----GI are better- Prednisone 7.5 MG---- Patient is here for week 16 visit today for [place] study---- spirometry and labs done today in the office. ---currently on 8 breaths QID for the study  FEV1; 3.31, FVC: 3.8 Fev1/FVC:.86

## 2024-08-21 ENCOUNTER — NON-APPOINTMENT (OUTPATIENT)
Age: 74
End: 2024-08-21

## 2024-09-23 ENCOUNTER — APPOINTMENT (OUTPATIENT)
Dept: PULMONOLOGY | Facility: CLINIC | Age: 74
End: 2024-09-23
Payer: MEDICARE

## 2024-09-23 VITALS
SYSTOLIC BLOOD PRESSURE: 134 MMHG | HEART RATE: 59 BPM | WEIGHT: 196.31 LBS | HEIGHT: 74 IN | RESPIRATION RATE: 15 BRPM | DIASTOLIC BLOOD PRESSURE: 73 MMHG | OXYGEN SATURATION: 96 % | TEMPERATURE: 97.7 F | BODY MASS INDEX: 25.19 KG/M2

## 2024-09-23 PROCEDURE — 99214 OFFICE O/P EST MOD 30 MIN: CPT

## 2024-09-23 NOTE — HISTORY OF PRESENT ILLNESS
[Former] : former [TextBox_4] : This letter  is regarding your patient  who  attended pulmonary out patient office today.  I have reviewed  patient's  past history, social history, family history and medication list. I also  reviewed nurse practitioners/ and fellows  notes and assessment and agree with it.   The patient was referred by   73 year old male with history of hypertension, hyperlipidemia, CAD post cardiac stents, former 30 pack year smoker who quit in 1989, with IPF ( positive Envisia testing by bronchoscopy) now on Ofev since May, initially tolerated well. Ofev held and then reduced to 100mg daily with improvement in BM.Seen on 9/28 with report of increasing loose BMS and no other symptoms. Weight was stable. now on ofev 100mg bid   ------No history of , fever, chills , rigors, chest pain, or hemoptysis. Questionable history of Raynaud's phenomenon. No h/o significant weight loss in last few months. No history of liver dysfunction , collagen vascular disorder or chronic thromboembolic disease. I would classify the patient's dyspnea as WHO  FUNCTIONAL CLASS II--------  ----Echo  date------ ----Pft date----PFTs show normal spirometry and lung volumes. DLCO is mildly reduced----- ----Ct scan date--jan 2023  IMPRESSION: interstitial lung disease ----, similar to September 2022 and increased compared to March 2018. Unchanged 9 mm left upper lobe nodule since March 2018.-----   nov 2023---ild   unchanged  from  jan 2023 11/2023- Pt hx of ILD and BHARATHI compliant on CPAP. Pt previously on Ofev dropped down to 100mg PO to decrease GI symptoms but has been off of medication for 2 weeks due to intolerable diarrhea and weight loss. Pt reports no respiratory complaints, occasional cough, up to date on vaccinations. -----We will obtain MYOMARKER  panel --- discussed with the patient about getting enrolled in and he will remodel and study for UIP pattern ILD  12/7/2023- Last visit he was given 2 weeks of Prednisone and information about Tyvasso trial as he was not tolerating Ofev. He states that he has unlimited walking capacity at a steady pace. He denies fevers, chills, or shortness of breath. He states that he does have a cough productive at times of whitish sputum.---Patient has been enrolled into TETON study today. Consent was signed today. All questions have been answered. Screening visit done today----spirometry and labs were done today ---will contd the low dose pfev of 100mg po bid- - - - -   3/26/2024---ILD and BHARATHI complaint on CPAP. pt will continue Ofev 100 mg BID--- patient is tolerating the GI symptoms----as per sponsor eligibilty report for HRCT done on 11/26/23, reviewer assessmnet was Probable UIP therefore, patient is re-enrolled into the TETON study today----Consent was signed today. All questions have been answered. Screening visit was done today. Spirometry and labs done today  FEV1: 3.44, FVC: 4.06, FEV1/FVC: .85  4/11/2024---ILD and BHARATHI complaint on CPAP---pt will continue Ofec 100 mg BID--- patient is tolerating the GI symptoms---patient is re-enrolled into the TETON study ----Patient is here for baseline visit today. Spirometry, DLCO, labs, EKG was done on the office. Patient took first in-clinic today----started 3 breaths QID----patient was observed for an hour and did not have any symptoms/side effects----all questions have been answered.  FEV1:3.27, FVC: 3.86, FEV1/FVC: .85  MAY 2024------ILD and BHARATHI complaint on CPAP---pt will continue Ofec 100 mg BID--- patient is tolerating the GI symptoms---patient is enrolled into the TETON study ----Spirometry, DLCO, labs, EKG was done on the office. ---  INCREASED to 4 breaths QID----patient was observed for an hour and did not have any symptoms/side effects----all questions have been answered.  FEV1: 3.37, FVC: 3.91, FEV1/FVC: .86  June 2024 ILD and BHARATHI- complaint on CPAP----pt is on ofev 100mg BID ------tolerating it well with the GI symptoms-----prednisone 7.5 mg-----patient is enrolled in the TETON study--- Spirometry and labs are done in the office today----- will INCREASE to 6 breaths QID-----  July 2024- ILD and BHARATHI--compliant with CPAP----Pt is on ofev 100 mg BID-----GI are better- Prednisone 7.5 MG---- Patient is here for week 16 visit today for [place] study---- spirometry and labs done today in the office. ---currently on 8 breaths QID for the study  FEV1; 3.31, FVC: 3.8 Fev1/FVC:.86  Sept 2024 ILD and BHARATHI- compliant with CPAP---Pt is on ofev 100 mg BID-----Prednisone 7.5 mg---currently on 9 breaths QID---no pulmonary issues -----no contraindications for the planned procedure for pulm point of view ( eye surgery)

## 2024-09-23 NOTE — DISCUSSION/SUMMARY
[FreeTextEntry1] : ---Assessment plan----------The patient has been referred here for further opinion regarding pulmonary problem, 74 year old male with history of hypertension, hyperlipidemia, CAD post cardiac stents, former 30 pack year smoker who quit in 1989, with IPF ( positive Envisia testing by bronchoscopy) now on Ofev since May, initially tolerated well. NOW  Ofev held DUE TO GI SYMPTOMS--- .  1. ILD -----UIP PATTERN--NOT TOLERATING high   OFEV---   CONDT LOW DOSE OFEV 100MG PO BID--ALSO ON LOW DOSE PREDNISSONE 7.5 MG PO QD-----currently on 9 breaths QID for remodulin drug--[TETEON STUDY] - - -- 2. HOME EXECRCISES  3.  Being considered for cataract surgery-----cleared from pulmonary point of view -------no contraindications for the planned procedure f from  pulm point of view ( eye surgery).  ======  follow up in 1 month   .Thanks for allowing  me to participate  in the care of this patient.  Patient at this time  will follow  the above mentioned recommendations and return back for follow up visit. If you have any questions  I can be reached  at # [phone] (office). Casa Bañuelos MD, [facility]  Pulmonary, Critical Care and Sleep Medicine

## 2024-09-23 NOTE — HISTORY OF PRESENT ILLNESS
[Former] : former [TextBox_4] : This letter  is regarding your patient  who  attended pulmonary out patient office today.  I have reviewed  patient's  past history, social history, family history and medication list. I also  reviewed nurse practitioners/ and fellows  notes and assessment and agree with it.   The patient was referred by   73 year old male with history of hypertension, hyperlipidemia, CAD post cardiac stents, former 30 pack year smoker who quit in 1989, with IPF ( positive Envisia testing by bronchoscopy) now on Ofev since May, initially tolerated well. Ofev held and then reduced to 100mg daily with improvement in BM.Seen on 9/28 with report of increasing loose BMS and no other symptoms. Weight was stable. now on ofev 100mg bid   ------No history of , fever, chills , rigors, chest pain, or hemoptysis. Questionable history of Raynaud's phenomenon. No h/o significant weight loss in last few months. No history of liver dysfunction , collagen vascular disorder or chronic thromboembolic disease. I would classify the patient's dyspnea as WHO  FUNCTIONAL CLASS II--------  ----Echo  date------ ----Pft date----PFTs show normal spirometry and lung volumes. DLCO is mildly reduced----- ----Ct scan date--jan 2023  IMPRESSION: interstitial lung disease ----, similar to September 2022 and increased compared to March 2018. Unchanged 9 mm left upper lobe nodule since March 2018.-----   nov 2023---ild   unchanged  from  jan 2023 11/2023- Pt hx of ILD and BHARATHI compliant on CPAP. Pt previously on Ofev dropped down to 100mg PO to decrease GI symptoms but has been off of medication for 2 weeks due to intolerable diarrhea and weight loss. Pt reports no respiratory complaints, occasional cough, up to date on vaccinations. -----We will obtain MYOMARKER  panel --- discussed with the patient about getting enrolled in and he will remodel and study for UIP pattern ILD  12/7/2023- Last visit he was given 2 weeks of Prednisone and information about Tyvasso trial as he was not tolerating Ofev. He states that he has unlimited walking capacity at a steady pace. He denies fevers, chills, or shortness of breath. He states that he does have a cough productive at times of whitish sputum.---Patient has been enrolled into TETON study today. Consent was signed today. All questions have been answered. Screening visit done today----spirometry and labs were done today ---will contd the low dose pfev of 100mg po bid- - - - -   3/26/2024---ILD and BHARATHI complaint on CPAP. pt will continue Ofev 100 mg BID--- patient is tolerating the GI symptoms----as per sponsor eligibilty report for HRCT done on 11/26/23, reviewer assessmnet was Probable UIP therefore, patient is re-enrolled into the TETON study today----Consent was signed today. All questions have been answered. Screening visit was done today. Spirometry and labs done today  FEV1: 3.44, FVC: 4.06, FEV1/FVC: .85  4/11/2024---ILD and BHARATHI complaint on CPAP---pt will continue Ofec 100 mg BID--- patient is tolerating the GI symptoms---patient is re-enrolled into the TETON study ----Patient is here for baseline visit today. Spirometry, DLCO, labs, EKG was done on the office. Patient took first in-clinic today----started 3 breaths QID----patient was observed for an hour and did not have any symptoms/side effects----all questions have been answered.  FEV1:3.27, FVC: 3.86, FEV1/FVC: .85  MAY 2024------ILD and BHARATHI complaint on CPAP---pt will continue Ofec 100 mg BID--- patient is tolerating the GI symptoms---patient is enrolled into the TETON study ----Spirometry, DLCO, labs, EKG was done on the office. ---  INCREASED to 4 breaths QID----patient was observed for an hour and did not have any symptoms/side effects----all questions have been answered.  FEV1: 3.37, FVC: 3.91, FEV1/FVC: .86  June 2024 ILD and BHARATHI- complaint on CPAP----pt is on ofev 100mg BID ------tolerating it well with the GI symptoms-----prednisone 7.5 mg-----patient is enrolled in the TETON study--- Spirometry and labs are done in the office today----- will INCREASE to 6 breaths QID-----  July 2024- ILD and BHARATHI--compliant with CPAP----Pt is on ofev 100 mg BID-----GI are better- Prednisone 7.5 MG---- Patient is here for week 16 visit today for [place] study---- spirometry and labs done today in the office. ---currently on 8 breaths QID for the study  FEV1; 3.31, FVC: 3.8 Fev1/FVC:.86  Sept 2024 ILD and BHARATHI- compliant with CPAP---Pt is on ofev 100 mg BID-----Prednisone 7.5 mg---currently on 9 breaths QID---no pulmonary issues -----no contraindications for the planned procedure for pulm point of view ( eye surgery)

## 2024-09-23 NOTE — END OF VISIT
[Time Spent: ___ minutes] : I have spent [unfilled] minutes of time on the encounter which excludes teaching and separately reported services. [FreeTextEntry3] :   I, Dr. Casa Bañuelos  personally performed the evaluation and management (E/M) services for this established patient who presents today with (a) new problem(s)/exacerbation of (an) existing condition(s). That E/M includes conducting the clinically appropriate interval history &/or exam, assessing all new/exacerbated conditions, and establishing a new plan of care. Today, my NAYLA, Zayda Foster NP, , was here to observe my evaluation and management service for this new problem/exacerbated condition and follow the plan of care established by me going forward.

## 2024-09-23 NOTE — DISCUSSION/SUMMARY
[FreeTextEntry1] : ---Assessment plan----------The patient has been referred here for further opinion regarding pulmonary problem, 74 year old male with history of hypertension, hyperlipidemia, CAD post cardiac stents, former 30 pack year smoker who quit in 1989, with IPF ( positive Envisia testing by bronchoscopy) now on Ofev since May, initially tolerated well. NOW  Ofev held DUE TO GI SYMPTOMS--- .  1. ILD -----UIP PATTERN--NOT TOLERATING high   OFEV---   CONDT LOW DOSE OFEV 100MG PO BID--ALSO ON LOW DOSE PREDNISSONE 7.5 MG PO QD-----currently on 9 breaths QID for remodulin drug--[TETEON STUDY] - - -- 2. HOME EXECRCISES  3.  Being considered for cataract surgery-----cleared from pulmonary point of view -------no contraindications for the planned procedure f from  pulm point of view ( eye surgery).  ======  follow up in 1 month   .Thanks for allowing  me to participate  in the care of this patient.  Patient at this time  will follow  the above mentioned recommendations and return back for follow up visit. If you have any questions  I can be reached  at # [phone] (office). Casa Bañuelos MD, [facility]  Pulmonary, Critical Care and Sleep Medicine

## 2024-09-23 NOTE — DISCUSSION/SUMMARY
[FreeTextEntry1] : ---Assessment plan----------The patient has been referred here for further opinion regarding pulmonary problem, 74 year old male with history of hypertension, hyperlipidemia, CAD post cardiac stents, former 30 pack year smoker who quit in 1989, with IPF ( positive Envisia testing by bronchoscopy) now on Ofev since May, initially tolerated well. NOW  Ofev held DUE TO GI SYMPTOMS--- .  1. ILD -----UIP PATTERN--NOT TOLERATING high   OFEV---   CONDT LOW DOSE OFEV 100MG PO BID--ALSO ON LOW DOSE PREDNISSONE 7.5 MG PO QD-----currently on 9 breaths QID for remodulin drug--[TETEON STUDY] - - -- 2. HOME EXECRCISES  3.  Being considered for cataract surgery-----cleared from pulmonary point of view -------no contraindications for the planned procedure f from  pulm point of view ( eye surgery).  ======  follow up in 1 month   .Thanks for allowing  me to participate  in the care of this patient.  Patient at this time  will follow  the above mentioned recommendations and return back for follow up visit. If you have any questions  I can be reached  at # [phone] (office). Casa Ba[facility]uelos MD, [facility]  Pulmonary, Critical Care and Sleep Medicine

## 2024-09-23 NOTE — HISTORY OF PRESENT ILLNESS
[Former] : former [TextBox_4] : This letter  is regarding your patient  who  attended pulmonary out patient office today.  I have reviewed  patient's  past history, social history, family history and medication list. I also  reviewed nurse practitioners/ and fellows  notes and assessment and agree with it.   The patient was referred by   73 year old male with history of hypertension, hyperlipidemia, CAD post cardiac stents, former 30 pack year smoker who quit in 1989, with IPF ( positive Envisia testing by bronchoscopy) now on Ofev since May, initially tolerated well. Ofev held and then reduced to 100mg daily with improvement in BM.Seen on 9/28 with report of increasing loose BMS and no other symptoms. Weight was stable. now on ofev 100mg bid   ------No history of , fever, chills , rigors, chest pain, or hemoptysis. Questionable history of Raynaud's phenomenon. No h/o significant weight loss in last few months. No history of liver dysfunction , collagen vascular disorder or chronic thromboembolic disease. I would classify the patient's dyspnea as WHO  FUNCTIONAL CLASS II--------  ----Echo  date------ ----Pft date----PFTs show normal spirometry and lung volumes. DLCO is mildly reduced----- ----Ct scan date--jan 2023  IMPRESSION: interstitial lung disease ----, similar to September 2022 and increased compared to March 2018. Unchanged 9 mm left upper lobe nodule since March 2018.-----   nov 2023---ild   unchanged  from  jan 2023 11/2023- Pt hx of ILD and BHARATHI compliant on CPAP. Pt previously on Ofev dropped down to 100mg PO to decrease GI symptoms but has been off of medication for 2 weeks due to intolerable diarrhea and weight loss. Pt reports no respiratory complaints, occasional cough, up to date on vaccinations. -----We will obtain MYOMARKER  panel --- discussed with the patient about getting enrolled in and he will remodel and study for UIP pattern ILD  12/7/2023- Last visit he was given 2 weeks of Prednisone and information about Tyvasso trial as he was not tolerating Ofev. He states that he has unlimited walking capacity at a steady pace. He denies fevers, chills, or shortness of breath. He states that he does have a cough productive at times of whitish sputum.---Patient has been enrolled into TETON study today. Consent was signed today. All questions have been answered. Screening visit done today----spirometry and labs were done today ---will contd the low dose pfev of 100mg po bid- - - - -   3/26/2024---ILD and BHARATHI complaint on CPAP. pt will continue Ofev 100 mg BID--- patient is tolerating the GI symptoms----as per sponsor eligibilty report for HRCT done on 11/26/23, reviewer assessmnet was Probable UIP therefore, patient is re-enrolled into the TETON study today----Consent was signed today. All questions have been answered. Screening visit was done today. Spirometry and labs done today  FEV1: 3.44, FVC: 4.06, FEV1/FVC: .85  4/11/2024---ILD and BHARATHI complaint on CPAP---pt will continue Ofec 100 mg BID--- patient is tolerating the GI symptoms---patient is re-enrolled into the TETON study ----Patient is here for baseline visit today. Spirometry, DLCO, labs, EKG was done on the office. Patient took first in-clinic today----started 3 breaths QID----patient was observed for an hour and did not have any symptoms/side effects----all questions have been answered.  FEV1:3.27, FVC: 3.86, FEV1/FVC: .85  MAY 2024------ILD and BHARATHI complaint on CPAP---pt will continue Ofec 100 mg BID--- patient is tolerating the GI symptoms---patient is enrolled into the TETON study ----Spirometry, DLCO, labs, EKG was done on the office. ---  INCREASED to 4 breaths QID----patient was observed for an hour and did not have any symptoms/side effects----all questions have been answered.  FEV1: 3.37, FVC: 3.91, FEV1/FVC: .86  June 2024 ILD and BHARATHI- complaint on CPAP----pt is on ofev 100mg BID ------tolerating it well with the GI symptoms-----prednisone 7.5 mg-----patient is enrolled in the TETON study--- Spirometry and labs are done in the office today----- will INCREASE to 6 breaths QID-----  July 2024- ILD and BHARATHI--compliant with CPAP----Pt is on ofev 100 mg BID-----GI are better- Prednisone 7.5 MG---- Patient is here for week 16 visit today for [place] study---- spirometry and labs done today in the office. ---currently on 8 breaths QID for the study  FEV1; 3.31, FVC: 3.8 Fev1/FVC:.86  Sept 2024 ILD and BHARATHI- compliant with CPAP---Pt is on ofev 100 mg BID-----Prednisone 7.5 mg---currently on 9 breaths QID---no pulmonary issues -----no contraindications for the planned procedure for pulm point of view ( eye surgery)

## 2024-10-17 ENCOUNTER — APPOINTMENT (OUTPATIENT)
Dept: PULMONOLOGY | Facility: CLINIC | Age: 74
End: 2024-10-17

## 2024-10-17 VITALS
HEIGHT: 74 IN | SYSTOLIC BLOOD PRESSURE: 123 MMHG | OXYGEN SATURATION: 97 % | TEMPERATURE: 97.3 F | BODY MASS INDEX: 25.15 KG/M2 | WEIGHT: 196 LBS | HEART RATE: 76 BPM | DIASTOLIC BLOOD PRESSURE: 68 MMHG | RESPIRATION RATE: 15 BRPM

## 2024-10-17 DIAGNOSIS — E03.9 HYPOTHYROIDISM, UNSPECIFIED: ICD-10-CM

## 2024-10-17 PROCEDURE — 99214 OFFICE O/P EST MOD 30 MIN: CPT

## 2024-10-18 LAB — TSH SERPL-ACNC: 0.46 UIU/ML

## 2024-11-20 ENCOUNTER — APPOINTMENT (OUTPATIENT)
Dept: CARDIOLOGY | Facility: CLINIC | Age: 74
End: 2024-11-20

## 2024-11-27 ENCOUNTER — NON-APPOINTMENT (OUTPATIENT)
Age: 74
End: 2024-11-27

## 2024-12-19 ENCOUNTER — NON-APPOINTMENT (OUTPATIENT)
Age: 74
End: 2024-12-19

## 2024-12-23 ENCOUNTER — RX RENEWAL (OUTPATIENT)
Age: 74
End: 2024-12-23

## 2024-12-30 ENCOUNTER — APPOINTMENT (OUTPATIENT)
Dept: CARDIOLOGY | Facility: CLINIC | Age: 74
End: 2024-12-30

## 2024-12-30 ENCOUNTER — RX RENEWAL (OUTPATIENT)
Age: 74
End: 2024-12-30

## 2024-12-30 ENCOUNTER — NON-APPOINTMENT (OUTPATIENT)
Age: 74
End: 2024-12-30

## 2024-12-30 VITALS
HEART RATE: 58 BPM | DIASTOLIC BLOOD PRESSURE: 76 MMHG | OXYGEN SATURATION: 97 % | SYSTOLIC BLOOD PRESSURE: 135 MMHG | BODY MASS INDEX: 25.17 KG/M2 | WEIGHT: 196 LBS

## 2024-12-30 PROCEDURE — G2211 COMPLEX E/M VISIT ADD ON: CPT

## 2024-12-30 PROCEDURE — 99214 OFFICE O/P EST MOD 30 MIN: CPT

## 2024-12-30 PROCEDURE — 93000 ELECTROCARDIOGRAM COMPLETE: CPT

## 2025-01-09 ENCOUNTER — APPOINTMENT (OUTPATIENT)
Dept: PULMONOLOGY | Facility: CLINIC | Age: 75
End: 2025-01-09
Payer: SUBSIDIZED

## 2025-01-09 VITALS
TEMPERATURE: 98.3 F | HEIGHT: 74 IN | RESPIRATION RATE: 15 BRPM | DIASTOLIC BLOOD PRESSURE: 83 MMHG | SYSTOLIC BLOOD PRESSURE: 148 MMHG | BODY MASS INDEX: 25.15 KG/M2 | HEART RATE: 68 BPM | WEIGHT: 196 LBS | OXYGEN SATURATION: 94 %

## 2025-01-09 PROCEDURE — 99214 OFFICE O/P EST MOD 30 MIN: CPT

## 2025-01-27 ENCOUNTER — APPOINTMENT (OUTPATIENT)
Dept: NEUROLOGY | Facility: CLINIC | Age: 75
End: 2025-01-27
Payer: MEDICARE

## 2025-01-27 VITALS
WEIGHT: 201 LBS | DIASTOLIC BLOOD PRESSURE: 85 MMHG | HEIGHT: 74 IN | BODY MASS INDEX: 25.8 KG/M2 | HEART RATE: 51 BPM | SYSTOLIC BLOOD PRESSURE: 145 MMHG

## 2025-01-27 DIAGNOSIS — G45.9 TRANSIENT CEREBRAL ISCHEMIC ATTACK, UNSPECIFIED: ICD-10-CM

## 2025-01-27 DIAGNOSIS — R47.89 OTHER SPEECH DISTURBANCES: ICD-10-CM

## 2025-01-27 DIAGNOSIS — I67.9 CEREBROVASCULAR DISEASE, UNSPECIFIED: ICD-10-CM

## 2025-01-27 PROCEDURE — 99214 OFFICE O/P EST MOD 30 MIN: CPT

## 2025-01-27 PROCEDURE — 93886 INTRACRANIAL COMPLETE STUDY: CPT

## 2025-01-27 PROCEDURE — 93880 EXTRACRANIAL BILAT STUDY: CPT

## 2025-01-27 PROCEDURE — G2211 COMPLEX E/M VISIT ADD ON: CPT

## 2025-01-27 PROCEDURE — 93892 TCD EMBOLI DETECT W/O INJ: CPT

## 2025-02-17 ENCOUNTER — OUTPATIENT (OUTPATIENT)
Dept: OUTPATIENT SERVICES | Facility: HOSPITAL | Age: 75
LOS: 1 days | End: 2025-02-17
Payer: MEDICARE

## 2025-02-17 ENCOUNTER — APPOINTMENT (OUTPATIENT)
Dept: CT IMAGING | Facility: IMAGING CENTER | Age: 75
End: 2025-02-17
Payer: MEDICARE

## 2025-02-17 DIAGNOSIS — Z98.890 OTHER SPECIFIED POSTPROCEDURAL STATES: Chronic | ICD-10-CM

## 2025-02-17 DIAGNOSIS — J84.9 INTERSTITIAL PULMONARY DISEASE, UNSPECIFIED: ICD-10-CM

## 2025-02-17 PROCEDURE — 71250 CT THORAX DX C-: CPT

## 2025-02-17 PROCEDURE — 71250 CT THORAX DX C-: CPT | Mod: 26

## 2025-02-26 DIAGNOSIS — J84.9 INTERSTITIAL PULMONARY DISEASE, UNSPECIFIED: ICD-10-CM

## 2025-02-28 ENCOUNTER — NON-APPOINTMENT (OUTPATIENT)
Age: 75
End: 2025-02-28

## 2025-03-10 ENCOUNTER — NON-APPOINTMENT (OUTPATIENT)
Age: 75
End: 2025-03-10

## 2025-03-20 ENCOUNTER — NON-APPOINTMENT (OUTPATIENT)
Age: 75
End: 2025-03-20

## 2025-03-25 ENCOUNTER — NON-APPOINTMENT (OUTPATIENT)
Age: 75
End: 2025-03-25

## 2025-04-03 ENCOUNTER — APPOINTMENT (OUTPATIENT)
Dept: PULMONOLOGY | Facility: CLINIC | Age: 75
End: 2025-04-03

## 2025-04-03 ENCOUNTER — APPOINTMENT (OUTPATIENT)
Dept: PULMONOLOGY | Facility: CLINIC | Age: 75
End: 2025-04-03
Payer: MEDICARE

## 2025-04-03 VITALS
HEIGHT: 74 IN | HEART RATE: 70 BPM | SYSTOLIC BLOOD PRESSURE: 100 MMHG | OXYGEN SATURATION: 95 % | WEIGHT: 204 LBS | DIASTOLIC BLOOD PRESSURE: 65 MMHG | RESPIRATION RATE: 16 BRPM | BODY MASS INDEX: 26.18 KG/M2 | TEMPERATURE: 97 F

## 2025-04-03 DIAGNOSIS — R00.1 BRADYCARDIA, UNSPECIFIED: ICD-10-CM

## 2025-04-03 PROCEDURE — 99214 OFFICE O/P EST MOD 30 MIN: CPT | Mod: 25

## 2025-04-03 PROCEDURE — 94618 PULMONARY STRESS TESTING: CPT

## 2025-04-03 PROCEDURE — ZZZZZ: CPT

## 2025-04-04 ENCOUNTER — NON-APPOINTMENT (OUTPATIENT)
Age: 75
End: 2025-04-04

## 2025-04-04 LAB — TSH SERPL-ACNC: 0.58 UIU/ML

## 2025-04-11 ENCOUNTER — NON-APPOINTMENT (OUTPATIENT)
Age: 75
End: 2025-04-11

## 2025-04-18 ENCOUNTER — NON-APPOINTMENT (OUTPATIENT)
Age: 75
End: 2025-04-18

## 2025-04-25 ENCOUNTER — NON-APPOINTMENT (OUTPATIENT)
Age: 75
End: 2025-04-25

## 2025-05-06 ENCOUNTER — APPOINTMENT (OUTPATIENT)
Dept: PULMONOLOGY | Facility: CLINIC | Age: 75
End: 2025-05-06
Payer: SUBSIDIZED

## 2025-05-06 VITALS
DIASTOLIC BLOOD PRESSURE: 83 MMHG | HEART RATE: 62 BPM | BODY MASS INDEX: 25.67 KG/M2 | WEIGHT: 200 LBS | TEMPERATURE: 97.1 F | OXYGEN SATURATION: 97 % | SYSTOLIC BLOOD PRESSURE: 142 MMHG | RESPIRATION RATE: 16 BRPM | HEIGHT: 74 IN

## 2025-05-06 PROCEDURE — 99214 OFFICE O/P EST MOD 30 MIN: CPT

## 2025-05-07 DIAGNOSIS — R00.1 BRADYCARDIA, UNSPECIFIED: ICD-10-CM

## 2025-05-07 DIAGNOSIS — R05.9 COUGH, UNSPECIFIED: ICD-10-CM

## 2025-05-14 ENCOUNTER — NON-APPOINTMENT (OUTPATIENT)
Age: 75
End: 2025-05-14

## 2025-05-20 DIAGNOSIS — J84.9 INTERSTITIAL PULMONARY DISEASE, UNSPECIFIED: ICD-10-CM

## 2025-05-22 ENCOUNTER — NON-APPOINTMENT (OUTPATIENT)
Age: 75
End: 2025-05-22

## 2025-05-27 ENCOUNTER — NON-APPOINTMENT (OUTPATIENT)
Age: 75
End: 2025-05-27

## 2025-05-30 ENCOUNTER — APPOINTMENT (OUTPATIENT)
Dept: PULMONOLOGY | Facility: CLINIC | Age: 75
End: 2025-05-30
Payer: MEDICARE

## 2025-05-30 ENCOUNTER — NON-APPOINTMENT (OUTPATIENT)
Age: 75
End: 2025-05-30

## 2025-05-30 PROCEDURE — 94618 PULMONARY STRESS TESTING: CPT

## 2025-06-02 ENCOUNTER — APPOINTMENT (OUTPATIENT)
Dept: PULMONOLOGY | Facility: CLINIC | Age: 75
End: 2025-06-02
Payer: MEDICARE

## 2025-06-02 PROCEDURE — G0239: CPT

## 2025-06-04 ENCOUNTER — APPOINTMENT (OUTPATIENT)
Dept: PULMONOLOGY | Facility: CLINIC | Age: 75
End: 2025-06-04
Payer: MEDICARE

## 2025-06-04 PROCEDURE — G0239: CPT

## 2025-06-05 ENCOUNTER — NON-APPOINTMENT (OUTPATIENT)
Age: 75
End: 2025-06-05

## 2025-06-09 ENCOUNTER — APPOINTMENT (OUTPATIENT)
Dept: PULMONOLOGY | Facility: CLINIC | Age: 75
End: 2025-06-09
Payer: MEDICARE

## 2025-06-09 PROCEDURE — G0239: CPT

## 2025-06-11 ENCOUNTER — APPOINTMENT (OUTPATIENT)
Dept: PULMONOLOGY | Facility: CLINIC | Age: 75
End: 2025-06-11
Payer: MEDICARE

## 2025-06-11 PROCEDURE — G0239: CPT

## 2025-06-12 ENCOUNTER — APPOINTMENT (OUTPATIENT)
Dept: PULMONOLOGY | Facility: CLINIC | Age: 75
End: 2025-06-12
Payer: MEDICARE

## 2025-06-12 VITALS
RESPIRATION RATE: 16 BRPM | SYSTOLIC BLOOD PRESSURE: 157 MMHG | DIASTOLIC BLOOD PRESSURE: 79 MMHG | HEIGHT: 74 IN | HEART RATE: 69 BPM | OXYGEN SATURATION: 94 % | BODY MASS INDEX: 25.67 KG/M2 | WEIGHT: 200 LBS | TEMPERATURE: 97.1 F

## 2025-06-12 PROCEDURE — 99214 OFFICE O/P EST MOD 30 MIN: CPT

## 2025-06-16 ENCOUNTER — APPOINTMENT (OUTPATIENT)
Dept: RADIOLOGY | Facility: CLINIC | Age: 75
End: 2025-06-16

## 2025-06-16 ENCOUNTER — OUTPATIENT (OUTPATIENT)
Dept: OUTPATIENT SERVICES | Facility: HOSPITAL | Age: 75
LOS: 1 days | End: 2025-06-16
Payer: MEDICARE

## 2025-06-16 ENCOUNTER — APPOINTMENT (OUTPATIENT)
Dept: PULMONOLOGY | Facility: CLINIC | Age: 75
End: 2025-06-16
Payer: MEDICARE

## 2025-06-16 VITALS
BODY MASS INDEX: 25.93 KG/M2 | DIASTOLIC BLOOD PRESSURE: 77 MMHG | HEIGHT: 74 IN | RESPIRATION RATE: 17 BRPM | SYSTOLIC BLOOD PRESSURE: 132 MMHG | TEMPERATURE: 97.8 F | HEART RATE: 73 BPM | OXYGEN SATURATION: 96 % | WEIGHT: 202 LBS

## 2025-06-16 DIAGNOSIS — J84.9 INTERSTITIAL PULMONARY DISEASE, UNSPECIFIED: ICD-10-CM

## 2025-06-16 DIAGNOSIS — Z98.890 OTHER SPECIFIED POSTPROCEDURAL STATES: Chronic | ICD-10-CM

## 2025-06-16 PROCEDURE — 77085 DXA BONE DENSITY AXL VRT FX: CPT | Mod: 26

## 2025-06-16 PROCEDURE — 94010 BREATHING CAPACITY TEST: CPT

## 2025-06-16 PROCEDURE — 94726 PLETHYSMOGRAPHY LUNG VOLUMES: CPT

## 2025-06-16 PROCEDURE — 99215 OFFICE O/P EST HI 40 MIN: CPT | Mod: 25

## 2025-06-16 PROCEDURE — 94729 DIFFUSING CAPACITY: CPT

## 2025-06-16 PROCEDURE — G0239: CPT

## 2025-06-16 PROCEDURE — 77085 DXA BONE DENSITY AXL VRT FX: CPT

## 2025-06-16 PROCEDURE — ZZZZZ: CPT

## 2025-06-18 ENCOUNTER — APPOINTMENT (OUTPATIENT)
Dept: PULMONOLOGY | Facility: CLINIC | Age: 75
End: 2025-06-18
Payer: MEDICARE

## 2025-06-18 PROCEDURE — G0239: CPT

## 2025-06-20 PROBLEM — E87.5 HYPERKALEMIA: Status: ACTIVE | Noted: 2025-06-20

## 2025-06-23 ENCOUNTER — APPOINTMENT (OUTPATIENT)
Dept: PULMONOLOGY | Facility: CLINIC | Age: 75
End: 2025-06-23
Payer: MEDICARE

## 2025-06-23 PROCEDURE — G0239: CPT

## 2025-06-25 ENCOUNTER — APPOINTMENT (OUTPATIENT)
Dept: PULMONOLOGY | Facility: CLINIC | Age: 75
End: 2025-06-25
Payer: MEDICARE

## 2025-06-25 PROCEDURE — G0239: CPT

## 2025-06-30 ENCOUNTER — APPOINTMENT (OUTPATIENT)
Dept: PULMONOLOGY | Facility: CLINIC | Age: 75
End: 2025-06-30
Payer: MEDICARE

## 2025-06-30 PROCEDURE — G0239: CPT

## 2025-07-01 ENCOUNTER — NON-APPOINTMENT (OUTPATIENT)
Age: 75
End: 2025-07-01

## 2025-07-01 ENCOUNTER — APPOINTMENT (OUTPATIENT)
Dept: PULMONOLOGY | Facility: CLINIC | Age: 75
End: 2025-07-01
Payer: MEDICARE

## 2025-07-01 ENCOUNTER — APPOINTMENT (OUTPATIENT)
Facility: CLINIC | Age: 75
End: 2025-07-01
Payer: MEDICARE

## 2025-07-01 VITALS
WEIGHT: 200.14 LBS | HEART RATE: 93 BPM | DIASTOLIC BLOOD PRESSURE: 71 MMHG | RESPIRATION RATE: 17 BRPM | HEIGHT: 74 IN | SYSTOLIC BLOOD PRESSURE: 123 MMHG | TEMPERATURE: 97.9 F | BODY MASS INDEX: 25.68 KG/M2 | OXYGEN SATURATION: 97 %

## 2025-07-01 PROCEDURE — 99215 OFFICE O/P EST HI 40 MIN: CPT

## 2025-07-01 PROCEDURE — 99205 OFFICE O/P NEW HI 60 MIN: CPT

## 2025-07-02 ENCOUNTER — APPOINTMENT (OUTPATIENT)
Dept: PULMONOLOGY | Facility: CLINIC | Age: 75
End: 2025-07-02
Payer: MEDICARE

## 2025-07-02 LAB — ABORH: NORMAL

## 2025-07-02 PROCEDURE — G0239: CPT

## 2025-07-03 ENCOUNTER — APPOINTMENT (OUTPATIENT)
Dept: PULMONOLOGY | Facility: CLINIC | Age: 75
End: 2025-07-03

## 2025-07-07 ENCOUNTER — APPOINTMENT (OUTPATIENT)
Dept: PULMONOLOGY | Facility: CLINIC | Age: 75
End: 2025-07-07
Payer: MEDICARE

## 2025-07-07 PROCEDURE — G0239: CPT

## 2025-07-08 ENCOUNTER — NON-APPOINTMENT (OUTPATIENT)
Age: 75
End: 2025-07-08

## 2025-07-09 ENCOUNTER — APPOINTMENT (OUTPATIENT)
Dept: PULMONOLOGY | Facility: CLINIC | Age: 75
End: 2025-07-09
Payer: MEDICARE

## 2025-07-09 PROCEDURE — G0239: CPT

## 2025-07-14 ENCOUNTER — APPOINTMENT (OUTPATIENT)
Dept: PULMONOLOGY | Facility: CLINIC | Age: 75
End: 2025-07-14

## 2025-07-16 ENCOUNTER — APPOINTMENT (OUTPATIENT)
Dept: PULMONOLOGY | Facility: CLINIC | Age: 75
End: 2025-07-16

## 2025-07-21 ENCOUNTER — RX RENEWAL (OUTPATIENT)
Age: 75
End: 2025-07-21

## 2025-07-21 ENCOUNTER — APPOINTMENT (OUTPATIENT)
Dept: PULMONOLOGY | Facility: CLINIC | Age: 75
End: 2025-07-21

## 2025-07-23 ENCOUNTER — APPOINTMENT (OUTPATIENT)
Dept: PULMONOLOGY | Facility: CLINIC | Age: 75
End: 2025-07-23

## 2025-07-25 ENCOUNTER — NON-APPOINTMENT (OUTPATIENT)
Age: 75
End: 2025-07-25

## 2025-07-28 ENCOUNTER — APPOINTMENT (OUTPATIENT)
Dept: PULMONOLOGY | Facility: CLINIC | Age: 75
End: 2025-07-28
Payer: MEDICARE

## 2025-07-28 PROCEDURE — G0239: CPT

## 2025-07-30 ENCOUNTER — APPOINTMENT (OUTPATIENT)
Dept: PULMONOLOGY | Facility: CLINIC | Age: 75
End: 2025-07-30
Payer: MEDICARE

## 2025-07-30 PROCEDURE — G0239: CPT

## 2025-07-31 ENCOUNTER — NON-APPOINTMENT (OUTPATIENT)
Age: 75
End: 2025-07-31

## 2025-07-31 ENCOUNTER — RESULT REVIEW (OUTPATIENT)
Age: 75
End: 2025-07-31

## 2025-08-01 ENCOUNTER — NON-APPOINTMENT (OUTPATIENT)
Age: 75
End: 2025-08-01

## 2025-08-04 ENCOUNTER — APPOINTMENT (OUTPATIENT)
Dept: PULMONOLOGY | Facility: CLINIC | Age: 75
End: 2025-08-04
Payer: MEDICARE

## 2025-08-04 PROCEDURE — G0239: CPT

## 2025-08-06 ENCOUNTER — APPOINTMENT (OUTPATIENT)
Age: 75
End: 2025-08-06

## 2025-08-06 ENCOUNTER — OUTPATIENT (OUTPATIENT)
Dept: OUTPATIENT SERVICES | Facility: HOSPITAL | Age: 75
LOS: 1 days | End: 2025-08-06
Payer: MEDICARE

## 2025-08-06 ENCOUNTER — APPOINTMENT (OUTPATIENT)
Dept: PULMONOLOGY | Facility: CLINIC | Age: 75
End: 2025-08-06
Payer: MEDICARE

## 2025-08-06 DIAGNOSIS — Z98.890 OTHER SPECIFIED POSTPROCEDURAL STATES: Chronic | ICD-10-CM

## 2025-08-06 PROCEDURE — 93930 UPPER EXTREMITY STUDY: CPT

## 2025-08-06 PROCEDURE — 93925 LOWER EXTREMITY STUDY: CPT | Mod: 26

## 2025-08-06 PROCEDURE — G0239: CPT

## 2025-08-06 PROCEDURE — 93880 EXTRACRANIAL BILAT STUDY: CPT

## 2025-08-06 PROCEDURE — 93925 LOWER EXTREMITY STUDY: CPT

## 2025-08-06 PROCEDURE — 93930 UPPER EXTREMITY STUDY: CPT | Mod: 26

## 2025-08-06 PROCEDURE — 93880 EXTRACRANIAL BILAT STUDY: CPT | Mod: 26

## 2025-08-06 PROCEDURE — 93970 EXTREMITY STUDY: CPT

## 2025-08-06 PROCEDURE — 93970 EXTREMITY STUDY: CPT | Mod: 26

## 2025-08-06 PROCEDURE — 93970 EXTREMITY STUDY: CPT | Mod: 26,XU

## 2025-08-08 ENCOUNTER — APPOINTMENT (OUTPATIENT)
Age: 75
End: 2025-08-08
Payer: MEDICARE

## 2025-08-08 ENCOUNTER — OUTPATIENT (OUTPATIENT)
Dept: OUTPATIENT SERVICES | Facility: HOSPITAL | Age: 75
LOS: 1 days | End: 2025-08-08
Payer: MEDICARE

## 2025-08-08 DIAGNOSIS — Z98.890 OTHER SPECIFIED POSTPROCEDURAL STATES: Chronic | ICD-10-CM

## 2025-08-08 DIAGNOSIS — J84.9 INTERSTITIAL PULMONARY DISEASE, UNSPECIFIED: ICD-10-CM

## 2025-08-08 PROCEDURE — 74177 CT ABD & PELVIS W/CONTRAST: CPT | Mod: 26

## 2025-08-08 PROCEDURE — 71046 X-RAY EXAM CHEST 2 VIEWS: CPT | Mod: 26

## 2025-08-08 PROCEDURE — 71046 X-RAY EXAM CHEST 2 VIEWS: CPT

## 2025-08-08 PROCEDURE — 71260 CT THORAX DX C+: CPT | Mod: 26

## 2025-08-08 PROCEDURE — 70450 CT HEAD/BRAIN W/O DYE: CPT

## 2025-08-08 PROCEDURE — 74177 CT ABD & PELVIS W/CONTRAST: CPT

## 2025-08-08 PROCEDURE — 70450 CT HEAD/BRAIN W/O DYE: CPT | Mod: 26

## 2025-08-08 PROCEDURE — 71260 CT THORAX DX C+: CPT

## 2025-08-11 ENCOUNTER — APPOINTMENT (OUTPATIENT)
Dept: PULMONOLOGY | Facility: CLINIC | Age: 75
End: 2025-08-11

## 2025-08-11 ENCOUNTER — TRANSCRIPTION ENCOUNTER (OUTPATIENT)
Age: 75
End: 2025-08-11

## 2025-08-11 ENCOUNTER — OUTPATIENT (OUTPATIENT)
Dept: OUTPATIENT SERVICES | Facility: HOSPITAL | Age: 75
LOS: 1 days | End: 2025-08-11
Payer: MEDICARE

## 2025-08-11 VITALS
SYSTOLIC BLOOD PRESSURE: 136 MMHG | TEMPERATURE: 98 F | WEIGHT: 199.96 LBS | DIASTOLIC BLOOD PRESSURE: 76 MMHG | HEART RATE: 60 BPM | HEIGHT: 74 IN | RESPIRATION RATE: 16 BRPM | OXYGEN SATURATION: 97 %

## 2025-08-11 VITALS
DIASTOLIC BLOOD PRESSURE: 72 MMHG | HEART RATE: 59 BPM | OXYGEN SATURATION: 96 % | RESPIRATION RATE: 19 BRPM | SYSTOLIC BLOOD PRESSURE: 129 MMHG

## 2025-08-11 DIAGNOSIS — J84.9 INTERSTITIAL PULMONARY DISEASE, UNSPECIFIED: ICD-10-CM

## 2025-08-11 DIAGNOSIS — Z98.890 OTHER SPECIFIED POSTPROCEDURAL STATES: Chronic | ICD-10-CM

## 2025-08-11 LAB
ANION GAP SERPL CALC-SCNC: 13 MMOL/L — SIGNIFICANT CHANGE UP (ref 5–17)
BUN SERPL-MCNC: 16 MG/DL — SIGNIFICANT CHANGE UP (ref 7–23)
CALCIUM SERPL-MCNC: 9.4 MG/DL — SIGNIFICANT CHANGE UP (ref 8.4–10.5)
CHLORIDE SERPL-SCNC: 106 MMOL/L — SIGNIFICANT CHANGE UP (ref 96–108)
CO2 SERPL-SCNC: 25 MMOL/L — SIGNIFICANT CHANGE UP (ref 22–31)
CREAT SERPL-MCNC: 0.78 MG/DL — SIGNIFICANT CHANGE UP (ref 0.5–1.3)
EGFR: 94 ML/MIN/1.73M2 — SIGNIFICANT CHANGE UP
EGFR: 94 ML/MIN/1.73M2 — SIGNIFICANT CHANGE UP
GLUCOSE SERPL-MCNC: 87 MG/DL — SIGNIFICANT CHANGE UP (ref 70–99)
HCT VFR BLD CALC: 38.4 % — LOW (ref 39–50)
HGB BLD-MCNC: 12.3 G/DL — LOW (ref 13–17)
HGB FLD-MCNC: 12 G/DL — LOW (ref 12.6–17.4)
MCHC RBC-ENTMCNC: 31.5 PG — SIGNIFICANT CHANGE UP (ref 27–34)
MCHC RBC-ENTMCNC: 32 G/DL — SIGNIFICANT CHANGE UP (ref 32–36)
MCV RBC AUTO: 98.2 FL — SIGNIFICANT CHANGE UP (ref 80–100)
NRBC # BLD AUTO: 0 K/UL — SIGNIFICANT CHANGE UP (ref 0–0)
NRBC # FLD: 0 K/UL — SIGNIFICANT CHANGE UP (ref 0–0)
NRBC BLD AUTO-RTO: 0 /100 WBCS — SIGNIFICANT CHANGE UP (ref 0–0)
OXYHGB MFR BLDMV: 76.5 % — LOW (ref 90–95)
PLATELET # BLD AUTO: 201 K/UL — SIGNIFICANT CHANGE UP (ref 150–400)
PMV BLD: 9.5 FL — SIGNIFICANT CHANGE UP (ref 7–13)
POTASSIUM SERPL-MCNC: 4.2 MMOL/L — SIGNIFICANT CHANGE UP (ref 3.5–5.3)
POTASSIUM SERPL-SCNC: 4.2 MMOL/L — SIGNIFICANT CHANGE UP (ref 3.5–5.3)
RBC # BLD: 3.91 M/UL — LOW (ref 4.2–5.8)
RBC # FLD: 13.2 % — SIGNIFICANT CHANGE UP (ref 10.3–14.5)
SAO2 % BLD: 77.8 % — SIGNIFICANT CHANGE UP (ref 60–90)
SODIUM SERPL-SCNC: 144 MMOL/L — SIGNIFICANT CHANGE UP (ref 135–145)
WBC # BLD: 8.04 K/UL — SIGNIFICANT CHANGE UP (ref 3.8–10.5)
WBC # FLD AUTO: 8.04 K/UL — SIGNIFICANT CHANGE UP (ref 3.8–10.5)

## 2025-08-11 PROCEDURE — C1887: CPT

## 2025-08-11 PROCEDURE — 93010 ELECTROCARDIOGRAM REPORT: CPT

## 2025-08-11 PROCEDURE — 85027 COMPLETE CBC AUTOMATED: CPT

## 2025-08-11 PROCEDURE — 93460 R&L HRT ART/VENTRICLE ANGIO: CPT | Mod: 26,59

## 2025-08-11 PROCEDURE — 80048 BASIC METABOLIC PNL TOTAL CA: CPT

## 2025-08-11 PROCEDURE — 92928 PRQ TCAT PLMT NTRAC ST 1 LES: CPT | Mod: RC

## 2025-08-11 PROCEDURE — C9600: CPT | Mod: RC

## 2025-08-11 PROCEDURE — C1894: CPT

## 2025-08-11 PROCEDURE — 99152 MOD SED SAME PHYS/QHP 5/>YRS: CPT

## 2025-08-11 PROCEDURE — 93005 ELECTROCARDIOGRAM TRACING: CPT

## 2025-08-11 PROCEDURE — 82803 BLOOD GASES ANY COMBINATION: CPT

## 2025-08-11 PROCEDURE — C1769: CPT

## 2025-08-11 PROCEDURE — C1874: CPT

## 2025-08-11 PROCEDURE — 93460 R&L HRT ART/VENTRICLE ANGIO: CPT | Mod: 59

## 2025-08-11 RX ORDER — ASPIRIN 325 MG
1 TABLET ORAL
Refills: 0 | DISCHARGE

## 2025-08-11 RX ORDER — PREDNISONE 20 MG/1
3 TABLET ORAL
Refills: 0 | DISCHARGE

## 2025-08-11 RX ORDER — TERBINAFINE HYDROCHLORIDE 250 MG/1
1 TABLET ORAL
Refills: 0 | DISCHARGE

## 2025-08-11 RX ORDER — NINTEDANIB 100 MG/1
1 CAPSULE ORAL
Refills: 0 | DISCHARGE

## 2025-08-11 RX ORDER — ROSUVASTATIN CALCIUM 20 MG/1
1 TABLET, FILM COATED ORAL
Refills: 0 | DISCHARGE

## 2025-08-11 RX ORDER — LEVOTHYROXINE SODIUM 300 MCG
1 TABLET ORAL
Refills: 0 | DISCHARGE

## 2025-08-11 RX ORDER — CLOPIDOGREL BISULFATE 75 MG/1
1 TABLET, FILM COATED ORAL
Qty: 90 | Refills: 3
Start: 2025-08-11 | End: 2026-08-05

## 2025-08-11 RX ADMIN — Medication 50 MILLILITER(S): at 17:59

## 2025-08-12 PROBLEM — I10 ESSENTIAL (PRIMARY) HYPERTENSION: Chronic | Status: ACTIVE | Noted: 2025-08-11

## 2025-08-13 ENCOUNTER — APPOINTMENT (OUTPATIENT)
Dept: PULMONOLOGY | Facility: CLINIC | Age: 75
End: 2025-08-13

## 2025-08-15 LAB
ALBUMIN SERPL ELPH-MCNC: 4 G/DL
ALP BLD-CCNC: 68 U/L
ALT SERPL-CCNC: 12 U/L
ANION GAP SERPL CALC-SCNC: 11 MMOL/L
AST SERPL-CCNC: 21 U/L
BILIRUB SERPL-MCNC: 0.2 MG/DL
BUN SERPL-MCNC: 15 MG/DL
CALCIUM SERPL-MCNC: 9.3 MG/DL
CHLORIDE SERPL-SCNC: 105 MMOL/L
CO2 SERPL-SCNC: 26 MMOL/L
CREAT SERPL-MCNC: 0.73 MG/DL
EGFRCR SERPLBLD CKD-EPI 2021: 96 ML/MIN/1.73M2
GLUCOSE SERPL-MCNC: 111 MG/DL
POTASSIUM SERPL-SCNC: 4 MMOL/L
PROT SERPL-MCNC: 6.7 G/DL
SODIUM SERPL-SCNC: 142 MMOL/L

## 2025-08-18 ENCOUNTER — APPOINTMENT (OUTPATIENT)
Dept: PULMONOLOGY | Facility: CLINIC | Age: 75
End: 2025-08-18
Payer: MEDICARE

## 2025-08-18 ENCOUNTER — NON-APPOINTMENT (OUTPATIENT)
Age: 75
End: 2025-08-18

## 2025-08-18 ENCOUNTER — LABORATORY RESULT (OUTPATIENT)
Age: 75
End: 2025-08-18

## 2025-08-18 VITALS
RESPIRATION RATE: 17 BRPM | DIASTOLIC BLOOD PRESSURE: 74 MMHG | OXYGEN SATURATION: 98 % | SYSTOLIC BLOOD PRESSURE: 126 MMHG | TEMPERATURE: 97.6 F | WEIGHT: 197 LBS | HEIGHT: 74 IN | BODY MASS INDEX: 25.28 KG/M2 | HEART RATE: 73 BPM

## 2025-08-18 VITALS
HEART RATE: 84 BPM | BODY MASS INDEX: 25.54 KG/M2 | WEIGHT: 199 LBS | HEIGHT: 74 IN | DIASTOLIC BLOOD PRESSURE: 80 MMHG | SYSTOLIC BLOOD PRESSURE: 124 MMHG

## 2025-08-18 DIAGNOSIS — J84.9 INTERSTITIAL PULMONARY DISEASE, UNSPECIFIED: ICD-10-CM

## 2025-08-18 DIAGNOSIS — Z76.82 AWAITING ORGAN TRANSPLANT STATUS: ICD-10-CM

## 2025-08-18 PROCEDURE — 94618 PULMONARY STRESS TESTING: CPT

## 2025-08-18 PROCEDURE — 94010 BREATHING CAPACITY TEST: CPT

## 2025-08-18 PROCEDURE — 99215 OFFICE O/P EST HI 40 MIN: CPT | Mod: 25

## 2025-08-18 PROCEDURE — ZZZZZ: CPT

## 2025-08-18 PROCEDURE — 94729 DIFFUSING CAPACITY: CPT

## 2025-08-18 PROCEDURE — 94726 PLETHYSMOGRAPHY LUNG VOLUMES: CPT

## 2025-08-18 PROCEDURE — 36600 WITHDRAWAL OF ARTERIAL BLOOD: CPT | Mod: 59

## 2025-08-18 PROCEDURE — G0239: CPT

## 2025-08-18 PROCEDURE — 82803 BLOOD GASES ANY COMBINATION: CPT

## 2025-08-20 ENCOUNTER — LABORATORY RESULT (OUTPATIENT)
Age: 75
End: 2025-08-20

## 2025-08-20 ENCOUNTER — APPOINTMENT (OUTPATIENT)
Dept: PULMONOLOGY | Facility: CLINIC | Age: 75
End: 2025-08-20
Payer: MEDICARE

## 2025-08-20 PROCEDURE — G0239: CPT

## 2025-08-25 ENCOUNTER — APPOINTMENT (OUTPATIENT)
Dept: CT IMAGING | Facility: IMAGING CENTER | Age: 75
End: 2025-08-25

## 2025-08-25 ENCOUNTER — APPOINTMENT (OUTPATIENT)
Dept: PULMONOLOGY | Facility: CLINIC | Age: 75
End: 2025-08-25

## 2025-08-25 ENCOUNTER — OUTPATIENT (OUTPATIENT)
Dept: OUTPATIENT SERVICES | Facility: HOSPITAL | Age: 75
LOS: 1 days | End: 2025-08-25
Payer: MEDICARE

## 2025-08-25 DIAGNOSIS — Z98.890 OTHER SPECIFIED POSTPROCEDURAL STATES: Chronic | ICD-10-CM

## 2025-08-25 PROCEDURE — 71250 CT THORAX DX C-: CPT | Mod: 26

## 2025-08-25 PROCEDURE — 71250 CT THORAX DX C-: CPT

## 2025-08-26 ENCOUNTER — APPOINTMENT (OUTPATIENT)
Age: 75
End: 2025-08-26
Payer: MEDICARE

## 2025-08-26 ENCOUNTER — OUTPATIENT (OUTPATIENT)
Dept: OUTPATIENT SERVICES | Facility: HOSPITAL | Age: 75
LOS: 1 days | End: 2025-08-26
Payer: MEDICARE

## 2025-08-26 DIAGNOSIS — Z98.890 OTHER SPECIFIED POSTPROCEDURAL STATES: Chronic | ICD-10-CM

## 2025-08-26 DIAGNOSIS — J84.9 INTERSTITIAL PULMONARY DISEASE, UNSPECIFIED: ICD-10-CM

## 2025-08-26 PROCEDURE — 74261 CT COLONOGRAPHY DX: CPT | Mod: 26

## 2025-08-26 PROCEDURE — 74261 CT COLONOGRAPHY DX: CPT

## 2025-08-27 ENCOUNTER — APPOINTMENT (OUTPATIENT)
Dept: PULMONOLOGY | Facility: CLINIC | Age: 75
End: 2025-08-27

## 2025-08-27 ENCOUNTER — APPOINTMENT (OUTPATIENT)
Dept: CARDIOLOGY | Facility: CLINIC | Age: 75
End: 2025-08-27

## 2025-08-27 VITALS
HEIGHT: 74 IN | DIASTOLIC BLOOD PRESSURE: 73 MMHG | OXYGEN SATURATION: 96 % | HEART RATE: 60 BPM | SYSTOLIC BLOOD PRESSURE: 126 MMHG | WEIGHT: 200 LBS | BODY MASS INDEX: 25.67 KG/M2

## 2025-08-27 RX ORDER — CLOPIDOGREL 75 MG/1
75 TABLET, FILM COATED ORAL
Qty: 90 | Refills: 3 | Status: ACTIVE | COMMUNITY

## 2025-08-29 ENCOUNTER — NON-APPOINTMENT (OUTPATIENT)
Age: 75
End: 2025-08-29

## 2025-09-03 ENCOUNTER — APPOINTMENT (OUTPATIENT)
Dept: PULMONOLOGY | Facility: CLINIC | Age: 75
End: 2025-09-03
Payer: MEDICARE

## 2025-09-03 PROCEDURE — G0239: CPT

## 2025-09-08 ENCOUNTER — APPOINTMENT (OUTPATIENT)
Dept: PULMONOLOGY | Facility: CLINIC | Age: 75
End: 2025-09-08
Payer: MEDICARE

## 2025-09-08 PROCEDURE — G0239: CPT

## 2025-09-10 ENCOUNTER — APPOINTMENT (OUTPATIENT)
Dept: PULMONOLOGY | Facility: CLINIC | Age: 75
End: 2025-09-10
Payer: MEDICARE

## 2025-09-10 PROCEDURE — G0239: CPT

## 2025-09-11 ENCOUNTER — RESULT REVIEW (OUTPATIENT)
Age: 75
End: 2025-09-11

## 2025-09-11 ENCOUNTER — APPOINTMENT (OUTPATIENT)
Dept: CV DIAGNOSITCS | Facility: HOSPITAL | Age: 75
End: 2025-09-11

## 2025-09-15 ENCOUNTER — APPOINTMENT (OUTPATIENT)
Dept: PULMONOLOGY | Facility: CLINIC | Age: 75
End: 2025-09-15
Payer: MEDICARE

## 2025-09-15 PROCEDURE — G0239: CPT

## 2025-09-16 ENCOUNTER — RESULT REVIEW (OUTPATIENT)
Age: 75
End: 2025-09-16

## 2025-09-16 ENCOUNTER — APPOINTMENT (OUTPATIENT)
Dept: RADIOLOGY | Facility: HOSPITAL | Age: 75
End: 2025-09-16
Payer: MEDICARE

## 2025-09-16 ENCOUNTER — APPOINTMENT (OUTPATIENT)
Dept: ULTRASOUND IMAGING | Facility: HOSPITAL | Age: 75
End: 2025-09-16
Payer: MEDICARE

## 2025-09-16 ENCOUNTER — APPOINTMENT (OUTPATIENT)
Dept: NUCLEAR MEDICINE | Facility: HOSPITAL | Age: 75
End: 2025-09-16
Payer: MEDICARE

## 2025-09-16 PROCEDURE — 78598 LUNG PERF&VENTILAT DIFERENTL: CPT | Mod: 26

## 2025-09-17 ENCOUNTER — APPOINTMENT (OUTPATIENT)
Dept: PULMONOLOGY | Facility: CLINIC | Age: 75
End: 2025-09-17
Payer: MEDICARE

## 2025-09-17 PROCEDURE — G0239: CPT

## (undated) DEVICE — GLV 7 PROTEXIS (WHITE)

## (undated) DEVICE — BRUSH CYTO DISP

## (undated) DEVICE — SOL INJ NS 0.9% 100ML

## (undated) DEVICE — VALVE SUCTION EVIS 160/200/240

## (undated) DEVICE — BALLOON SINGLE FOR BF-UC160F

## (undated) DEVICE — STOPCOCK 4-WAY (BLUE) DISCOFIX SPIN-LOCK CONNECTOR

## (undated) DEVICE — DRSG TAPE TRANSPORE 1"

## (undated) DEVICE — NDL ASPIRATION VIZISHOT2 22G

## (undated) DEVICE — SYR LUER SLIP TIP 30CC

## (undated) DEVICE — SYR LUER LOK 10CC

## (undated) DEVICE — MASK SURGICAL WITH EYESHIELD ANTIFOG (ORANGE)

## (undated) DEVICE — FORCEP BIOPSY OVAL CUP DISP

## (undated) DEVICE — DRSG CURITY GAUZE SPONGE 4 X 4" 12-PLY

## (undated) DEVICE — VALVE BIOPSY BRONCHOVIDEOSCOPE

## (undated) DEVICE — WARMING BLANKET LOWER ADULT

## (undated) DEVICE — SOL IRR POUR NS 0.9% 500ML

## (undated) DEVICE — FORCEP BIOPSY BRONCHOSCOPE DISP

## (undated) DEVICE — TRAP SPECIMEN SPUTUM 40CC

## (undated) DEVICE — ADAPTER FIBEROPTIC BRONCHOSCOPE DUAL AXIS SWIVEL

## (undated) DEVICE — SYR LUER LOK 20CC

## (undated) DEVICE — SUTURE REMOVAL KIT

## (undated) DEVICE — DRAPE TOWEL BLUE 17" X 24"

## (undated) DEVICE — PACK BRONCHOSCOPY